# Patient Record
Sex: FEMALE | Race: ASIAN | NOT HISPANIC OR LATINO | URBAN - METROPOLITAN AREA
[De-identification: names, ages, dates, MRNs, and addresses within clinical notes are randomized per-mention and may not be internally consistent; named-entity substitution may affect disease eponyms.]

---

## 2017-06-07 ENCOUNTER — EMERGENCY (EMERGENCY)
Facility: HOSPITAL | Age: 47
LOS: 0 days | Discharge: ANOTHER HOSPITAL | End: 2017-06-07
Attending: EMERGENCY MEDICINE
Payer: MEDICAID

## 2017-06-07 ENCOUNTER — INPATIENT (INPATIENT)
Facility: HOSPITAL | Age: 47
LOS: 12 days | Discharge: HOME | End: 2017-06-20
Attending: PSYCHIATRY & NEUROLOGY | Admitting: PSYCHIATRY & NEUROLOGY
Payer: COMMERCIAL

## 2017-06-07 VITALS
HEART RATE: 102 BPM | HEIGHT: 63 IN | OXYGEN SATURATION: 99 % | RESPIRATION RATE: 18 BRPM | WEIGHT: 106.04 LBS | SYSTOLIC BLOOD PRESSURE: 145 MMHG | DIASTOLIC BLOOD PRESSURE: 62 MMHG | TEMPERATURE: 99 F

## 2017-06-07 VITALS
RESPIRATION RATE: 20 BRPM | TEMPERATURE: 98 F | OXYGEN SATURATION: 98 % | DIASTOLIC BLOOD PRESSURE: 91 MMHG | HEART RATE: 88 BPM | SYSTOLIC BLOOD PRESSURE: 138 MMHG

## 2017-06-07 VITALS — WEIGHT: 105.16 LBS | TEMPERATURE: 97 F | HEIGHT: 62 IN

## 2017-06-07 DIAGNOSIS — Z91.19 PATIENT'S NONCOMPLIANCE WITH OTHER MEDICAL TREATMENT AND REGIMEN: ICD-10-CM

## 2017-06-07 DIAGNOSIS — R69 ILLNESS, UNSPECIFIED: ICD-10-CM

## 2017-06-07 DIAGNOSIS — F31.9 BIPOLAR DISORDER, UNSPECIFIED: ICD-10-CM

## 2017-06-07 DIAGNOSIS — F25.0 SCHIZOAFFECTIVE DISORDER, BIPOLAR TYPE: ICD-10-CM

## 2017-06-07 LAB
ALBUMIN SERPL ELPH-MCNC: 4.2 G/DL — SIGNIFICANT CHANGE UP (ref 3.3–5)
ALP SERPL-CCNC: 57 U/L — SIGNIFICANT CHANGE UP (ref 40–120)
ALT FLD-CCNC: 25 U/L — SIGNIFICANT CHANGE UP (ref 12–78)
AMPHET UR-MCNC: NEGATIVE — SIGNIFICANT CHANGE UP
ANION GAP SERPL CALC-SCNC: 8 MMOL/L — SIGNIFICANT CHANGE UP (ref 5–17)
APAP SERPL-MCNC: <2 UG/ML — LOW (ref 10–30)
APPEARANCE UR: CLEAR — SIGNIFICANT CHANGE UP
AST SERPL-CCNC: 29 U/L — SIGNIFICANT CHANGE UP (ref 15–37)
BARBITURATES UR SCN-MCNC: NEGATIVE — SIGNIFICANT CHANGE UP
BASOPHILS # BLD AUTO: 0.1 K/UL — SIGNIFICANT CHANGE UP (ref 0–0.2)
BASOPHILS NFR BLD AUTO: 1.6 % — SIGNIFICANT CHANGE UP (ref 0–2)
BENZODIAZ UR-MCNC: NEGATIVE — SIGNIFICANT CHANGE UP
BILIRUB SERPL-MCNC: 0.3 MG/DL — SIGNIFICANT CHANGE UP (ref 0.2–1.2)
BILIRUB UR-MCNC: NEGATIVE — SIGNIFICANT CHANGE UP
BUN SERPL-MCNC: 12 MG/DL — SIGNIFICANT CHANGE UP (ref 7–23)
CALCIUM SERPL-MCNC: 9.1 MG/DL — SIGNIFICANT CHANGE UP (ref 8.5–10.1)
CHLORIDE SERPL-SCNC: 103 MMOL/L — SIGNIFICANT CHANGE UP (ref 96–108)
CO2 SERPL-SCNC: 28 MMOL/L — SIGNIFICANT CHANGE UP (ref 22–31)
COCAINE METAB.OTHER UR-MCNC: NEGATIVE — SIGNIFICANT CHANGE UP
COLOR SPEC: YELLOW — SIGNIFICANT CHANGE UP
CREAT SERPL-MCNC: 0.86 MG/DL — SIGNIFICANT CHANGE UP (ref 0.5–1.3)
DIFF PNL FLD: NEGATIVE — SIGNIFICANT CHANGE UP
EOSINOPHIL # BLD AUTO: 0.1 K/UL — SIGNIFICANT CHANGE UP (ref 0–0.5)
EOSINOPHIL NFR BLD AUTO: 1.4 % — SIGNIFICANT CHANGE UP (ref 0–6)
ETHANOL SERPL-MCNC: <10 MG/DL — SIGNIFICANT CHANGE UP (ref 0–10)
GLUCOSE SERPL-MCNC: 115 MG/DL — HIGH (ref 70–99)
GLUCOSE UR QL: NEGATIVE MG/DL — SIGNIFICANT CHANGE UP
HCG UR QL: NEGATIVE — SIGNIFICANT CHANGE UP
HCT VFR BLD CALC: 34.5 % — SIGNIFICANT CHANGE UP (ref 34.5–45)
HGB BLD-MCNC: 12.4 G/DL — SIGNIFICANT CHANGE UP (ref 11.5–15.5)
KETONES UR-MCNC: ABNORMAL
LEUKOCYTE ESTERASE UR-ACNC: NEGATIVE — SIGNIFICANT CHANGE UP
LYMPHOCYTES # BLD AUTO: 2.8 K/UL — SIGNIFICANT CHANGE UP (ref 1–3.3)
LYMPHOCYTES # BLD AUTO: 30.6 % — SIGNIFICANT CHANGE UP (ref 13–44)
MCHC RBC-ENTMCNC: 31.7 PG — SIGNIFICANT CHANGE UP (ref 27–34)
MCHC RBC-ENTMCNC: 36 GM/DL — SIGNIFICANT CHANGE UP (ref 32–36)
MCV RBC AUTO: 88.1 FL — SIGNIFICANT CHANGE UP (ref 80–100)
METHADONE UR-MCNC: NEGATIVE — SIGNIFICANT CHANGE UP
MONOCYTES # BLD AUTO: 0.7 K/UL — SIGNIFICANT CHANGE UP (ref 0–0.9)
MONOCYTES NFR BLD AUTO: 7.9 % — SIGNIFICANT CHANGE UP (ref 2–14)
NEUTROPHILS # BLD AUTO: 5.3 K/UL — SIGNIFICANT CHANGE UP (ref 1.8–7.4)
NEUTROPHILS NFR BLD AUTO: 58.5 % — SIGNIFICANT CHANGE UP (ref 43–77)
NITRITE UR-MCNC: NEGATIVE — SIGNIFICANT CHANGE UP
OPIATES UR-MCNC: NEGATIVE — SIGNIFICANT CHANGE UP
PCP SPEC-MCNC: SIGNIFICANT CHANGE UP
PCP UR-MCNC: NEGATIVE — SIGNIFICANT CHANGE UP
PH UR: 6 — SIGNIFICANT CHANGE UP (ref 5–8)
PLATELET # BLD AUTO: 321 K/UL — SIGNIFICANT CHANGE UP (ref 150–400)
POTASSIUM SERPL-MCNC: 3.6 MMOL/L — SIGNIFICANT CHANGE UP (ref 3.5–5.3)
POTASSIUM SERPL-SCNC: 3.6 MMOL/L — SIGNIFICANT CHANGE UP (ref 3.5–5.3)
PROT SERPL-MCNC: 8.2 GM/DL — SIGNIFICANT CHANGE UP (ref 6–8.3)
PROT UR-MCNC: NEGATIVE MG/DL — SIGNIFICANT CHANGE UP
RBC # BLD: 3.92 M/UL — SIGNIFICANT CHANGE UP (ref 3.8–5.2)
RBC # FLD: 12.1 % — SIGNIFICANT CHANGE UP (ref 11–15)
SALICYLATES SERPL-MCNC: 4.6 MG/DL — SIGNIFICANT CHANGE UP (ref 2.8–20)
SODIUM SERPL-SCNC: 139 MMOL/L — SIGNIFICANT CHANGE UP (ref 135–145)
SP GR SPEC: 1.01 — SIGNIFICANT CHANGE UP (ref 1.01–1.02)
THC UR QL: POSITIVE — SIGNIFICANT CHANGE UP
TSH SERPL-MCNC: 1.05 UU/ML — SIGNIFICANT CHANGE UP (ref 0.36–3.74)
UROBILINOGEN FLD QL: NEGATIVE MG/DL — SIGNIFICANT CHANGE UP
VALPROATE SERPL-MCNC: <3 UG/ML — LOW (ref 50–100)
WBC # BLD: 9.1 K/UL — SIGNIFICANT CHANGE UP (ref 3.8–10.5)
WBC # FLD AUTO: 9.1 K/UL — SIGNIFICANT CHANGE UP (ref 3.8–10.5)

## 2017-06-07 PROCEDURE — 99223 1ST HOSP IP/OBS HIGH 75: CPT

## 2017-06-07 PROCEDURE — 90792 PSYCH DIAG EVAL W/MED SRVCS: CPT

## 2017-06-07 PROCEDURE — 99284 EMERGENCY DEPT VISIT MOD MDM: CPT

## 2017-06-07 RX ORDER — OLANZAPINE 15 MG/1
10 TABLET, FILM COATED ORAL AT BEDTIME
Qty: 0 | Refills: 0 | Status: DISCONTINUED | OUTPATIENT
Start: 2017-06-07 | End: 2017-06-08

## 2017-06-07 RX ORDER — DIPHENHYDRAMINE HCL 50 MG
50 CAPSULE ORAL ONCE
Qty: 0 | Refills: 0 | Status: DISCONTINUED | OUTPATIENT
Start: 2017-06-07 | End: 2017-06-20

## 2017-06-07 RX ORDER — HALOPERIDOL DECANOATE 100 MG/ML
5 INJECTION INTRAMUSCULAR ONCE
Qty: 0 | Refills: 0 | Status: COMPLETED | OUTPATIENT
Start: 2017-06-07 | End: 2017-06-07

## 2017-06-07 RX ORDER — HALOPERIDOL DECANOATE 100 MG/ML
5 INJECTION INTRAMUSCULAR ONCE
Qty: 0 | Refills: 0 | Status: DISCONTINUED | OUTPATIENT
Start: 2017-06-07 | End: 2017-06-20

## 2017-06-07 RX ORDER — HALOPERIDOL DECANOATE 100 MG/ML
5 INJECTION INTRAMUSCULAR EVERY 6 HOURS
Qty: 0 | Refills: 0 | Status: DISCONTINUED | OUTPATIENT
Start: 2017-06-07 | End: 2017-06-20

## 2017-06-07 RX ORDER — DIPHENHYDRAMINE HCL 50 MG
50 CAPSULE ORAL EVERY 6 HOURS
Qty: 0 | Refills: 0 | Status: DISCONTINUED | OUTPATIENT
Start: 2017-06-07 | End: 2017-06-20

## 2017-06-07 RX ADMIN — HALOPERIDOL DECANOATE 5 MILLIGRAM(S): 100 INJECTION INTRAMUSCULAR at 15:15

## 2017-06-07 RX ADMIN — Medication 2 MILLIGRAM(S): at 15:15

## 2017-06-07 NOTE — ED PROVIDER NOTE - OBJECTIVE STATEMENT
Pt. present BIBA for psych evaluation. Pt. has hx of Bipolar and follow up with Dr. Nieto-@ [facility]. EMS states that mom informed them that the patient has not been taking her medications(Zyprexa/Depakote). Pt. also was in her car "talking very fast" today. Pt. in the ED has no complaint. Pt. states that she is compliant with her meds. Pt. denies any SI/HI. Pt. also denies any Auditory/visual hallucinations.

## 2017-06-07 NOTE — ED BEHAVIORAL HEALTH ASSESSMENT NOTE - DETAILS
will signout when bed is available Dr Parra Low 3. Signout given to Kimberlyn ALVAREZ ([phone])

## 2017-06-07 NOTE — ED ADULT TRIAGE NOTE - CHIEF COMPLAINT QUOTE
biba for psych eval hx bipolar pts mother called 911 after pt found in car behaving erractically mother states non compliant with psych meds noted with skin infection to chin

## 2017-06-07 NOTE — ED BEHAVIORAL HEALTH ASSESSMENT NOTE - OTHER PAST PSYCHIATRIC HISTORY (INCLUDE DETAILS REGARDING ONSET, COURSE OF ILLNESS, INPATIENT/OUTPATIENT TREATMENT)
Patient has an extensive psychiatric history significant for Schizoaffective disorder- bipolar type vs Bipolar 1 disorder- cj, multiple prior inpatient psychiatric admissions, as per psychees last admission in 2015, no documented SI or HI, Cannabis abuse

## 2017-06-07 NOTE — ED BEHAVIORAL HEALTH ASSESSMENT NOTE - HPI (INCLUDE ILLNESS QUALITY, SEVERITY, DURATION, TIMING, CONTEXT, MODIFYING FACTORS, ASSOCIATED SIGNS AND SYMPTOMS)
Briefly, the patient is a 46 year old woman, with unavailable social history, likely lives with mother, has no pertinent medical issues, has an extensive psychiatric history significant for Schizoaffective disorder- bipolar type vs Bipolar 1 disorder- cj, multiple prior inpatient psychiatric admissions, as per psychees last admission in 2015, no documented SI or HI, Cannabis abuse, as per records in treatment with outpatient psychiatrist Dr Nieto, presented via EMS activated by mother for worsening symptoms of cj and aggression. Noncompliant with psychiatric medications.  Met with the patient. Loud, agitated, thought disorganization, pressured speech, verbally abusive, and nearly physically aggressive- threatening towards MD. Received Haldol 5mg + Ativan 2mg IM. Continues to pace, agitated, and unable to interview the patient. Va level <3. No collateral information available from mother.

## 2017-06-07 NOTE — ED ADULT NURSE NOTE - OBJECTIVE STATEMENT
pt pacing in room and very aggressive and loud, pt refusing to speak with ME, Pt request MD Parra to draw labs , pt placed on constant observation for risk of elopement, pt has been non compliant with medications.

## 2017-06-07 NOTE — ED BEHAVIORAL HEALTH ASSESSMENT NOTE - DESCRIPTION
Loud, agitated none documented the patient is a 46 year old woman, with unavailable social history, likely lives with mother, is responsible for a child?

## 2017-06-07 NOTE — ED BEHAVIORAL HEALTH ASSESSMENT NOTE - SUMMARY
Briefly, the patient is a 46 year old woman, with unavailable social history, likely lives with mother, has no pertinent medical issues, has an extensive psychiatric history significant for Schizoaffective disorder- bipolar type vs Bipolar 1 disorder- cj, multiple prior inpatient psychiatric admissions, as per psychees last admission in 2015, no documented SI or HI, Cannabis abuse, as per records in treatment with outpatient psychiatrist Dr Nieto, presented via EMS activated by mother for worsening symptoms of cj and aggression. Noncompliant with psychiatric medications.  Patient is noted to be exhibiting clear manic-psychotic symptoms- affect lability, hostility, pressured speech, inappropriate behaviors, thought disorganization, and paranoia, possible delusions. She is easily agitated, threatening, and nearly aggressive towards MD in ED. Noncompliant with medications.   At this point, based on evaluation, patient meets criteria for an involuntary inpatient psychiatric admission for symptom stabilization, medication management, and ensuring safety.

## 2017-06-08 DIAGNOSIS — F31.12 BIPOLAR DISORDER, CURRENT EPISODE MANIC WITHOUT PSYCHOTIC FEATURES, MODERATE: ICD-10-CM

## 2017-06-08 DIAGNOSIS — R46.1 BIZARRE PERSONAL APPEARANCE: ICD-10-CM

## 2017-06-08 PROCEDURE — 99232 SBSQ HOSP IP/OBS MODERATE 35: CPT

## 2017-06-08 RX ORDER — NICOTINE POLACRILEX 2 MG
1 GUM BUCCAL DAILY
Qty: 0 | Refills: 0 | Status: DISCONTINUED | OUTPATIENT
Start: 2017-06-08 | End: 2017-06-20

## 2017-06-08 RX ORDER — DIVALPROEX SODIUM 500 MG/1
500 TABLET, DELAYED RELEASE ORAL
Qty: 0 | Refills: 0 | Status: DISCONTINUED | OUTPATIENT
Start: 2017-06-08 | End: 2017-06-08

## 2017-06-08 RX ORDER — DIVALPROEX SODIUM 500 MG/1
500 TABLET, DELAYED RELEASE ORAL AT BEDTIME
Qty: 0 | Refills: 0 | Status: DISCONTINUED | OUTPATIENT
Start: 2017-06-08 | End: 2017-06-11

## 2017-06-08 RX ORDER — OLANZAPINE 15 MG/1
10 TABLET, FILM COATED ORAL
Qty: 0 | Refills: 0 | Status: DISCONTINUED | OUTPATIENT
Start: 2017-06-08 | End: 2017-06-20

## 2017-06-08 RX ADMIN — HALOPERIDOL DECANOATE 5 MILLIGRAM(S): 100 INJECTION INTRAMUSCULAR at 22:11

## 2017-06-08 RX ADMIN — Medication 50 MILLIGRAM(S): at 22:11

## 2017-06-08 RX ADMIN — Medication 2 MILLIGRAM(S): at 11:00

## 2017-06-08 RX ADMIN — Medication 1 PATCH: at 13:17

## 2017-06-08 RX ADMIN — OLANZAPINE 10 MILLIGRAM(S): 15 TABLET, FILM COATED ORAL at 11:03

## 2017-06-08 RX ADMIN — DIVALPROEX SODIUM 500 MILLIGRAM(S): 500 TABLET, DELAYED RELEASE ORAL at 22:11

## 2017-06-09 LAB
ALBUMIN SERPL ELPH-MCNC: 4.1 G/DL — SIGNIFICANT CHANGE UP (ref 3.3–5)
ALP SERPL-CCNC: 42 U/L — SIGNIFICANT CHANGE UP (ref 40–120)
ALT FLD-CCNC: 14 U/L — SIGNIFICANT CHANGE UP (ref 4–33)
AST SERPL-CCNC: 26 U/L — SIGNIFICANT CHANGE UP (ref 4–32)
BASOPHILS # BLD AUTO: 0.02 K/UL — SIGNIFICANT CHANGE UP (ref 0–0.2)
BASOPHILS NFR BLD AUTO: 0.3 % — SIGNIFICANT CHANGE UP (ref 0–2)
BILIRUB SERPL-MCNC: 0.2 MG/DL — SIGNIFICANT CHANGE UP (ref 0.2–1.2)
BUN SERPL-MCNC: 7 MG/DL — SIGNIFICANT CHANGE UP (ref 7–23)
CALCIUM SERPL-MCNC: 8.9 MG/DL — SIGNIFICANT CHANGE UP (ref 8.4–10.5)
CHLORIDE SERPL-SCNC: 100 MMOL/L — SIGNIFICANT CHANGE UP (ref 98–107)
CO2 SERPL-SCNC: 26 MMOL/L — SIGNIFICANT CHANGE UP (ref 22–31)
CREAT SERPL-MCNC: 0.75 MG/DL — SIGNIFICANT CHANGE UP (ref 0.5–1.3)
EOSINOPHIL # BLD AUTO: 0.07 K/UL — SIGNIFICANT CHANGE UP (ref 0–0.5)
EOSINOPHIL NFR BLD AUTO: 1.1 % — SIGNIFICANT CHANGE UP (ref 0–6)
GLUCOSE SERPL-MCNC: 82 MG/DL — SIGNIFICANT CHANGE UP (ref 70–99)
HCG SERPL-ACNC: < 5 MIU/ML — SIGNIFICANT CHANGE UP
HCT VFR BLD CALC: 37.2 % — SIGNIFICANT CHANGE UP (ref 34.5–45)
HGB BLD-MCNC: 11.9 G/DL — SIGNIFICANT CHANGE UP (ref 11.5–15.5)
IMM GRANULOCYTES NFR BLD AUTO: 0.2 % — SIGNIFICANT CHANGE UP (ref 0–1.5)
LYMPHOCYTES # BLD AUTO: 2.15 K/UL — SIGNIFICANT CHANGE UP (ref 1–3.3)
LYMPHOCYTES # BLD AUTO: 32.7 % — SIGNIFICANT CHANGE UP (ref 13–44)
MCHC RBC-ENTMCNC: 30.5 PG — SIGNIFICANT CHANGE UP (ref 27–34)
MCHC RBC-ENTMCNC: 32 % — SIGNIFICANT CHANGE UP (ref 32–36)
MCV RBC AUTO: 95.4 FL — SIGNIFICANT CHANGE UP (ref 80–100)
MONOCYTES # BLD AUTO: 0.3 K/UL — SIGNIFICANT CHANGE UP (ref 0–0.9)
MONOCYTES NFR BLD AUTO: 4.6 % — SIGNIFICANT CHANGE UP (ref 2–14)
NEUTROPHILS # BLD AUTO: 4.02 K/UL — SIGNIFICANT CHANGE UP (ref 1.8–7.4)
NEUTROPHILS NFR BLD AUTO: 61.1 % — SIGNIFICANT CHANGE UP (ref 43–77)
PLATELET # BLD AUTO: 317 K/UL — SIGNIFICANT CHANGE UP (ref 150–400)
PMV BLD: 11 FL — SIGNIFICANT CHANGE UP (ref 7–13)
POTASSIUM SERPL-MCNC: 3.9 MMOL/L — SIGNIFICANT CHANGE UP (ref 3.5–5.3)
POTASSIUM SERPL-SCNC: 3.9 MMOL/L — SIGNIFICANT CHANGE UP (ref 3.5–5.3)
PROT SERPL-MCNC: 6.7 G/DL — SIGNIFICANT CHANGE UP (ref 6–8.3)
RBC # BLD: 3.9 M/UL — SIGNIFICANT CHANGE UP (ref 3.8–5.2)
RBC # FLD: 13.8 % — SIGNIFICANT CHANGE UP (ref 10.3–14.5)
SODIUM SERPL-SCNC: 139 MMOL/L — SIGNIFICANT CHANGE UP (ref 135–145)
TSH SERPL-MCNC: 0.96 UIU/ML — SIGNIFICANT CHANGE UP (ref 0.27–4.2)
WBC # BLD: 6.57 K/UL — SIGNIFICANT CHANGE UP (ref 3.8–10.5)
WBC # FLD AUTO: 6.57 K/UL — SIGNIFICANT CHANGE UP (ref 3.8–10.5)

## 2017-06-09 PROCEDURE — 99232 SBSQ HOSP IP/OBS MODERATE 35: CPT

## 2017-06-09 RX ORDER — NICOTINE POLACRILEX 2 MG
4 GUM BUCCAL
Qty: 0 | Refills: 0 | Status: DISCONTINUED | OUTPATIENT
Start: 2017-06-09 | End: 2017-06-20

## 2017-06-09 RX ADMIN — OLANZAPINE 10 MILLIGRAM(S): 15 TABLET, FILM COATED ORAL at 21:11

## 2017-06-09 RX ADMIN — Medication 1 PATCH: at 12:56

## 2017-06-09 RX ADMIN — Medication 4 MILLIGRAM(S): at 02:40

## 2017-06-09 RX ADMIN — OLANZAPINE 10 MILLIGRAM(S): 15 TABLET, FILM COATED ORAL at 09:12

## 2017-06-09 RX ADMIN — Medication 2 MILLIGRAM(S): at 02:30

## 2017-06-09 RX ADMIN — DIVALPROEX SODIUM 500 MILLIGRAM(S): 500 TABLET, DELAYED RELEASE ORAL at 21:11

## 2017-06-09 RX ADMIN — Medication 4 MILLIGRAM(S): at 09:12

## 2017-06-09 RX ADMIN — Medication 4 MILLIGRAM(S): at 17:53

## 2017-06-09 RX ADMIN — Medication 4 MILLIGRAM(S): at 04:40

## 2017-06-10 PROCEDURE — 99232 SBSQ HOSP IP/OBS MODERATE 35: CPT

## 2017-06-10 RX ORDER — ACETAMINOPHEN 500 MG
650 TABLET ORAL EVERY 6 HOURS
Qty: 0 | Refills: 0 | Status: DISCONTINUED | OUTPATIENT
Start: 2017-06-10 | End: 2017-06-20

## 2017-06-10 RX ADMIN — Medication 4 MILLIGRAM(S): at 08:41

## 2017-06-10 RX ADMIN — Medication 650 MILLIGRAM(S): at 21:23

## 2017-06-10 RX ADMIN — Medication 1 PATCH: at 08:40

## 2017-06-10 RX ADMIN — Medication 4 MILLIGRAM(S): at 19:03

## 2017-06-10 RX ADMIN — Medication 4 MILLIGRAM(S): at 17:57

## 2017-06-10 RX ADMIN — Medication 4 MILLIGRAM(S): at 11:51

## 2017-06-10 RX ADMIN — Medication 4 MILLIGRAM(S): at 14:50

## 2017-06-10 RX ADMIN — Medication 650 MILLIGRAM(S): at 22:52

## 2017-06-10 RX ADMIN — DIVALPROEX SODIUM 500 MILLIGRAM(S): 500 TABLET, DELAYED RELEASE ORAL at 20:33

## 2017-06-10 RX ADMIN — OLANZAPINE 10 MILLIGRAM(S): 15 TABLET, FILM COATED ORAL at 20:33

## 2017-06-10 RX ADMIN — OLANZAPINE 10 MILLIGRAM(S): 15 TABLET, FILM COATED ORAL at 08:40

## 2017-06-11 PROCEDURE — 99232 SBSQ HOSP IP/OBS MODERATE 35: CPT

## 2017-06-11 RX ORDER — DIVALPROEX SODIUM 500 MG/1
1000 TABLET, DELAYED RELEASE ORAL AT BEDTIME
Qty: 0 | Refills: 0 | Status: DISCONTINUED | OUTPATIENT
Start: 2017-06-11 | End: 2017-06-14

## 2017-06-11 RX ADMIN — OLANZAPINE 10 MILLIGRAM(S): 15 TABLET, FILM COATED ORAL at 20:08

## 2017-06-11 RX ADMIN — Medication 4 MILLIGRAM(S): at 01:55

## 2017-06-11 RX ADMIN — Medication 650 MILLIGRAM(S): at 10:12

## 2017-06-11 RX ADMIN — DIVALPROEX SODIUM 1000 MILLIGRAM(S): 500 TABLET, DELAYED RELEASE ORAL at 20:08

## 2017-06-11 RX ADMIN — Medication 4 MILLIGRAM(S): at 13:03

## 2017-06-11 RX ADMIN — OLANZAPINE 10 MILLIGRAM(S): 15 TABLET, FILM COATED ORAL at 08:35

## 2017-06-11 RX ADMIN — Medication 4 MILLIGRAM(S): at 08:35

## 2017-06-11 RX ADMIN — Medication 4 MILLIGRAM(S): at 10:35

## 2017-06-11 RX ADMIN — Medication 650 MILLIGRAM(S): at 08:57

## 2017-06-12 PROCEDURE — 99232 SBSQ HOSP IP/OBS MODERATE 35: CPT

## 2017-06-12 RX ADMIN — Medication 2 MILLIGRAM(S): at 13:15

## 2017-06-12 RX ADMIN — OLANZAPINE 10 MILLIGRAM(S): 15 TABLET, FILM COATED ORAL at 08:44

## 2017-06-12 RX ADMIN — Medication 1 PATCH: at 08:44

## 2017-06-12 RX ADMIN — Medication 4 MILLIGRAM(S): at 13:15

## 2017-06-13 PROCEDURE — 99232 SBSQ HOSP IP/OBS MODERATE 35: CPT

## 2017-06-13 RX ORDER — ACETAMINOPHEN 500 MG
650 TABLET ORAL EVERY 6 HOURS
Qty: 0 | Refills: 0 | Status: DISCONTINUED | OUTPATIENT
Start: 2017-06-13 | End: 2017-06-20

## 2017-06-13 RX ORDER — ACETAMINOPHEN 500 MG
650 TABLET ORAL EVERY 6 HOURS
Qty: 0 | Refills: 0 | Status: DISCONTINUED | OUTPATIENT
Start: 2017-06-13 | End: 2017-06-13

## 2017-06-13 RX ADMIN — Medication 4 MILLIGRAM(S): at 04:44

## 2017-06-13 RX ADMIN — OLANZAPINE 10 MILLIGRAM(S): 15 TABLET, FILM COATED ORAL at 08:12

## 2017-06-13 RX ADMIN — HALOPERIDOL DECANOATE 5 MILLIGRAM(S): 100 INJECTION INTRAMUSCULAR at 09:37

## 2017-06-13 RX ADMIN — DIVALPROEX SODIUM 1000 MILLIGRAM(S): 500 TABLET, DELAYED RELEASE ORAL at 20:58

## 2017-06-13 RX ADMIN — Medication 4 MILLIGRAM(S): at 11:30

## 2017-06-13 RX ADMIN — Medication 2 MILLIGRAM(S): at 09:37

## 2017-06-13 RX ADMIN — Medication 1 PATCH: at 08:12

## 2017-06-13 RX ADMIN — OLANZAPINE 10 MILLIGRAM(S): 15 TABLET, FILM COATED ORAL at 20:58

## 2017-06-13 RX ADMIN — Medication 50 MILLIGRAM(S): at 09:36

## 2017-06-13 RX ADMIN — Medication 650 MILLIGRAM(S): at 12:27

## 2017-06-13 RX ADMIN — Medication 4 MILLIGRAM(S): at 17:33

## 2017-06-13 RX ADMIN — Medication 650 MILLIGRAM(S): at 13:27

## 2017-06-14 LAB — VALPROATE SERPL-MCNC: 64.8 UG/ML — SIGNIFICANT CHANGE UP (ref 50–100)

## 2017-06-14 PROCEDURE — 99232 SBSQ HOSP IP/OBS MODERATE 35: CPT

## 2017-06-14 RX ORDER — DIVALPROEX SODIUM 500 MG/1
1250 TABLET, DELAYED RELEASE ORAL AT BEDTIME
Qty: 0 | Refills: 0 | Status: DISCONTINUED | OUTPATIENT
Start: 2017-06-14 | End: 2017-06-20

## 2017-06-14 RX ADMIN — Medication 4 MILLIGRAM(S): at 16:41

## 2017-06-14 RX ADMIN — Medication 4 MILLIGRAM(S): at 07:53

## 2017-06-14 RX ADMIN — Medication 1 PATCH: at 08:39

## 2017-06-14 RX ADMIN — DIVALPROEX SODIUM 1250 MILLIGRAM(S): 500 TABLET, DELAYED RELEASE ORAL at 20:01

## 2017-06-14 RX ADMIN — Medication 4 MILLIGRAM(S): at 12:10

## 2017-06-14 RX ADMIN — OLANZAPINE 10 MILLIGRAM(S): 15 TABLET, FILM COATED ORAL at 20:02

## 2017-06-14 RX ADMIN — OLANZAPINE 10 MILLIGRAM(S): 15 TABLET, FILM COATED ORAL at 08:39

## 2017-06-14 RX ADMIN — Medication 4 MILLIGRAM(S): at 20:28

## 2017-06-14 RX ADMIN — Medication 4 MILLIGRAM(S): at 14:32

## 2017-06-15 PROCEDURE — 99232 SBSQ HOSP IP/OBS MODERATE 35: CPT

## 2017-06-15 RX ADMIN — Medication 650 MILLIGRAM(S): at 03:42

## 2017-06-15 RX ADMIN — OLANZAPINE 10 MILLIGRAM(S): 15 TABLET, FILM COATED ORAL at 20:28

## 2017-06-15 RX ADMIN — OLANZAPINE 10 MILLIGRAM(S): 15 TABLET, FILM COATED ORAL at 08:44

## 2017-06-15 RX ADMIN — DIVALPROEX SODIUM 1250 MILLIGRAM(S): 500 TABLET, DELAYED RELEASE ORAL at 20:28

## 2017-06-15 RX ADMIN — Medication 1 PATCH: at 08:44

## 2017-06-15 RX ADMIN — Medication 650 MILLIGRAM(S): at 04:30

## 2017-06-15 RX ADMIN — Medication 4 MILLIGRAM(S): at 14:40

## 2017-06-15 RX ADMIN — Medication 4 MILLIGRAM(S): at 09:54

## 2017-06-15 RX ADMIN — Medication 4 MILLIGRAM(S): at 05:19

## 2017-06-15 RX ADMIN — Medication 4 MILLIGRAM(S): at 20:28

## 2017-06-15 RX ADMIN — Medication 650 MILLIGRAM(S): at 10:35

## 2017-06-16 PROCEDURE — 99232 SBSQ HOSP IP/OBS MODERATE 35: CPT

## 2017-06-16 RX ADMIN — OLANZAPINE 10 MILLIGRAM(S): 15 TABLET, FILM COATED ORAL at 20:10

## 2017-06-16 RX ADMIN — Medication 4 MILLIGRAM(S): at 17:16

## 2017-06-16 RX ADMIN — DIVALPROEX SODIUM 1250 MILLIGRAM(S): 500 TABLET, DELAYED RELEASE ORAL at 20:10

## 2017-06-16 RX ADMIN — OLANZAPINE 10 MILLIGRAM(S): 15 TABLET, FILM COATED ORAL at 08:28

## 2017-06-16 RX ADMIN — Medication 4 MILLIGRAM(S): at 20:45

## 2017-06-16 RX ADMIN — Medication 4 MILLIGRAM(S): at 12:00

## 2017-06-16 RX ADMIN — Medication 4 MILLIGRAM(S): at 08:28

## 2017-06-16 RX ADMIN — Medication 1 PATCH: at 08:28

## 2017-06-17 RX ADMIN — Medication 4 MILLIGRAM(S): at 14:00

## 2017-06-17 RX ADMIN — Medication 1 PATCH: at 09:00

## 2017-06-17 RX ADMIN — Medication 4 MILLIGRAM(S): at 16:00

## 2017-06-17 RX ADMIN — Medication 4 MILLIGRAM(S): at 07:38

## 2017-06-17 RX ADMIN — OLANZAPINE 10 MILLIGRAM(S): 15 TABLET, FILM COATED ORAL at 09:00

## 2017-06-17 RX ADMIN — Medication 4 MILLIGRAM(S): at 12:00

## 2017-06-17 RX ADMIN — Medication 4 MILLIGRAM(S): at 10:00

## 2017-06-17 RX ADMIN — Medication 4 MILLIGRAM(S): at 18:00

## 2017-06-17 RX ADMIN — DIVALPROEX SODIUM 1250 MILLIGRAM(S): 500 TABLET, DELAYED RELEASE ORAL at 21:00

## 2017-06-17 RX ADMIN — OLANZAPINE 10 MILLIGRAM(S): 15 TABLET, FILM COATED ORAL at 21:00

## 2017-06-18 RX ADMIN — Medication 650 MILLIGRAM(S): at 21:50

## 2017-06-18 RX ADMIN — Medication 4 MILLIGRAM(S): at 07:19

## 2017-06-18 RX ADMIN — OLANZAPINE 10 MILLIGRAM(S): 15 TABLET, FILM COATED ORAL at 07:19

## 2017-06-18 RX ADMIN — Medication 1 PATCH: at 07:18

## 2017-06-18 RX ADMIN — Medication 1 PATCH: at 07:19

## 2017-06-18 RX ADMIN — DIVALPROEX SODIUM 1250 MILLIGRAM(S): 500 TABLET, DELAYED RELEASE ORAL at 20:40

## 2017-06-18 RX ADMIN — Medication 4 MILLIGRAM(S): at 16:35

## 2017-06-18 RX ADMIN — OLANZAPINE 10 MILLIGRAM(S): 15 TABLET, FILM COATED ORAL at 20:40

## 2017-06-18 RX ADMIN — Medication 4 MILLIGRAM(S): at 13:23

## 2017-06-18 RX ADMIN — Medication 4 MILLIGRAM(S): at 20:39

## 2017-06-18 RX ADMIN — Medication 4 MILLIGRAM(S): at 11:32

## 2017-06-19 LAB — VALPROATE SERPL-MCNC: 66.2 UG/ML — SIGNIFICANT CHANGE UP (ref 50–100)

## 2017-06-19 PROCEDURE — 99232 SBSQ HOSP IP/OBS MODERATE 35: CPT

## 2017-06-19 RX ORDER — DIVALPROEX SODIUM 500 MG/1
2 TABLET, DELAYED RELEASE ORAL
Qty: 30 | Refills: 0 | OUTPATIENT
Start: 2017-06-19 | End: 2017-07-04

## 2017-06-19 RX ORDER — OLANZAPINE 15 MG/1
1 TABLET, FILM COATED ORAL
Qty: 30 | Refills: 0 | OUTPATIENT
Start: 2017-06-19 | End: 2017-07-04

## 2017-06-19 RX ADMIN — Medication 4 MILLIGRAM(S): at 15:15

## 2017-06-19 RX ADMIN — Medication 4 MILLIGRAM(S): at 09:47

## 2017-06-19 RX ADMIN — Medication 4 MILLIGRAM(S): at 07:31

## 2017-06-19 RX ADMIN — OLANZAPINE 10 MILLIGRAM(S): 15 TABLET, FILM COATED ORAL at 08:54

## 2017-06-19 RX ADMIN — Medication 4 MILLIGRAM(S): at 17:28

## 2017-06-19 RX ADMIN — Medication 650 MILLIGRAM(S): at 17:16

## 2017-06-19 RX ADMIN — Medication 1 PATCH: at 08:54

## 2017-06-19 RX ADMIN — Medication 4 MILLIGRAM(S): at 11:40

## 2017-06-19 RX ADMIN — OLANZAPINE 10 MILLIGRAM(S): 15 TABLET, FILM COATED ORAL at 20:37

## 2017-06-19 RX ADMIN — DIVALPROEX SODIUM 1250 MILLIGRAM(S): 500 TABLET, DELAYED RELEASE ORAL at 20:37

## 2017-06-19 RX ADMIN — Medication 1 PATCH: at 11:39

## 2017-06-19 RX ADMIN — Medication 650 MILLIGRAM(S): at 15:55

## 2017-06-20 VITALS — TEMPERATURE: 97 F

## 2017-06-20 RX ORDER — OLANZAPINE 15 MG/1
0 TABLET, FILM COATED ORAL
Qty: 0 | Refills: 0 | COMMUNITY

## 2017-06-20 RX ORDER — DIVALPROEX SODIUM 500 MG/1
0 TABLET, DELAYED RELEASE ORAL
Qty: 0 | Refills: 0 | COMMUNITY

## 2017-06-20 RX ORDER — DIVALPROEX SODIUM 500 MG/1
5 TABLET, DELAYED RELEASE ORAL
Qty: 0 | Refills: 0 | COMMUNITY
Start: 2017-06-20

## 2017-06-20 RX ORDER — OLANZAPINE 15 MG/1
1 TABLET, FILM COATED ORAL
Qty: 0 | Refills: 0 | COMMUNITY
Start: 2017-06-20

## 2017-06-20 RX ADMIN — Medication 50 MILLIGRAM(S): at 09:25

## 2017-06-20 RX ADMIN — Medication 4 MILLIGRAM(S): at 07:59

## 2017-06-20 RX ADMIN — Medication 1 PATCH: at 08:48

## 2017-06-20 RX ADMIN — Medication 4 MILLIGRAM(S): at 12:52

## 2017-06-20 RX ADMIN — OLANZAPINE 10 MILLIGRAM(S): 15 TABLET, FILM COATED ORAL at 08:48

## 2017-07-12 ENCOUNTER — OUTPATIENT (OUTPATIENT)
Dept: OUTPATIENT SERVICES | Facility: HOSPITAL | Age: 47
LOS: 1 days | Discharge: HOME | End: 2017-07-12

## 2017-07-12 ENCOUNTER — INPATIENT (INPATIENT)
Facility: HOSPITAL | Age: 47
LOS: 4 days | Discharge: HOME | End: 2017-07-17
Attending: PSYCHIATRY & NEUROLOGY | Admitting: PSYCHIATRY & NEUROLOGY
Payer: MEDICAID

## 2017-07-12 VITALS
HEART RATE: 82 BPM | TEMPERATURE: 98 F | SYSTOLIC BLOOD PRESSURE: 117 MMHG | RESPIRATION RATE: 18 BRPM | OXYGEN SATURATION: 99 % | DIASTOLIC BLOOD PRESSURE: 80 MMHG

## 2017-07-12 DIAGNOSIS — F31.9 BIPOLAR DISORDER, UNSPECIFIED: ICD-10-CM

## 2017-07-12 LAB
ALBUMIN SERPL ELPH-MCNC: 4.6 G/DL — SIGNIFICANT CHANGE UP (ref 3.3–5)
ALP SERPL-CCNC: 47 U/L — SIGNIFICANT CHANGE UP (ref 40–120)
ALT FLD-CCNC: 10 U/L — SIGNIFICANT CHANGE UP (ref 4–33)
AMPHET UR-MCNC: NEGATIVE — SIGNIFICANT CHANGE UP
APAP SERPL-MCNC: < 15 UG/ML — LOW (ref 15–25)
APPEARANCE UR: CLEAR — SIGNIFICANT CHANGE UP
AST SERPL-CCNC: 18 U/L — SIGNIFICANT CHANGE UP (ref 4–32)
BACTERIA # UR AUTO: SIGNIFICANT CHANGE UP
BARBITURATES MEASUREMENT: NEGATIVE — SIGNIFICANT CHANGE UP
BARBITURATES UR SCN-MCNC: NEGATIVE — SIGNIFICANT CHANGE UP
BASOPHILS # BLD AUTO: 0.05 K/UL — SIGNIFICANT CHANGE UP (ref 0–0.2)
BASOPHILS NFR BLD AUTO: 0.8 % — SIGNIFICANT CHANGE UP (ref 0–2)
BENZODIAZ SERPL-MCNC: NEGATIVE — SIGNIFICANT CHANGE UP
BENZODIAZ UR-MCNC: NEGATIVE — SIGNIFICANT CHANGE UP
BILIRUB SERPL-MCNC: 0.2 MG/DL — SIGNIFICANT CHANGE UP (ref 0.2–1.2)
BILIRUB UR-MCNC: NEGATIVE — SIGNIFICANT CHANGE UP
BLOOD UR QL VISUAL: NEGATIVE — SIGNIFICANT CHANGE UP
BUN SERPL-MCNC: 14 MG/DL — SIGNIFICANT CHANGE UP (ref 7–23)
CALCIUM SERPL-MCNC: 9.5 MG/DL — SIGNIFICANT CHANGE UP (ref 8.4–10.5)
CANNABINOIDS UR-MCNC: POSITIVE — SIGNIFICANT CHANGE UP
CHLORIDE SERPL-SCNC: 99 MMOL/L — SIGNIFICANT CHANGE UP (ref 98–107)
CO2 SERPL-SCNC: 24 MMOL/L — SIGNIFICANT CHANGE UP (ref 22–31)
COCAINE METAB.OTHER UR-MCNC: NEGATIVE — SIGNIFICANT CHANGE UP
COLOR SPEC: SIGNIFICANT CHANGE UP
CREAT SERPL-MCNC: 0.82 MG/DL — SIGNIFICANT CHANGE UP (ref 0.5–1.3)
EOSINOPHIL # BLD AUTO: 0.16 K/UL — SIGNIFICANT CHANGE UP (ref 0–0.5)
EOSINOPHIL NFR BLD AUTO: 2.7 % — SIGNIFICANT CHANGE UP (ref 0–6)
ETHANOL BLD-MCNC: < 10 MG/DL — SIGNIFICANT CHANGE UP
GLUCOSE SERPL-MCNC: 109 MG/DL — HIGH (ref 70–99)
GLUCOSE UR-MCNC: NEGATIVE — SIGNIFICANT CHANGE UP
HCG SERPL-ACNC: < 5 MIU/ML — SIGNIFICANT CHANGE UP
HCT VFR BLD CALC: 35.1 % — SIGNIFICANT CHANGE UP (ref 34.5–45)
HGB BLD-MCNC: 11.6 G/DL — SIGNIFICANT CHANGE UP (ref 11.5–15.5)
IMM GRANULOCYTES # BLD AUTO: 0.02 # — SIGNIFICANT CHANGE UP
IMM GRANULOCYTES NFR BLD AUTO: 0.3 % — SIGNIFICANT CHANGE UP (ref 0–1.5)
KETONES UR-MCNC: SIGNIFICANT CHANGE UP
LEUKOCYTE ESTERASE UR-ACNC: NEGATIVE — SIGNIFICANT CHANGE UP
LYMPHOCYTES # BLD AUTO: 2.03 K/UL — SIGNIFICANT CHANGE UP (ref 1–3.3)
LYMPHOCYTES # BLD AUTO: 33.9 % — SIGNIFICANT CHANGE UP (ref 13–44)
MCHC RBC-ENTMCNC: 30.2 PG — SIGNIFICANT CHANGE UP (ref 27–34)
MCHC RBC-ENTMCNC: 33 % — SIGNIFICANT CHANGE UP (ref 32–36)
MCV RBC AUTO: 91.4 FL — SIGNIFICANT CHANGE UP (ref 80–100)
METHADONE UR-MCNC: NEGATIVE — SIGNIFICANT CHANGE UP
MONOCYTES # BLD AUTO: 0.76 K/UL — SIGNIFICANT CHANGE UP (ref 0–0.9)
MONOCYTES NFR BLD AUTO: 12.7 % — SIGNIFICANT CHANGE UP (ref 2–14)
MUCOUS THREADS # UR AUTO: SIGNIFICANT CHANGE UP
NEUTROPHILS # BLD AUTO: 2.97 K/UL — SIGNIFICANT CHANGE UP (ref 1.8–7.4)
NEUTROPHILS NFR BLD AUTO: 49.6 % — SIGNIFICANT CHANGE UP (ref 43–77)
NITRITE UR-MCNC: NEGATIVE — SIGNIFICANT CHANGE UP
NRBC # FLD: 0 — SIGNIFICANT CHANGE UP
OPIATES UR-MCNC: NEGATIVE — SIGNIFICANT CHANGE UP
OXYCODONE UR-MCNC: NEGATIVE — SIGNIFICANT CHANGE UP
PCP UR-MCNC: NEGATIVE — SIGNIFICANT CHANGE UP
PH UR: 6.5 — SIGNIFICANT CHANGE UP (ref 4.6–8)
PLATELET # BLD AUTO: 273 K/UL — SIGNIFICANT CHANGE UP (ref 150–400)
PMV BLD: 10.4 FL — SIGNIFICANT CHANGE UP (ref 7–13)
POTASSIUM SERPL-MCNC: 4.3 MMOL/L — SIGNIFICANT CHANGE UP (ref 3.5–5.3)
POTASSIUM SERPL-SCNC: 4.3 MMOL/L — SIGNIFICANT CHANGE UP (ref 3.5–5.3)
PROT SERPL-MCNC: 7.4 G/DL — SIGNIFICANT CHANGE UP (ref 6–8.3)
PROT UR-MCNC: 20 — SIGNIFICANT CHANGE UP
RBC # BLD: 3.84 M/UL — SIGNIFICANT CHANGE UP (ref 3.8–5.2)
RBC # FLD: 13.9 % — SIGNIFICANT CHANGE UP (ref 10.3–14.5)
RBC CASTS # UR COMP ASSIST: SIGNIFICANT CHANGE UP (ref 0–?)
SALICYLATES SERPL-MCNC: < 5 MG/DL — LOW (ref 15–30)
SODIUM SERPL-SCNC: 138 MMOL/L — SIGNIFICANT CHANGE UP (ref 135–145)
SP GR SPEC: 1.01 — SIGNIFICANT CHANGE UP (ref 1–1.03)
SQUAMOUS # UR AUTO: SIGNIFICANT CHANGE UP
TSH SERPL-MCNC: 0.45 UIU/ML — SIGNIFICANT CHANGE UP (ref 0.27–4.2)
UROBILINOGEN FLD QL: NORMAL E.U. — SIGNIFICANT CHANGE UP (ref 0.1–0.2)
WBC # BLD: 5.99 K/UL — SIGNIFICANT CHANGE UP (ref 3.8–10.5)
WBC # FLD AUTO: 5.99 K/UL — SIGNIFICANT CHANGE UP (ref 3.8–10.5)

## 2017-07-12 PROCEDURE — 99285 EMERGENCY DEPT VISIT HI MDM: CPT | Mod: GC

## 2017-07-12 PROCEDURE — 73610 X-RAY EXAM OF ANKLE: CPT | Mod: 26,LT

## 2017-07-12 RX ORDER — DIPHENHYDRAMINE HCL 50 MG
50 CAPSULE ORAL ONCE
Qty: 0 | Refills: 0 | Status: DISCONTINUED | OUTPATIENT
Start: 2017-07-12 | End: 2017-07-17

## 2017-07-12 RX ORDER — DIPHENHYDRAMINE HCL 50 MG
50 CAPSULE ORAL EVERY 6 HOURS
Qty: 0 | Refills: 0 | Status: DISCONTINUED | OUTPATIENT
Start: 2017-07-12 | End: 2017-07-17

## 2017-07-12 RX ORDER — HALOPERIDOL DECANOATE 100 MG/ML
5 INJECTION INTRAMUSCULAR EVERY 6 HOURS
Qty: 0 | Refills: 0 | Status: DISCONTINUED | OUTPATIENT
Start: 2017-07-12 | End: 2017-07-17

## 2017-07-12 RX ORDER — OLANZAPINE 15 MG/1
10 TABLET, FILM COATED ORAL
Qty: 0 | Refills: 0 | Status: DISCONTINUED | OUTPATIENT
Start: 2017-07-12 | End: 2017-07-17

## 2017-07-12 RX ORDER — NICOTINE POLACRILEX 2 MG
2 GUM BUCCAL
Qty: 0 | Refills: 0 | Status: DISCONTINUED | OUTPATIENT
Start: 2017-07-12 | End: 2017-07-17

## 2017-07-12 RX ORDER — HALOPERIDOL DECANOATE 100 MG/ML
5 INJECTION INTRAMUSCULAR ONCE
Qty: 0 | Refills: 0 | Status: DISCONTINUED | OUTPATIENT
Start: 2017-07-12 | End: 2017-07-17

## 2017-07-12 RX ORDER — DIVALPROEX SODIUM 500 MG/1
500 TABLET, DELAYED RELEASE ORAL
Qty: 0 | Refills: 0 | Status: DISCONTINUED | OUTPATIENT
Start: 2017-07-12 | End: 2017-07-14

## 2017-07-12 RX ADMIN — OLANZAPINE 10 MILLIGRAM(S): 15 TABLET, FILM COATED ORAL at 22:33

## 2017-07-12 RX ADMIN — DIVALPROEX SODIUM 500 MILLIGRAM(S): 500 TABLET, DELAYED RELEASE ORAL at 22:33

## 2017-07-12 NOTE — ED ADULT NURSE NOTE - OBJECTIVE STATEMENT
pt is a 46 year old female BIBA for acting act behaviors. pt denies SI/ HI. pt states she ran out of her meds after being discharged, found her neighbor to bring her to the ED today. pt calm and cooperative, speaking fast at times. all personal belongings given to neighbor.

## 2017-07-12 NOTE — ED BEHAVIORAL HEALTH ASSESSMENT NOTE - SUBSTANCE ISSUES AND PLAN (INCLUDE STANDING AND PRN MEDICATION)
Cannabis abuse. Less likely to experience any withdrawal symptoms n/a utox positive for cannabis. no other substance abuse.

## 2017-07-12 NOTE — ED PROVIDER NOTE - OBJECTIVE STATEMENT
This is a 46 year old Female PMHX anemia and Bipolar BIBA with friend for psych eval r/t medication noncompliance. Patient reports she has been out of medication for the past few days. Patient appears manic, hypersexual and grandiose. Denies chest pain, SOB, N/V/D and fevers, Denies palpitations or diaphoresis. Denies Numbness, Tingling, Blurry Vision and HA.  Denies suicidal/homicidal thoughts. Denies visual/auditory hallucinations. Denies ETOH/Illicit drug use. Denies recent falls, trauma and injuries. Denies pain or any other medical complaints.

## 2017-07-12 NOTE — ED PROVIDER NOTE - MEDICAL DECISION MAKING DETAILS
This is a 46 year old Female PMHX anemia and Bipolar BIBA with friend for psych eval r/t medication noncompliance. Patient reports she has been out of medication for the past few days. Patient appears manic, hypersexual and grandiose. Medical evaluation performed. There is no clinical evidence of intoxication or any acute medical problem requiring immediate intervention. Final disposition will be determined by psychiatrist.

## 2017-07-12 NOTE — ED BEHAVIORAL HEALTH ASSESSMENT NOTE - AXIS IV
Problems with access to healthcare services/Economic problems/Other psychosocial and environmental problems
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
## 2017-07-12 NOTE — ED BEHAVIORAL HEALTH ASSESSMENT NOTE - DESCRIPTION
talkative, very friendly, but no inappropriate or agitated anemia the patient is a 46 year old woman who lives alone but possibly at time with mother, admits to being on SSI for bipolar d/o talkative, very friendly, but not inappropriate or agitated

## 2017-07-12 NOTE — ED BEHAVIORAL HEALTH ASSESSMENT NOTE - DETAILS
Low 3. Signout given to Kimberlyn ALVAREZ ([phone]) Dr Parra left ankle pain JIMENA- Dr. Cox Dr. Hearn

## 2017-07-12 NOTE — ED BEHAVIORAL HEALTH ASSESSMENT NOTE - SUMMARY
The patient is a 46 year old woman, domiciled alone versus possibly with mothers at times, psychiatric history significant for Schizoaffective disorder- bipolar type vs Bipolar 1 disorder- cj, multiple prior inpatient psychiatric admissions, recently discharged from [facility] on 6/26 for manic episode, no documented SI or HI, Cannabis abuse, medical hx of anemia per record, presented from outpatient [facility], after missing original appointment post discharge, for evaluation for cj in the face of medication non-compliance.    Patient currently hypomanic, on the verge of becoming fully manic, in the face of not taking her prescribed meds. She is speaking quickly, but is not pressured, can be redirected. She is sexually preoccupied and dressing in vibrant pink colors and makeup. Currently not hallucinating or delusional. Denying drugs/EtOH, no current or past SI/I/P. She is wishing to get medications and follow up outpatient. The patient is a 46 year old woman, domiciled alone versus possibly with mothers at times, psychiatric history significant for Schizoaffective disorder- bipolar type vs Bipolar 1 disorder- cj, multiple prior inpatient psychiatric admissions, recently discharged from [facility] on 6/26 for manic episode, no documented SI or HI, Cannabis abuse, medical hx of anemia per record, presented from outpatient [facility], after missing original appointment post discharge, for evaluation for cj in the face of medication non-compliance.    Patient currently hypomanic, on the verge of becoming fully manic, in the face of not taking her prescribed meds. She is speaking quickly, but is not pressured, can be redirected. She is sexually preoccupied and dressing in vibrant pink colors and makeup. Notably labile. Currently not hallucinating or delusional. Denying drugs/EtOH, no current or past SI/I/P.

## 2017-07-12 NOTE — ED BEHAVIORAL HEALTH NOTE - BEHAVIORAL HEALTH NOTE
Writer received call from Dr. Hearn notifying ED patient is being sent to ED for evaluation. He stated patient presented for appointment today & was disorganized, labile & had missed medication dosages. He denied patient threatening suicide nor being aggressive nor psychotic.   He stated he sent her to ED via ems.

## 2017-07-12 NOTE — ED BEHAVIORAL HEALTH ASSESSMENT NOTE - SUICIDE PROTECTIVE FACTORS
Positive therapeutic relationships/Identifies reasons for living/Fear of death or dying due to pain/suffering/Future oriented

## 2017-07-12 NOTE — ED BEHAVIORAL HEALTH ASSESSMENT NOTE - OTHER PAST PSYCHIATRIC HISTORY (INCLUDE DETAILS REGARDING ONSET, COURSE OF ILLNESS, INPATIENT/OUTPATIENT TREATMENT)
Patient has an extensive psychiatric history significant for Schizoaffective disorder- bipolar type vs Bipolar 1 disorder- cj, multiple prior inpatient psychiatric admissions, recently discharged from [facility] at the end of [place]

## 2017-07-12 NOTE — ED BEHAVIORAL HEALTH ASSESSMENT NOTE - RISK ASSESSMENT
Patient is at an acute risk of endangering herself due to current of hypomania/cj. She has risk factors of recent hospitalization, hx of non-compliance, marijuana use, unstable financial situation.  Protective factors include no suicide attempts, not currently suicidal, not psychotic currently, female gender.

## 2017-07-12 NOTE — ED BEHAVIORAL HEALTH ASSESSMENT NOTE - CASE SUMMARY
46 year old woman, domiciled alone versus possibly with mothers at times, psychiatric history significant for Schizoaffective disorder- bipolar type vs Bipolar 1 disorder- cj, multiple prior inpatient psychiatric admissions, recently discharged from [facility] on 6/26 for manic episode, no documented SI or HI, Cannabis abuse, medical hx of anemia per record, presented from outpatient [facility], after missing original appointment post discharge, for evaluation for cj in the face of medication non-compliance.    Patient appears manic, hyperverbal, dressed in bright pink lipstick and eye shadow, sexually preoccupied but able to be redirected. Admits to medication noncompliance, decreased sleep. Appears to be manic. Outpatient psychiatrist concerned over a missed appointment and medication non compliance since discharge from inpatient. Boyfriend also admits avoiding the patient secondary to being afraid of her erratic behaviors and is also concerned for her safety. Patient requires inpatient admission for safety and stabilization with above plan.

## 2017-07-13 DIAGNOSIS — F31.63 BIPOLAR DISORDER, CURRENT EPISODE MIXED, SEVERE, WITHOUT PSYCHOTIC FEATURES: ICD-10-CM

## 2017-07-13 PROCEDURE — 99223 1ST HOSP IP/OBS HIGH 75: CPT

## 2017-07-13 RX ADMIN — OLANZAPINE 10 MILLIGRAM(S): 15 TABLET, FILM COATED ORAL at 07:55

## 2017-07-13 RX ADMIN — DIVALPROEX SODIUM 500 MILLIGRAM(S): 500 TABLET, DELAYED RELEASE ORAL at 07:55

## 2017-07-13 RX ADMIN — OLANZAPINE 10 MILLIGRAM(S): 15 TABLET, FILM COATED ORAL at 20:38

## 2017-07-13 RX ADMIN — DIVALPROEX SODIUM 500 MILLIGRAM(S): 500 TABLET, DELAYED RELEASE ORAL at 20:38

## 2017-07-14 PROCEDURE — 99232 SBSQ HOSP IP/OBS MODERATE 35: CPT

## 2017-07-14 RX ORDER — DIVALPROEX SODIUM 500 MG/1
500 TABLET, DELAYED RELEASE ORAL
Qty: 0 | Refills: 0 | Status: DISCONTINUED | OUTPATIENT
Start: 2017-07-15 | End: 2017-07-17

## 2017-07-14 RX ADMIN — DIVALPROEX SODIUM 500 MILLIGRAM(S): 500 TABLET, DELAYED RELEASE ORAL at 08:56

## 2017-07-14 RX ADMIN — Medication 2 MILLIGRAM(S): at 09:51

## 2017-07-14 RX ADMIN — Medication 2 MILLIGRAM(S): at 17:27

## 2017-07-14 RX ADMIN — Medication 2 MILLIGRAM(S): at 11:59

## 2017-07-14 RX ADMIN — OLANZAPINE 10 MILLIGRAM(S): 15 TABLET, FILM COATED ORAL at 08:56

## 2017-07-14 RX ADMIN — Medication 2 MILLIGRAM(S): at 07:51

## 2017-07-14 RX ADMIN — OLANZAPINE 10 MILLIGRAM(S): 15 TABLET, FILM COATED ORAL at 20:07

## 2017-07-14 RX ADMIN — Medication 2 MILLIGRAM(S): at 15:55

## 2017-07-15 PROCEDURE — 99232 SBSQ HOSP IP/OBS MODERATE 35: CPT

## 2017-07-15 RX ADMIN — Medication 2 MILLIGRAM(S): at 07:41

## 2017-07-15 RX ADMIN — OLANZAPINE 10 MILLIGRAM(S): 15 TABLET, FILM COATED ORAL at 09:32

## 2017-07-15 RX ADMIN — Medication 2 MILLIGRAM(S): at 11:16

## 2017-07-15 RX ADMIN — DIVALPROEX SODIUM 500 MILLIGRAM(S): 500 TABLET, DELAYED RELEASE ORAL at 21:01

## 2017-07-15 RX ADMIN — DIVALPROEX SODIUM 500 MILLIGRAM(S): 500 TABLET, DELAYED RELEASE ORAL at 09:32

## 2017-07-15 RX ADMIN — OLANZAPINE 10 MILLIGRAM(S): 15 TABLET, FILM COATED ORAL at 21:01

## 2017-07-15 RX ADMIN — Medication 2 MILLIGRAM(S): at 21:05

## 2017-07-15 RX ADMIN — Medication 2 MILLIGRAM(S): at 14:26

## 2017-07-16 LAB — VALPROATE SERPL-MCNC: 61.2 UG/ML — SIGNIFICANT CHANGE UP (ref 50–100)

## 2017-07-16 PROCEDURE — 99232 SBSQ HOSP IP/OBS MODERATE 35: CPT

## 2017-07-16 RX ORDER — ACETAMINOPHEN 500 MG
650 TABLET ORAL ONCE
Qty: 0 | Refills: 0 | Status: DISCONTINUED | OUTPATIENT
Start: 2017-07-16 | End: 2017-07-17

## 2017-07-16 RX ADMIN — Medication 2 MILLIGRAM(S): at 19:15

## 2017-07-16 RX ADMIN — DIVALPROEX SODIUM 500 MILLIGRAM(S): 500 TABLET, DELAYED RELEASE ORAL at 08:45

## 2017-07-16 RX ADMIN — Medication 2 MILLIGRAM(S): at 10:39

## 2017-07-16 RX ADMIN — Medication 2 MILLIGRAM(S): at 12:46

## 2017-07-16 RX ADMIN — Medication 2 MILLIGRAM(S): at 15:11

## 2017-07-16 RX ADMIN — DIVALPROEX SODIUM 500 MILLIGRAM(S): 500 TABLET, DELAYED RELEASE ORAL at 20:33

## 2017-07-16 RX ADMIN — OLANZAPINE 10 MILLIGRAM(S): 15 TABLET, FILM COATED ORAL at 20:33

## 2017-07-16 RX ADMIN — OLANZAPINE 10 MILLIGRAM(S): 15 TABLET, FILM COATED ORAL at 08:45

## 2017-07-17 VITALS — TEMPERATURE: 99 F

## 2017-07-17 PROCEDURE — 99232 SBSQ HOSP IP/OBS MODERATE 35: CPT

## 2017-07-17 RX ORDER — DIVALPROEX SODIUM 500 MG/1
1 TABLET, DELAYED RELEASE ORAL
Qty: 60 | Refills: 0 | OUTPATIENT
Start: 2017-07-17 | End: 2017-08-16

## 2017-07-17 RX ORDER — OLANZAPINE 15 MG/1
1 TABLET, FILM COATED ORAL
Qty: 60 | Refills: 0 | OUTPATIENT
Start: 2017-07-17 | End: 2017-08-16

## 2017-07-17 RX ORDER — NICOTINE POLACRILEX 2 MG
1 GUM BUCCAL
Qty: 180 | Refills: 0 | OUTPATIENT
Start: 2017-07-17 | End: 2017-08-16

## 2017-07-17 RX ORDER — NICOTINE POLACRILEX 2 MG
1 GUM BUCCAL
Qty: 180 | Refills: 0
Start: 2017-07-17 | End: 2017-08-16

## 2017-07-17 RX ORDER — OLANZAPINE 15 MG/1
1 TABLET, FILM COATED ORAL
Qty: 60 | Refills: 0
Start: 2017-07-17 | End: 2017-08-16

## 2017-07-17 RX ORDER — DIVALPROEX SODIUM 500 MG/1
1 TABLET, DELAYED RELEASE ORAL
Qty: 60 | Refills: 0
Start: 2017-07-17 | End: 2017-08-16

## 2017-07-17 RX ADMIN — OLANZAPINE 10 MILLIGRAM(S): 15 TABLET, FILM COATED ORAL at 08:48

## 2017-07-17 RX ADMIN — Medication 1 TABLET(S): at 08:48

## 2017-07-17 RX ADMIN — DIVALPROEX SODIUM 500 MILLIGRAM(S): 500 TABLET, DELAYED RELEASE ORAL at 08:48

## 2017-07-17 RX ADMIN — Medication 2 MILLIGRAM(S): at 10:23

## 2017-07-17 RX ADMIN — Medication 2 MILLIGRAM(S): at 08:22

## 2017-07-17 RX ADMIN — Medication 2 MILLIGRAM(S): at 12:47

## 2017-07-20 ENCOUNTER — OUTPATIENT (OUTPATIENT)
Dept: OUTPATIENT SERVICES | Facility: HOSPITAL | Age: 47
LOS: 1 days | Discharge: HOME | End: 2017-07-20
Payer: MEDICAID

## 2017-07-21 DIAGNOSIS — F31.77 BIPOLAR DISORDER, IN PARTIAL REMISSION, MOST RECENT EPISODE MIXED: ICD-10-CM

## 2017-09-20 LAB
CHOLEST SERPL-MCNC: 195 MG/DL — SIGNIFICANT CHANGE UP (ref 120–199)
HBA1C BLD-MCNC: 5.5 % — SIGNIFICANT CHANGE UP (ref 4–5.6)
HDLC SERPL-MCNC: 67 MG/DL — HIGH (ref 45–65)
LIPID PNL WITH DIRECT LDL SERPL: 124 MG/DL — SIGNIFICANT CHANGE UP
TRIGL SERPL-MCNC: 92 MG/DL — SIGNIFICANT CHANGE UP (ref 10–149)
VALPROATE SERPL-MCNC: 31.4 UG/ML — LOW (ref 50–100)

## 2018-03-29 LAB — VALPROATE SERPL-MCNC: 70.3 UG/ML — SIGNIFICANT CHANGE UP (ref 50–100)

## 2019-03-19 LAB
ALBUMIN SERPL ELPH-MCNC: 4.1 G/DL — SIGNIFICANT CHANGE UP (ref 3.3–5)
ALP SERPL-CCNC: 49 U/L — SIGNIFICANT CHANGE UP (ref 40–120)
ALT FLD-CCNC: 47 U/L — HIGH (ref 4–33)
ANION GAP SERPL CALC-SCNC: 12 MMO/L — SIGNIFICANT CHANGE UP (ref 7–14)
AST SERPL-CCNC: 35 U/L — HIGH (ref 4–32)
BASOPHILS # BLD AUTO: 0.06 K/UL — SIGNIFICANT CHANGE UP (ref 0–0.2)
BASOPHILS NFR BLD AUTO: 0.7 % — SIGNIFICANT CHANGE UP (ref 0–2)
BILIRUB SERPL-MCNC: < 0.2 MG/DL — LOW (ref 0.2–1.2)
BUN SERPL-MCNC: 20 MG/DL — SIGNIFICANT CHANGE UP (ref 7–23)
CALCIUM SERPL-MCNC: 9.3 MG/DL — SIGNIFICANT CHANGE UP (ref 8.4–10.5)
CHLORIDE SERPL-SCNC: 98 MMOL/L — SIGNIFICANT CHANGE UP (ref 98–107)
CHOLEST SERPL-MCNC: 183.2 MG/DL — SIGNIFICANT CHANGE UP (ref 120–199)
CO2 SERPL-SCNC: 26 MMOL/L — SIGNIFICANT CHANGE UP (ref 22–31)
CREAT SERPL-MCNC: 0.7 MG/DL — SIGNIFICANT CHANGE UP (ref 0.5–1.3)
EOSINOPHIL # BLD AUTO: 0.33 K/UL — SIGNIFICANT CHANGE UP (ref 0–0.5)
EOSINOPHIL NFR BLD AUTO: 3.7 % — SIGNIFICANT CHANGE UP (ref 0–6)
GLUCOSE SERPL-MCNC: 99 MG/DL — SIGNIFICANT CHANGE UP (ref 70–99)
HBA1C BLD-MCNC: 5.6 % — SIGNIFICANT CHANGE UP (ref 4–5.6)
HCT VFR BLD CALC: 34.2 % — LOW (ref 34.5–45)
HDLC SERPL-MCNC: 50 MG/DL — SIGNIFICANT CHANGE UP (ref 45–65)
HGB BLD-MCNC: 10.3 G/DL — LOW (ref 11.5–15.5)
IMM GRANULOCYTES NFR BLD AUTO: 1 % — SIGNIFICANT CHANGE UP (ref 0–1.5)
LIPID PNL WITH DIRECT LDL SERPL: 110 MG/DL — SIGNIFICANT CHANGE UP
LYMPHOCYTES # BLD AUTO: 1.99 K/UL — SIGNIFICANT CHANGE UP (ref 1–3.3)
LYMPHOCYTES # BLD AUTO: 22 % — SIGNIFICANT CHANGE UP (ref 13–44)
MCHC RBC-ENTMCNC: 29.3 PG — SIGNIFICANT CHANGE UP (ref 27–34)
MCHC RBC-ENTMCNC: 30.1 % — LOW (ref 32–36)
MCV RBC AUTO: 97.4 FL — SIGNIFICANT CHANGE UP (ref 80–100)
MONOCYTES # BLD AUTO: 0.61 K/UL — SIGNIFICANT CHANGE UP (ref 0–0.9)
MONOCYTES NFR BLD AUTO: 6.7 % — SIGNIFICANT CHANGE UP (ref 2–14)
NEUTROPHILS # BLD AUTO: 5.96 K/UL — SIGNIFICANT CHANGE UP (ref 1.8–7.4)
NEUTROPHILS NFR BLD AUTO: 65.9 % — SIGNIFICANT CHANGE UP (ref 43–77)
NRBC # FLD: 0 K/UL — LOW (ref 25–125)
PLATELET # BLD AUTO: 285 K/UL — SIGNIFICANT CHANGE UP (ref 150–400)
PMV BLD: 11.2 FL — SIGNIFICANT CHANGE UP (ref 7–13)
POTASSIUM SERPL-MCNC: 4.4 MMOL/L — SIGNIFICANT CHANGE UP (ref 3.5–5.3)
POTASSIUM SERPL-SCNC: 4.4 MMOL/L — SIGNIFICANT CHANGE UP (ref 3.5–5.3)
PROT SERPL-MCNC: 6.1 G/DL — SIGNIFICANT CHANGE UP (ref 6–8.3)
RBC # BLD: 3.51 M/UL — LOW (ref 3.8–5.2)
RBC # FLD: 14.2 % — SIGNIFICANT CHANGE UP (ref 10.3–14.5)
SODIUM SERPL-SCNC: 136 MMOL/L — SIGNIFICANT CHANGE UP (ref 135–145)
TRIGL SERPL-MCNC: 170 MG/DL — HIGH (ref 10–149)
VALPROATE SERPL-MCNC: 63.3 UG/ML — SIGNIFICANT CHANGE UP (ref 50–100)
WBC # BLD: 9.04 K/UL — SIGNIFICANT CHANGE UP (ref 3.8–10.5)
WBC # FLD AUTO: 9.04 K/UL — SIGNIFICANT CHANGE UP (ref 3.8–10.5)

## 2019-10-01 LAB
ALBUMIN SERPL ELPH-MCNC: 4.1 G/DL — SIGNIFICANT CHANGE UP (ref 3.3–5)
ALP SERPL-CCNC: 48 U/L — SIGNIFICANT CHANGE UP (ref 40–120)
ALT FLD-CCNC: 11 U/L — SIGNIFICANT CHANGE UP (ref 4–33)
ANION GAP SERPL CALC-SCNC: 14 MMO/L — SIGNIFICANT CHANGE UP (ref 7–14)
AST SERPL-CCNC: 11 U/L — SIGNIFICANT CHANGE UP (ref 4–32)
BASOPHILS # BLD AUTO: 0.06 K/UL — SIGNIFICANT CHANGE UP (ref 0–0.2)
BASOPHILS NFR BLD AUTO: 0.7 % — SIGNIFICANT CHANGE UP (ref 0–2)
BILIRUB SERPL-MCNC: < 0.2 MG/DL — LOW (ref 0.2–1.2)
BUN SERPL-MCNC: 20 MG/DL — SIGNIFICANT CHANGE UP (ref 7–23)
CALCIUM SERPL-MCNC: 9.5 MG/DL — SIGNIFICANT CHANGE UP (ref 8.4–10.5)
CHLORIDE SERPL-SCNC: 103 MMOL/L — SIGNIFICANT CHANGE UP (ref 98–107)
CHOLEST SERPL-MCNC: 185 MG/DL — SIGNIFICANT CHANGE UP (ref 120–199)
CO2 SERPL-SCNC: 22 MMOL/L — SIGNIFICANT CHANGE UP (ref 22–31)
CREAT SERPL-MCNC: 0.64 MG/DL — SIGNIFICANT CHANGE UP (ref 0.5–1.3)
EOSINOPHIL # BLD AUTO: 0.18 K/UL — SIGNIFICANT CHANGE UP (ref 0–0.5)
EOSINOPHIL NFR BLD AUTO: 2.2 % — SIGNIFICANT CHANGE UP (ref 0–6)
GLUCOSE SERPL-MCNC: 97 MG/DL — SIGNIFICANT CHANGE UP (ref 70–99)
HBA1C BLD-MCNC: 5.7 % — HIGH (ref 4–5.6)
HCT VFR BLD CALC: 33.4 % — LOW (ref 34.5–45)
HDLC SERPL-MCNC: 43 MG/DL — LOW (ref 45–65)
HGB BLD-MCNC: 10.8 G/DL — LOW (ref 11.5–15.5)
IMM GRANULOCYTES NFR BLD AUTO: 1.2 % — SIGNIFICANT CHANGE UP (ref 0–1.5)
LIPID PNL WITH DIRECT LDL SERPL: 124 MG/DL — SIGNIFICANT CHANGE UP
LYMPHOCYTES # BLD AUTO: 2.77 K/UL — SIGNIFICANT CHANGE UP (ref 1–3.3)
LYMPHOCYTES # BLD AUTO: 34.4 % — SIGNIFICANT CHANGE UP (ref 13–44)
MCHC RBC-ENTMCNC: 28.9 PG — SIGNIFICANT CHANGE UP (ref 27–34)
MCHC RBC-ENTMCNC: 32.3 % — SIGNIFICANT CHANGE UP (ref 32–36)
MCV RBC AUTO: 89.3 FL — SIGNIFICANT CHANGE UP (ref 80–100)
MONOCYTES # BLD AUTO: 0.7 K/UL — SIGNIFICANT CHANGE UP (ref 0–0.9)
MONOCYTES NFR BLD AUTO: 8.7 % — SIGNIFICANT CHANGE UP (ref 2–14)
NEUTROPHILS # BLD AUTO: 4.25 K/UL — SIGNIFICANT CHANGE UP (ref 1.8–7.4)
NEUTROPHILS NFR BLD AUTO: 52.8 % — SIGNIFICANT CHANGE UP (ref 43–77)
NRBC # FLD: 0 K/UL — SIGNIFICANT CHANGE UP (ref 0–0)
PLATELET # BLD AUTO: 302 K/UL — SIGNIFICANT CHANGE UP (ref 150–400)
PMV BLD: 11.5 FL — SIGNIFICANT CHANGE UP (ref 7–13)
POTASSIUM SERPL-MCNC: 4.3 MMOL/L — SIGNIFICANT CHANGE UP (ref 3.5–5.3)
POTASSIUM SERPL-SCNC: 4.3 MMOL/L — SIGNIFICANT CHANGE UP (ref 3.5–5.3)
PROT SERPL-MCNC: 7 G/DL — SIGNIFICANT CHANGE UP (ref 6–8.3)
RBC # BLD: 3.74 M/UL — LOW (ref 3.8–5.2)
RBC # FLD: 14.3 % — SIGNIFICANT CHANGE UP (ref 10.3–14.5)
SODIUM SERPL-SCNC: 139 MMOL/L — SIGNIFICANT CHANGE UP (ref 135–145)
TRIGL SERPL-MCNC: 107 MG/DL — SIGNIFICANT CHANGE UP (ref 10–149)
VALPROATE SERPL-MCNC: 98.1 UG/ML — SIGNIFICANT CHANGE UP (ref 50–100)
WBC # BLD: 8.06 K/UL — SIGNIFICANT CHANGE UP (ref 3.8–10.5)
WBC # FLD AUTO: 8.06 K/UL — SIGNIFICANT CHANGE UP (ref 3.8–10.5)

## 2019-12-17 LAB
ALBUMIN SERPL ELPH-MCNC: 4 G/DL — SIGNIFICANT CHANGE UP (ref 3.3–5)
ALP SERPL-CCNC: 55 U/L — SIGNIFICANT CHANGE UP (ref 40–120)
ALT FLD-CCNC: 12 U/L — SIGNIFICANT CHANGE UP (ref 4–33)
ANION GAP SERPL CALC-SCNC: 15 MMO/L — HIGH (ref 7–14)
AST SERPL-CCNC: 11 U/L — SIGNIFICANT CHANGE UP (ref 4–32)
BASOPHILS # BLD AUTO: 0.07 K/UL — SIGNIFICANT CHANGE UP (ref 0–0.2)
BASOPHILS NFR BLD AUTO: 0.8 % — SIGNIFICANT CHANGE UP (ref 0–2)
BILIRUB SERPL-MCNC: < 0.2 MG/DL — LOW (ref 0.2–1.2)
BUN SERPL-MCNC: 22 MG/DL — SIGNIFICANT CHANGE UP (ref 7–23)
CALCIUM SERPL-MCNC: 9.4 MG/DL — SIGNIFICANT CHANGE UP (ref 8.4–10.5)
CHLORIDE SERPL-SCNC: 100 MMOL/L — SIGNIFICANT CHANGE UP (ref 98–107)
CHOLEST SERPL-MCNC: 210 MG/DL — HIGH (ref 120–199)
CO2 SERPL-SCNC: 22 MMOL/L — SIGNIFICANT CHANGE UP (ref 22–31)
CREAT SERPL-MCNC: 0.69 MG/DL — SIGNIFICANT CHANGE UP (ref 0.5–1.3)
EOSINOPHIL # BLD AUTO: 0.3 K/UL — SIGNIFICANT CHANGE UP (ref 0–0.5)
EOSINOPHIL NFR BLD AUTO: 3.6 % — SIGNIFICANT CHANGE UP (ref 0–6)
GLUCOSE SERPL-MCNC: 117 MG/DL — HIGH (ref 70–99)
HBA1C BLD-MCNC: 5.6 % — SIGNIFICANT CHANGE UP (ref 4–5.6)
HCT VFR BLD CALC: 35 % — SIGNIFICANT CHANGE UP (ref 34.5–45)
HDLC SERPL-MCNC: 34 MG/DL — LOW (ref 45–65)
HGB BLD-MCNC: 11.2 G/DL — LOW (ref 11.5–15.5)
IMM GRANULOCYTES NFR BLD AUTO: 0.7 % — SIGNIFICANT CHANGE UP (ref 0–1.5)
LIPID PNL WITH DIRECT LDL SERPL: 120 MG/DL — SIGNIFICANT CHANGE UP
LYMPHOCYTES # BLD AUTO: 3.07 K/UL — SIGNIFICANT CHANGE UP (ref 1–3.3)
LYMPHOCYTES # BLD AUTO: 36.6 % — SIGNIFICANT CHANGE UP (ref 13–44)
MCHC RBC-ENTMCNC: 29.7 PG — SIGNIFICANT CHANGE UP (ref 27–34)
MCHC RBC-ENTMCNC: 32 % — SIGNIFICANT CHANGE UP (ref 32–36)
MCV RBC AUTO: 92.8 FL — SIGNIFICANT CHANGE UP (ref 80–100)
MONOCYTES # BLD AUTO: 0.81 K/UL — SIGNIFICANT CHANGE UP (ref 0–0.9)
MONOCYTES NFR BLD AUTO: 9.7 % — SIGNIFICANT CHANGE UP (ref 2–14)
NEUTROPHILS # BLD AUTO: 4.08 K/UL — SIGNIFICANT CHANGE UP (ref 1.8–7.4)
NEUTROPHILS NFR BLD AUTO: 48.6 % — SIGNIFICANT CHANGE UP (ref 43–77)
NRBC # FLD: 0 K/UL — SIGNIFICANT CHANGE UP (ref 0–0)
PLATELET # BLD AUTO: 282 K/UL — SIGNIFICANT CHANGE UP (ref 150–400)
PMV BLD: 11.2 FL — SIGNIFICANT CHANGE UP (ref 7–13)
POTASSIUM SERPL-MCNC: 4.1 MMOL/L — SIGNIFICANT CHANGE UP (ref 3.5–5.3)
POTASSIUM SERPL-SCNC: 4.1 MMOL/L — SIGNIFICANT CHANGE UP (ref 3.5–5.3)
PROT SERPL-MCNC: 7.1 G/DL — SIGNIFICANT CHANGE UP (ref 6–8.3)
RBC # BLD: 3.77 M/UL — LOW (ref 3.8–5.2)
RBC # FLD: 13.8 % — SIGNIFICANT CHANGE UP (ref 10.3–14.5)
SODIUM SERPL-SCNC: 137 MMOL/L — SIGNIFICANT CHANGE UP (ref 135–145)
TRIGL SERPL-MCNC: 411 MG/DL — HIGH (ref 10–149)
VALPROATE SERPL-MCNC: 72.5 UG/ML — SIGNIFICANT CHANGE UP (ref 50–100)
WBC # BLD: 8.39 K/UL — SIGNIFICANT CHANGE UP (ref 3.8–10.5)
WBC # FLD AUTO: 8.39 K/UL — SIGNIFICANT CHANGE UP (ref 3.8–10.5)

## 2020-01-24 NOTE — ED BEHAVIORAL HEALTH ASSESSMENT NOTE - ESTIMATED INTELLIGENCE
Patient was returning Dominga's call. Dominga was calling about the fit kit collection date. Spoke with patient and informed dominga of date   Average

## 2020-07-24 PROBLEM — Z00.00 ENCOUNTER FOR PREVENTIVE HEALTH EXAMINATION: Status: ACTIVE | Noted: 2020-07-24

## 2020-08-11 ENCOUNTER — OUTPATIENT (OUTPATIENT)
Dept: OUTPATIENT SERVICES | Facility: HOSPITAL | Age: 50
LOS: 1 days | End: 2020-08-11
Payer: COMMERCIAL

## 2020-08-11 ENCOUNTER — APPOINTMENT (OUTPATIENT)
Dept: ULTRASOUND IMAGING | Facility: IMAGING CENTER | Age: 50
End: 2020-08-11

## 2020-08-11 ENCOUNTER — APPOINTMENT (OUTPATIENT)
Dept: MAMMOGRAPHY | Facility: IMAGING CENTER | Age: 50
End: 2020-08-11
Payer: COMMERCIAL

## 2020-08-11 DIAGNOSIS — Z00.8 ENCOUNTER FOR OTHER GENERAL EXAMINATION: ICD-10-CM

## 2020-08-11 PROCEDURE — 76641 ULTRASOUND BREAST COMPLETE: CPT | Mod: 26,50

## 2020-08-11 PROCEDURE — G0279: CPT

## 2020-08-11 PROCEDURE — G0279: CPT | Mod: 26

## 2020-08-11 PROCEDURE — 77066 DX MAMMO INCL CAD BI: CPT | Mod: 26

## 2020-08-11 PROCEDURE — 77066 DX MAMMO INCL CAD BI: CPT

## 2020-08-11 PROCEDURE — 76641 ULTRASOUND BREAST COMPLETE: CPT

## 2020-08-17 ENCOUNTER — RESULT REVIEW (OUTPATIENT)
Age: 50
End: 2020-08-17

## 2020-08-17 ENCOUNTER — OUTPATIENT (OUTPATIENT)
Dept: OUTPATIENT SERVICES | Facility: HOSPITAL | Age: 50
LOS: 1 days | End: 2020-08-17
Payer: COMMERCIAL

## 2020-08-17 ENCOUNTER — APPOINTMENT (OUTPATIENT)
Dept: ULTRASOUND IMAGING | Facility: IMAGING CENTER | Age: 50
End: 2020-08-17
Payer: COMMERCIAL

## 2020-08-17 DIAGNOSIS — Z00.8 ENCOUNTER FOR OTHER GENERAL EXAMINATION: ICD-10-CM

## 2020-08-17 PROCEDURE — 19083 BX BREAST 1ST LESION US IMAG: CPT

## 2020-08-17 PROCEDURE — 88360 TUMOR IMMUNOHISTOCHEM/MANUAL: CPT | Mod: 26

## 2020-08-17 PROCEDURE — 88360 TUMOR IMMUNOHISTOCHEM/MANUAL: CPT

## 2020-08-17 PROCEDURE — 88305 TISSUE EXAM BY PATHOLOGIST: CPT

## 2020-08-17 PROCEDURE — 88305 TISSUE EXAM BY PATHOLOGIST: CPT | Mod: 26

## 2020-08-17 PROCEDURE — 19083 BX BREAST 1ST LESION US IMAG: CPT | Mod: RT

## 2020-08-18 LAB — SURGICAL PATHOLOGY STUDY: SIGNIFICANT CHANGE UP

## 2021-02-10 NOTE — ED ADULT NURSE NOTE - ATTEMPT TO OOB
no Cheiloplasty (Less Than 50%) Text: A decision was made to reconstruct the defect with a  cheiloplasty.  The defect was undermined extensively.  Additional obicularis oris muscle was excised with a 15c blade scalpel.  The defect was converted into a full thickness wedge, of less than 50% of the vertical height of the lip, to facilite a better cosmetic result.  Small vessels were then tied off with 5-0 monocyrl. The obicularis oris, superficial fascia, adipose and dermis were then reapproximated.  After the deeper layers were approximated the epidermis was reapproximated with particular care given to realign the vermilion border.

## 2021-02-12 NOTE — ED PROVIDER NOTE - PROGRESS NOTE DETAILS
"MD Notification    Notified Person: MD    Notified Person Name: Dr Peoples    Notification Date/Time:1030    Notification Interaction: text paged    Purpose of Notification:  -Resp more labored,accessory muscle use, pursed lip breathing, Has bilateral expir wheezes and fine basilar rales.  Is  SOB at rest and O2 sats mostly only mid 80's most of time. Writer paged RT at 0900 who arrived at bedside by approx 1030 to give neb RX.  Daughter told writer that Pt has recent \"diagnosed with  Pneumonia\" via a CXR done  2-8-21 at her  LTC facility and was started then on an oral antibiotic - Pt is currently NOT on any antibiotics. I notified the surgeon  Dr. Fall of above who assessed pt at bedside this am   MD asking Hospitalist to assess pt early this shift and advise.    Comments:awaiting reply      " Pt. seen by psych. Pt. will be admitted. Pt. accepted at [facility]. Pt. is calm and stable during her .

## 2021-03-04 PROCEDURE — 99214 OFFICE O/P EST MOD 30 MIN: CPT | Mod: 95

## 2021-07-12 PROCEDURE — 99214 OFFICE O/P EST MOD 30 MIN: CPT

## 2021-07-22 ENCOUNTER — APPOINTMENT (OUTPATIENT)
Dept: MAMMOGRAPHY | Facility: IMAGING CENTER | Age: 51
End: 2021-07-22
Payer: COMMERCIAL

## 2021-07-22 ENCOUNTER — OUTPATIENT (OUTPATIENT)
Dept: OUTPATIENT SERVICES | Facility: HOSPITAL | Age: 51
LOS: 1 days | End: 2021-07-22
Payer: COMMERCIAL

## 2021-07-22 ENCOUNTER — APPOINTMENT (OUTPATIENT)
Dept: ULTRASOUND IMAGING | Facility: IMAGING CENTER | Age: 51
End: 2021-07-22
Payer: COMMERCIAL

## 2021-07-22 DIAGNOSIS — Z00.8 ENCOUNTER FOR OTHER GENERAL EXAMINATION: ICD-10-CM

## 2021-07-22 PROCEDURE — 77066 DX MAMMO INCL CAD BI: CPT | Mod: 26

## 2021-07-22 PROCEDURE — G0279: CPT

## 2021-07-22 PROCEDURE — 77066 DX MAMMO INCL CAD BI: CPT

## 2021-07-22 PROCEDURE — G0279: CPT | Mod: 26

## 2022-01-24 PROCEDURE — 99214 OFFICE O/P EST MOD 30 MIN: CPT

## 2022-03-30 NOTE — ED PROVIDER NOTE - CPE EDP CARDIAC NORM
DISCHARGE SUMMARY    Anette Flynn  : 1991  MRN: [id number]    The patient Nalini Layton exhibits the ability to control behavior in a less restrictive environment. Patient's level of functioning is improving. No assaultive/destructive behavior has been observed for the past 24 hours. No suicidal/homicidal threat or behavior has been observed for the past 24 hours. There is no evidence of serious medication side effects. Patient has not been in physical or protective restraints for at least the past 24 hours. If weapons involved, how are they secured? none    Is patient aware of and in agreement with discharge plan? Yes    Arrangements for medication:  Prescriptions given to patient, given a weeks supply or 30 day supply. Copy of discharge instructions to provider?:  yes    Arrangements for transportation home:  Father will     Keep all follow up appointments as scheduled, continue to take prescribed medications per physician instructions. Mental health crisis number:  292 or your local mental health crisis line number at [address] [facility] WARM LINE      5-371-750-MHAV 1155)      M-F: 9am to 9pm      Sat & Sun: 5pm - 9pm  National suicide prevention lines:                             0-994-JZFZNRK (4-100-532-180-346-7175)       1-801-319-TALK ([phone])    Crisis Text Line:  Text HOME to [address] from Nurse    PATIENT INSTRUCTIONS:    What to do at Home:  Recommended activity: Activity as tolerated,         *  Please give a list of your current medications to your Primary Care Provider. *  Please update this list whenever your medications are discontinued, doses are      changed, or new medications (including over-the-counter products) are added. *  Please carry medication information at all times in case of emergency situations.     These are general instructions for a healthy lifestyle:    No smoking/ No tobacco products/ Avoid exposure to second hand smoke  Surgeon General's Warning:  Quitting smoking now greatly reduces serious risk to your health. Obesity, smoking, and sedentary lifestyle greatly increases your risk for illness    A healthy diet, regular physical exercise & weight monitoring are important for maintaining a healthy lifestyle    You may be retaining fluid if you have a history of heart failure or if you experience any of the following symptoms:  Weight gain of 3 pounds or more overnight or 5 pounds in a week, increased swelling in our hands or feet or shortness of breath while lying flat in bed. Please call your doctor as soon as you notice any of these symptoms; do not wait until your next office visit. The discharge information has been reviewed with the patient. The patient verbalized understanding. Discharge medications reviewed with the patient and appropriate educational materials and side effects teaching were provided.   ___________________________________________________________________________________________________________________________________ normal...

## 2022-06-06 ENCOUNTER — INPATIENT (INPATIENT)
Facility: HOSPITAL | Age: 52
LOS: 21 days | Discharge: ANOTHER HOSPITAL | End: 2022-06-28
Attending: PSYCHIATRY & NEUROLOGY | Admitting: PSYCHIATRY & NEUROLOGY
Payer: MEDICAID

## 2022-06-06 VITALS
OXYGEN SATURATION: 99 % | HEART RATE: 98 BPM | DIASTOLIC BLOOD PRESSURE: 85 MMHG | RESPIRATION RATE: 18 BRPM | HEIGHT: 63 IN | SYSTOLIC BLOOD PRESSURE: 138 MMHG

## 2022-06-06 DIAGNOSIS — F31.9 BIPOLAR DISORDER, UNSPECIFIED: ICD-10-CM

## 2022-06-06 LAB
A1C WITH ESTIMATED AVERAGE GLUCOSE RESULT: 5.2 % — SIGNIFICANT CHANGE UP (ref 4–5.6)
ALBUMIN SERPL ELPH-MCNC: 4.2 G/DL — SIGNIFICANT CHANGE UP (ref 3.3–5)
ALP SERPL-CCNC: 77 U/L — SIGNIFICANT CHANGE UP (ref 40–120)
ALT FLD-CCNC: 32 U/L — SIGNIFICANT CHANGE UP (ref 4–33)
AMPHET UR-MCNC: NEGATIVE — SIGNIFICANT CHANGE UP
ANION GAP SERPL CALC-SCNC: 18 MMOL/L — HIGH (ref 7–14)
APAP SERPL-MCNC: <10 UG/ML — LOW (ref 15–25)
APPEARANCE UR: CLEAR — SIGNIFICANT CHANGE UP
AST SERPL-CCNC: 48 U/L — HIGH (ref 4–32)
BACTERIA # UR AUTO: ABNORMAL
BARBITURATES UR SCN-MCNC: NEGATIVE — SIGNIFICANT CHANGE UP
BASOPHILS # BLD AUTO: 0.05 K/UL — SIGNIFICANT CHANGE UP (ref 0–0.2)
BASOPHILS NFR BLD AUTO: 0.5 % — SIGNIFICANT CHANGE UP (ref 0–2)
BENZODIAZ UR-MCNC: NEGATIVE — SIGNIFICANT CHANGE UP
BILIRUB SERPL-MCNC: 0.4 MG/DL — SIGNIFICANT CHANGE UP (ref 0.2–1.2)
BILIRUB UR-MCNC: NEGATIVE — SIGNIFICANT CHANGE UP
BUN SERPL-MCNC: 11 MG/DL — SIGNIFICANT CHANGE UP (ref 7–23)
CALCIUM SERPL-MCNC: 9.2 MG/DL — SIGNIFICANT CHANGE UP (ref 8.4–10.5)
CHLORIDE SERPL-SCNC: 100 MMOL/L — SIGNIFICANT CHANGE UP (ref 98–107)
CO2 SERPL-SCNC: 21 MMOL/L — LOW (ref 22–31)
COCAINE METAB.OTHER UR-MCNC: NEGATIVE — SIGNIFICANT CHANGE UP
COLOR SPEC: YELLOW — SIGNIFICANT CHANGE UP
COVID-19 SPIKE DOMAIN AB INTERP: POSITIVE
COVID-19 SPIKE DOMAIN ANTIBODY RESULT: >250 U/ML — HIGH
CREAT SERPL-MCNC: 1.07 MG/DL — SIGNIFICANT CHANGE UP (ref 0.5–1.3)
CREATININE URINE RESULT, DAU: 179 MG/DL — SIGNIFICANT CHANGE UP
DIFF PNL FLD: NEGATIVE — SIGNIFICANT CHANGE UP
EGFR: 64 ML/MIN/1.73M2 — SIGNIFICANT CHANGE UP
EOSINOPHIL # BLD AUTO: 0.31 K/UL — SIGNIFICANT CHANGE UP (ref 0–0.5)
EOSINOPHIL NFR BLD AUTO: 3.2 % — SIGNIFICANT CHANGE UP (ref 0–6)
EPI CELLS # UR: 9 /HPF — HIGH (ref 0–5)
ESTIMATED AVERAGE GLUCOSE: 103 — SIGNIFICANT CHANGE UP
ETHANOL SERPL-MCNC: <10 MG/DL — SIGNIFICANT CHANGE UP
FLUAV AG NPH QL: SIGNIFICANT CHANGE UP
FLUBV AG NPH QL: SIGNIFICANT CHANGE UP
GLUCOSE SERPL-MCNC: 131 MG/DL — HIGH (ref 70–99)
GLUCOSE UR QL: NEGATIVE — SIGNIFICANT CHANGE UP
HCT VFR BLD CALC: 33.1 % — LOW (ref 34.5–45)
HGB BLD-MCNC: 10.8 G/DL — LOW (ref 11.5–15.5)
HYALINE CASTS # UR AUTO: 2 /LPF — SIGNIFICANT CHANGE UP (ref 0–7)
IANC: 5.48 K/UL — SIGNIFICANT CHANGE UP (ref 1.8–7.4)
IMM GRANULOCYTES NFR BLD AUTO: 0.5 % — SIGNIFICANT CHANGE UP (ref 0–1.5)
KETONES UR-MCNC: NEGATIVE — SIGNIFICANT CHANGE UP
LEUKOCYTE ESTERASE UR-ACNC: ABNORMAL
LYMPHOCYTES # BLD AUTO: 3.02 K/UL — SIGNIFICANT CHANGE UP (ref 1–3.3)
LYMPHOCYTES # BLD AUTO: 31.6 % — SIGNIFICANT CHANGE UP (ref 13–44)
MCHC RBC-ENTMCNC: 30.5 PG — SIGNIFICANT CHANGE UP (ref 27–34)
MCHC RBC-ENTMCNC: 32.6 GM/DL — SIGNIFICANT CHANGE UP (ref 32–36)
MCV RBC AUTO: 93.5 FL — SIGNIFICANT CHANGE UP (ref 80–100)
METHADONE UR-MCNC: NEGATIVE — SIGNIFICANT CHANGE UP
MONOCYTES # BLD AUTO: 0.64 K/UL — SIGNIFICANT CHANGE UP (ref 0–0.9)
MONOCYTES NFR BLD AUTO: 6.7 % — SIGNIFICANT CHANGE UP (ref 2–14)
NEUTROPHILS # BLD AUTO: 5.48 K/UL — SIGNIFICANT CHANGE UP (ref 1.8–7.4)
NEUTROPHILS NFR BLD AUTO: 57.5 % — SIGNIFICANT CHANGE UP (ref 43–77)
NITRITE UR-MCNC: NEGATIVE — SIGNIFICANT CHANGE UP
NRBC # BLD: 0 /100 WBCS — SIGNIFICANT CHANGE UP
NRBC # FLD: 0 K/UL — SIGNIFICANT CHANGE UP
OPIATES UR-MCNC: NEGATIVE — SIGNIFICANT CHANGE UP
OXYCODONE UR-MCNC: NEGATIVE — SIGNIFICANT CHANGE UP
PCP SPEC-MCNC: SIGNIFICANT CHANGE UP
PCP UR-MCNC: NEGATIVE — SIGNIFICANT CHANGE UP
PH UR: 6 — SIGNIFICANT CHANGE UP (ref 5–8)
PLATELET # BLD AUTO: 319 K/UL — SIGNIFICANT CHANGE UP (ref 150–400)
POTASSIUM SERPL-MCNC: 3.3 MMOL/L — LOW (ref 3.5–5.3)
POTASSIUM SERPL-SCNC: 3.3 MMOL/L — LOW (ref 3.5–5.3)
PROT SERPL-MCNC: 7.2 G/DL — SIGNIFICANT CHANGE UP (ref 6–8.3)
PROT UR-MCNC: ABNORMAL
RBC # BLD: 3.54 M/UL — LOW (ref 3.8–5.2)
RBC # FLD: 13.6 % — SIGNIFICANT CHANGE UP (ref 10.3–14.5)
RBC CASTS # UR COMP ASSIST: 2 /HPF — SIGNIFICANT CHANGE UP (ref 0–4)
RSV RNA NPH QL NAA+NON-PROBE: SIGNIFICANT CHANGE UP
SALICYLATES SERPL-MCNC: <0.3 MG/DL — LOW (ref 15–30)
SARS-COV-2 IGG+IGM SERPL QL IA: >250 U/ML — HIGH
SARS-COV-2 IGG+IGM SERPL QL IA: POSITIVE
SARS-COV-2 RNA SPEC QL NAA+PROBE: SIGNIFICANT CHANGE UP
SODIUM SERPL-SCNC: 139 MMOL/L — SIGNIFICANT CHANGE UP (ref 135–145)
SP GR SPEC: 1.02 — SIGNIFICANT CHANGE UP (ref 1–1.05)
THC UR QL: POSITIVE
TSH SERPL-MCNC: 2.95 UIU/ML — SIGNIFICANT CHANGE UP (ref 0.27–4.2)
UROBILINOGEN FLD QL: ABNORMAL
WBC # BLD: 9.55 K/UL — SIGNIFICANT CHANGE UP (ref 3.8–10.5)
WBC # FLD AUTO: 9.55 K/UL — SIGNIFICANT CHANGE UP (ref 3.8–10.5)
WBC UR QL: 2 /HPF — SIGNIFICANT CHANGE UP (ref 0–5)

## 2022-06-06 PROCEDURE — 99285 EMERGENCY DEPT VISIT HI MDM: CPT | Mod: 25

## 2022-06-06 PROCEDURE — 93010 ELECTROCARDIOGRAM REPORT: CPT

## 2022-06-06 PROCEDURE — 70450 CT HEAD/BRAIN W/O DYE: CPT | Mod: 26,MA

## 2022-06-06 PROCEDURE — 99285 EMERGENCY DEPT VISIT HI MDM: CPT

## 2022-06-06 RX ORDER — HALOPERIDOL DECANOATE 100 MG/ML
5 INJECTION INTRAMUSCULAR EVERY 6 HOURS
Refills: 0 | Status: DISCONTINUED | OUTPATIENT
Start: 2022-06-06 | End: 2022-06-08

## 2022-06-06 RX ORDER — OLANZAPINE 15 MG/1
10 TABLET, FILM COATED ORAL DAILY
Refills: 0 | Status: DISCONTINUED | OUTPATIENT
Start: 2022-06-06 | End: 2022-06-07

## 2022-06-06 RX ORDER — NICOTINE POLACRILEX 2 MG
4 GUM BUCCAL
Refills: 0 | Status: DISCONTINUED | OUTPATIENT
Start: 2022-06-06 | End: 2022-06-28

## 2022-06-06 RX ORDER — DIVALPROEX SODIUM 500 MG/1
1000 TABLET, DELAYED RELEASE ORAL AT BEDTIME
Refills: 0 | Status: DISCONTINUED | OUTPATIENT
Start: 2022-06-06 | End: 2022-06-08

## 2022-06-06 RX ORDER — HALOPERIDOL DECANOATE 100 MG/ML
5 INJECTION INTRAMUSCULAR ONCE
Refills: 0 | Status: DISCONTINUED | OUTPATIENT
Start: 2022-06-06 | End: 2022-06-08

## 2022-06-06 RX ORDER — HALOPERIDOL DECANOATE 100 MG/ML
5 INJECTION INTRAMUSCULAR ONCE
Refills: 0 | Status: COMPLETED | OUTPATIENT
Start: 2022-06-06 | End: 2022-06-06

## 2022-06-06 RX ADMIN — Medication 2 MILLIGRAM(S): at 20:16

## 2022-06-06 RX ADMIN — HALOPERIDOL DECANOATE 5 MILLIGRAM(S): 100 INJECTION INTRAMUSCULAR at 08:45

## 2022-06-06 RX ADMIN — HALOPERIDOL DECANOATE 5 MILLIGRAM(S): 100 INJECTION INTRAMUSCULAR at 20:16

## 2022-06-06 RX ADMIN — DIVALPROEX SODIUM 1000 MILLIGRAM(S): 500 TABLET, DELAYED RELEASE ORAL at 20:16

## 2022-06-06 RX ADMIN — Medication 2 MILLIGRAM(S): at 08:45

## 2022-06-06 NOTE — ED BEHAVIORAL HEALTH ASSESSMENT NOTE - LEGAL HISTORY
remote hx of arrest at age 21 for something involving drugs remote hx of arrest at age 21 for something involving drugs. per [facility] [employer]/ [employer] site: no pending legal cases

## 2022-06-06 NOTE — ED BEHAVIORAL HEALTH ASSESSMENT NOTE - AXIS III
hx of anemia anemia, HTN, breast cA s/p surgery (with ongoing oncologic care at [place]); hx of nontoxic goiter s/p thyroid surgery
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
## 2022-06-06 NOTE — ED BEHAVIORAL HEALTH ASSESSMENT NOTE - DESCRIPTION
the patient is a 46 year old woman who lives alone but possibly at time with mother, admits to being on SSI for bipolar d/o anemia anemia, HTN, breast cA s/p surgery (with ongoing oncologic care at [place]); hx of nontoxic goiter s/p thyroid surgery. per chart review: she lives alone; previously possibly at time, was living with her mother.  currently, unemployed.  is on SSI. she was noted to be agitated, labile; cursing at a particular  ED staff.. was uncooperative, guarded, suspicious. would not change into hospital gown or have blood sample drawn + nasal swab performed.  Pt's attitude was worsening and all the least restrictive measures proved futile. she was offered PO PRN meds but refused. "I don't need any medications", she shouted.  Pt was eventually medicated and place in restraints     Vital Signs Last 24 Hrs  T(C): --  T(F): --  HR: 98 (06 Jun 2022 08:19) (98 - 98)  BP: 138/85 (06 Jun 2022 08:19) (138/85 - 138/85)  BP(mean): --  RR: 18 (06 Jun 2022 08:19) (18 - 18)  SpO2: 99% (06 Jun 2022 08:19) (99% - 99%)

## 2022-06-06 NOTE — ED BEHAVIORAL HEALTH NOTE - BEHAVIORAL HEALTH NOTE
COVID Exposure Screen- Patient  1.	*Have you had a COVID-19 test in the last 90 days?  (  ) Yes   (  ) No   ( X ) Unknown- Reason: _____  IF YES PROCEED TO QUESTION #2. IF NO OR UNKNOWN, PLEASE SKIP TO QUESTION #3.  2.	Date of test(s) and result(s): ________  3.	*Have you tested positive for COVID-19 antibodies? (  ) Yes   (  ) No   ( X ) Unknown- Reason: _____  IF YES PROCEED TO QUESTION #4. IF NO or UNKNOWN, PLEASE SKIP TO QUESTION #5.  4.	Date of positive antibody test: ________  5.	*Have you received 2 doses of the COVID-19 vaccine? ( X ) Yes   (  ) No   (  ) Unknown- Reason: _____   IF YES PROCEED TO QUESTION #6. IF NO or UNKNOWN, PLEASE SKIP TO QUESTION #7.  6.	Date of second dose: ________ claims that she is vaccinated and boosted.. however, unable to provide details re: which vaccine brand and dates of administration of these vaccines   7.	*In the past 10 days, have you been around anyone with a positive COVID-19 test?* (  ) Yes   (  ) No   ( X ) Unknown- Reason: ____  IF YES PROCEED TO QUESTION #8. IF NO or UNKNOWN, PLEASE SKIP TO QUESTION #13.  8.	Were you within 6 feet of them for at least 15 minutes? (  ) Yes   (  ) No   (  ) Unknown- Reason: _____  9.	Have you provided care for them? (  ) Yes   (  ) No   (  ) Unknown- Reason: ______  10.	Have you had direct physical contact with them (touched, hugged, or kissed them)? (  ) Yes   (  ) No    (  ) Unknown- Reason: _____  11.	Have you shared eating or drinking utensils with them? (  ) Yes   (  ) No    (  ) Unknown- Reason: ____  12.	Have they sneezed, coughed, or somehow gotten respiratory droplets on you? (  ) Yes   (  ) No    (  ) Unknown- Reason: ______  13.	*Have you been out of [place] within the past 10 days?* (  ) Yes   ( X ) No   (  ) Unknown- Reason: _____  IF YES PLEASE ANSWER THE FOLLOWING QUESTIONS:  14.	Which state/country have you been to? ______  15.	Were you there over 24 hours? (  ) Yes   (  ) No    (  ) Unknown- Reason: ______  16.	Date of return to [facility]: ______

## 2022-06-06 NOTE — BH PATIENT PROFILE - FALL HARM RISK - UNIVERSAL INTERVENTIONS
Bed in lowest position, wheels locked, appropriate side rails in place/Call bell, personal items and telephone in reach/Instruct patient to call for assistance before getting out of bed or chair/Non-slip footwear when patient is out of bed/[place] to call system/Physically safe environment - no spills, clutter or unnecessary equipment/Purposeful Proactive Rounding/Room/bathroom lighting operational, light cord in reach

## 2022-06-06 NOTE — ED BEHAVIORAL HEALTH ASSESSMENT NOTE - PSYCHIATRIC ISSUES AND PLAN (INCLUDE STANDING AND PRN MEDICATION)
Zyprexa 10 mg BID and  mg BID restart back on Zyprexa at 10mg daily and VPA ER at 1000mg HS. continue with MVI 1 tab daily.  PRNs: haldol 5mg PO/IM + ativan 2mg PO/IM q6Hrs for psychotic agitation

## 2022-06-06 NOTE — ED BEHAVIORAL HEALTH ASSESSMENT NOTE - OTHER
bright pink makeup euphoric [facility] Reference #: [id number] - no controlled substances prescribed guarded, suspicious euphoric: "good" hyperverbal intense at times

## 2022-06-06 NOTE — ED BEHAVIORAL HEALTH ASSESSMENT NOTE - NSBHROSSYSTEMS_PSY_ALL_CORE
Pt currently denies experiencing any headaches, no dizziness, no blurring of vision; no sorethroat; no cough, no fever. no chest pains, no SOB, no palpitations, no abdominal pains, no nausea/ vomiting/ diarrhea, no dysuria, no hesitancy, no arthralgia/ no pruritus. denied any muscle/ joint pains

## 2022-06-06 NOTE — ED ADULT TRIAGE NOTE - CHIEF COMPLAINT QUOTE
Pt brought in by EMS from her car, pt denies hx. Pt hyperverbal in triage. Pt refusing to answer questions. Pt denies SI/HI.

## 2022-06-06 NOTE — BH CHART NOTE - NSEVENTNOTEFT_PSY_ALL_CORE
Patient arrived from ED in stable condition. Pt. was asleep 2/2 to PRN IM medication received in ED. Not responding to verbal prompts.  Pt. w/ Hx of breast CA and thyroid mass; in Tx at [facility]. No acute risk of suicide or self harm assessed based on ED evaluation. Will reassess tomorrow when patient is awake and willing to participate in interview.     Plan to continue home medications:   Depakote ER 1000mg, Zyprexa 10mg daily  MEDICATIONS  (PRN):  haloperidol     Tablet 5 milliGRAM(s) Oral every 6 hours PRN psychotic agitation  haloperidol    Injectable 5 milliGRAM(s) IntraMuscular Once PRN severe psychotic agitation  LORazepam     Tablet 2 milliGRAM(s) Oral every 6 hours PRN Agitation  LORazepam   Injectable 2 milliGRAM(s) IntraMuscular Once PRN severe agitation  nicotine  Polacrilex Gum 4 milliGRAM(s) Oral every 2 hours PRN smoking cessation

## 2022-06-06 NOTE — ED PROVIDER NOTE - CLINICAL SUMMARY MEDICAL DECISION MAKING FREE TEXT BOX
51 yr old with agitation, violent behavior with hx of bipolar but reportably new behavior,  will get labs, ct head, depakote level and reassess. Psych consult

## 2022-06-06 NOTE — ED PROVIDER NOTE - CARDIAC, MLM
Normal rate, regular rhythm.  Heart sounds S1, S2.  No murmurs, rubs or gallops. Was The Patient On Physician Recommended Anticoagulation Therapy?: Please Select the Appropriate Response

## 2022-06-06 NOTE — ED ADULT NURSE NOTE - NSIMPLEMENTINTERV_GEN_ALL_ED
Implemented All Universal Safety Interventions:  [place] to call system. Call bell, personal items and telephone within reach. Instruct patient to call for assistance. Room bathroom lighting operational. Non-slip footwear when patient is off stretcher. Physically safe environment: no spills, clutter or unnecessary equipment. Stretcher in lowest position, wheels locked, appropriate side rails in place.
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
## 2022-06-06 NOTE — ED PROVIDER NOTE - PROGRESS NOTE DETAILS
Labs and CT reviewed, ok for psych admission.  ECG had  , was done after sedation with haldol and ativan.

## 2022-06-06 NOTE — ED BEHAVIORAL HEALTH ASSESSMENT NOTE - SUMMARY
The patient is a 46 year old woman, domiciled alone versus possibly with mothers at times, psychiatric history significant for Schizoaffective disorder- bipolar type vs Bipolar 1 disorder- cj, multiple prior inpatient psychiatric admissions, recently discharged from [facility] on 6/26 for manic episode, no documented SI or HI, Cannabis abuse, medical hx of anemia per record, presented from outpatient [facility], after missing original appointment post discharge, for evaluation for cj in the face of medication non-compliance.    Patient currently hypomanic, on the verge of becoming fully manic, in the face of not taking her prescribed meds. She is speaking quickly, but is not pressured, can be redirected. She is sexually preoccupied and dressing in vibrant pink colors and makeup. Notably labile. Currently not hallucinating or delusional. Denying drugs/EtOH, no current or past SI/I/P. 51/F with hx of bipolar disorder; prior hx of poor compliance; with hx of psych admissions (including 4 at [facility]: 7/2017, 6/2017, 12/2015 and 7/2006); no documented hx of SA; and past hx of cannabis abuse.  Pertinent medical issues include: anemia, HTN, breast cA s/p surgery (with ongoing oncologic care ast [facility]); hx of nontoxic goiter s/p thyroid surgery.  Today, presented to the ED BIB EMS after a bystander called 911 as Pt was undressing in public.    at this time, is exhibiting disorganization in TP (tangentiality, FOI/ illogical/ impaired reasoning); is severely affectively dysregulated and currently, continues to be unpredictable; delusional; with poor insight and impaired judgement.  said symptoms likely precipitated in the context of noncompliance to meds. however, given hx of breast cA/ thyroid mass, will need to exclude that nothing organic (mets) is contributing towards propagation of Pt's current clinical presentation. If medical cause(s) is/are ruled out, to pursue in-Pt psych admission for stabilization and ensuring safety as Pt's current presentation has significantly shifted from her baseline of being "mildly hypomanic" to overt cj.      RECOMMENDATIONS:   1. REINITIATE VPA ER at 1000mg HS as primary mood stabilizer and Zyprexa at 10mg daily  as adjunct mood stabilizer/ primary antipsychotic. treatment team to titrate accordingly   2. PRNs: haldol 5mg PO/IM + ativan 2mg PO/IM q6Hrs for psychotic agitation   3. MVI 1 tab daily  4. onco care - c/o hospitalist evaluation/ reccs once Pt is admitted to IPU  5. facilitate transfer to IPU on involuntary status once medically cleared 51/F with hx of bipolar disorder; prior hx of poor compliance; with hx of psych admissions (including 4 at [facility]: 7/2017, 6/2017, 12/2015 and 7/2006); no documented hx of SA; and past hx of cannabis abuse.  Pertinent medical issues include: anemia, HTN, breast cA s/p surgery (with ongoing oncologic care at [facility]); hx of nontoxic goiter s/p thyroid surgery.  Today, presented to the ED BIB EMS after a bystander called 911 as Pt was undressing in public.    at this time, is exhibiting disorganization in TP (tangentiality, FOI/ illogical/ impaired reasoning); is severely affectively dysregulated and currently, continues to be unpredictable; delusional; with poor insight and impaired judgement.  said symptoms likely precipitated in the context of noncompliance to meds. however, given hx of breast cA/ thyroid mass, will need to exclude that nothing organic (mets) is contributing towards propagation of Pt's current clinical presentation. If medical cause(s) is/are ruled out, to pursue in-Pt psych admission for stabilization and ensuring safety as Pt's current presentation has significantly shifted from her baseline of being "mildly hypomanic" to overt cj; her overall functionality has been impaired.      RECOMMENDATIONS:   1. REINITIATE VPA ER at 1000mg HS as primary mood stabilizer and Zyprexa at 10mg daily  as adjunct mood stabilizer/ primary antipsychotic. treatment team to titrate accordingly   2. PRNs: haldol 5mg PO/IM + ativan 2mg PO/IM q6Hrs for psychotic agitation   3. MVI 1 tab daily  4. onco care - c/o hospitalist evaluation/ reccs once Pt is admitted to IPU  5. facilitate transfer to IPU on involuntary status once medically cleared 51/F with hx of bipolar disorder; prior hx of poor compliance; with hx of psych admissions (including 4 at [facility]: 7/2017, 6/2017, 12/2015 and 7/2006); no documented hx of SA; and past hx of cannabis abuse.  Pertinent medical issues include: anemia, HTN, breast cA s/p surgery (with ongoing oncologic care at [place]); hx of nontoxic goiter s/p thyroid surgery.  Today, presented to the ED BIB EMS after a bystander called 911 as Pt was undressing in public.    at this time, is exhibiting disorganization in TP (tangentiality, FOI/ illogical/ impaired reasoning); is severely affectively dysregulated and currently, continues to be unpredictable; delusional; with poor insight and impaired judgement.  said symptoms likely precipitated in the context of noncompliance to meds. however, given hx of breast cA/ thyroid mass, will need to exclude that nothing organic (mets) is contributing towards propagation of Pt's current clinical presentation. If medical cause(s) is/are ruled out, to pursue in-Pt psych admission for stabilization and ensuring safety as Pt's current presentation has significantly shifted from her baseline of being "mildly hypomanic" to overt cj; her overall functionality has been impaired.      RECOMMENDATIONS:   1. REINITIATE VPA ER at 1000mg HS as primary mood stabilizer and Zyprexa at 10mg daily  as adjunct mood stabilizer/ primary antipsychotic. treatment team to titrate accordingly   2. PRNs: haldol 5mg PO/IM + ativan 2mg PO/IM q6Hrs for psychotic agitation   3. MVI 1 tab daily  4. onco care - c/o hospitalist evaluation/ reccs once Pt is admitted to I[facility]  5. facilitate transfer to IPU on involuntary status once medically cleared  6. obtain collateral information   - attempted to call the following contact details listed per Pt's chart  Conrado Horner at [phone]: no answer; unable to leave   [phone] - went straight to ; left  encouraging call back  [phone] - unable to receive calls at this time  [phone] - not working number 51/F with hx of bipolar disorder; prior hx of poor compliance; with hx of psych admissions (including 4 at [facility]: 7/2017, 6/2017, 12/2015 and 7/2006); no documented hx of SA; and past hx of cannabis abuse.  Pertinent medical issues include: anemia, HTN, breast cA s/p surgery (with ongoing oncologic care at [place]); hx of nontoxic goiter s/p thyroid surgery.  Today, presented to the ED BIB EMS after a bystander called 911 as Pt was undressing in public.    at this time, is exhibiting disorganization in TP (tangentiality, FOI/ illogical/ impaired reasoning); is severely affectively dysregulated and currently, continues to be unpredictable; delusional; with poor insight and impaired judgement.  said symptoms likely precipitated in the context of noncompliance to meds VPA level < 3.15). however, given hx of breast cA/ thyroid mass, will need to exclude that nothing organic (mets) is contributing towards propagation of Pt's current clinical presentation. If medical cause(s) is/are ruled out, to pursue in-Pt psych admission for stabilization and ensuring safety as Pt's current presentation has significantly shifted from her baseline of being "mildly hypomanic" to overt cj; her overall functionality has been impaired.      RECOMMENDATIONS:   1. REINITIATE VPA ER at 1000mg HS as primary mood stabilizer and Zyprexa at 10mg daily  as adjunct mood stabilizer/ primary antipsychotic. treatment team to titrate accordingly   2. PRNs: haldol 5mg PO/IM + ativan 2mg PO/IM q6Hrs for psychotic agitation   3. MVI 1 tab daily  4. onco care - c/o hospitalist evaluation/ reccs once Pt is admitted to I[facility]  5. facilitate transfer to IPU on involuntary status once medically cleared  6. obtain collateral information   - attempted to call the following contact details listed per Pt's chart  Conrado Horner at [phone]: no answer; unable to leave   [phone] - went straight to ; left  encouraging call back  [phone] - unable to receive calls at this time  [phone] - not working number
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
## 2022-06-06 NOTE — ED PROVIDER NOTE - ENMT, MLM
Airway patent, Nasal mucosa clear. Mouth with normal mucosa. Throat has no vesicles, no oropharyngeal exudates and uvula is midline. Scar on neck
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
## 2022-06-06 NOTE — ED BEHAVIORAL HEALTH ASSESSMENT NOTE - OTHER PAST PSYCHIATRIC HISTORY (INCLUDE DETAILS REGARDING ONSET, COURSE OF ILLNESS, INPATIENT/OUTPATIENT TREATMENT)
Patient has an extensive psychiatric history significant for Schizoaffective disorder- bipolar type vs Bipolar 1 disorder- cj, multiple prior inpatient psychiatric admissions, recently discharged from [facility] at the end of [facility] extensive psychiatric hx significant for Bipolar disorder vs Schizoaffective disorder- bipolar type   multiple prior inpatient psychiatric admissions ((including 4 at [facility]: 7/2017, 6/2017, 12/2015 and 7/2006)  on follow up with Dr JAMIE Yanez, last evaluated 4/25/2022  been attending our [facility] since 7/20/2017

## 2022-06-07 PROCEDURE — 99222 1ST HOSP IP/OBS MODERATE 55: CPT

## 2022-06-07 RX ORDER — HALOPERIDOL DECANOATE 100 MG/ML
5 INJECTION INTRAMUSCULAR ONCE
Refills: 0 | Status: COMPLETED | OUTPATIENT
Start: 2022-06-07 | End: 2022-06-07

## 2022-06-07 RX ORDER — POTASSIUM CHLORIDE 20 MEQ
40 PACKET (EA) ORAL ONCE
Refills: 0 | Status: COMPLETED | OUTPATIENT
Start: 2022-06-07 | End: 2022-06-07

## 2022-06-07 RX ADMIN — Medication 2 MILLIGRAM(S): at 20:46

## 2022-06-07 RX ADMIN — HALOPERIDOL DECANOATE 5 MILLIGRAM(S): 100 INJECTION INTRAMUSCULAR at 20:46

## 2022-06-07 RX ADMIN — OLANZAPINE 10 MILLIGRAM(S): 15 TABLET, FILM COATED ORAL at 08:10

## 2022-06-07 RX ADMIN — HALOPERIDOL DECANOATE 5 MILLIGRAM(S): 100 INJECTION INTRAMUSCULAR at 22:45

## 2022-06-07 RX ADMIN — Medication 2 MILLIGRAM(S): at 22:45

## 2022-06-07 RX ADMIN — DIVALPROEX SODIUM 1000 MILLIGRAM(S): 500 TABLET, DELAYED RELEASE ORAL at 20:45

## 2022-06-07 RX ADMIN — Medication 1 TABLET(S): at 08:10

## 2022-06-07 NOTE — BH INPATIENT PSYCHIATRY ASSESSMENT NOTE - OTHER
unsteady at times disorganized, bizarre  cooperation limited by mental state patient's eyes are closed during interview

## 2022-06-07 NOTE — BH INPATIENT PSYCHIATRY ASSESSMENT NOTE - NSBHMETABOLIC_PSY_ALL_CORE_FT
BMI: BMI (kg/m2): 25.8 (06-06-22 @ 13:59)  HbA1c: A1C with Estimated Average Glucose Result: 5.2 % (06-06-22 @ 17:20)    Glucose:   BP: 114/82 (06-06-22 @ 12:06) (114/82 - 138/85)  Lipid Panel:

## 2022-06-07 NOTE — BH INPATIENT PSYCHIATRY ASSESSMENT NOTE - CURRENT MEDICATION
MEDICATIONS  (STANDING):  diVALproex ER 1000 milliGRAM(s) Oral at bedtime  multivitamin 1 Tablet(s) Oral daily  OLANZapine 10 milliGRAM(s) Oral daily  potassium chloride    Tablet ER 40 milliEquivalent(s) Oral once    MEDICATIONS  (PRN):  haloperidol     Tablet 5 milliGRAM(s) Oral every 6 hours PRN psychotic agitation  haloperidol    Injectable 5 milliGRAM(s) IntraMuscular Once PRN severe psychotic agitation  LORazepam     Tablet 2 milliGRAM(s) Oral every 6 hours PRN Agitation  LORazepam   Injectable 2 milliGRAM(s) IntraMuscular Once PRN severe agitation  nicotine  Polacrilex Gum 4 milliGRAM(s) Oral every 2 hours PRN smoking cessation   54 year old male with OA and torn meniscus right knee c/o right knee pain and knee gives way; pt had PT without good results he presents to PST for planned right knee arthroscopy MEDICATIONS  (STANDING):  diVALproex ER 1000 milliGRAM(s) Oral at bedtime  multivitamin 1 Tablet(s) Oral daily  potassium chloride   Powder 40 milliEquivalent(s) Oral once    MEDICATIONS  (PRN):  haloperidol     Tablet 5 milliGRAM(s) Oral every 6 hours PRN psychotic agitation  haloperidol    Injectable 5 milliGRAM(s) IntraMuscular Once PRN severe psychotic agitation  LORazepam     Tablet 2 milliGRAM(s) Oral every 6 hours PRN Agitation  LORazepam   Injectable 2 milliGRAM(s) IntraMuscular Once PRN severe agitation  nicotine  Polacrilex Gum 4 milliGRAM(s) Oral every 2 hours PRN smoking cessation   54 year old male with OA and torn meniscus right knee c/o right knee pain and knee gives way; pt had PT without good results he presents to PST for planned right knee arthroscopy     Plan:  1. PST instructions given ; NPO status instructions to be given by ASU   2. Pt instructed to take following meds with sip of water :   3. Pt instructed to take routine evening medications unless indicated   4. Stop NSAIDS ( Aspirin Alev Motrin Mobic Diclofenac), herbal supplements , MVI , Vitamin fish oil 7 days prior to surgery  unless   directed by surgeon or cardiologist;   5. Medical Optimization  with Dr Bello   6. EZ wash instructions given   7. Labs EKG  as per surgeon request   8. Pt instructed to self quarantine   9. Covid Testing scheduled Pt notified and aware  10. Pt denies covid symptoms shortness of breath fever cough 54 year old male with OA and torn meniscus right knee c/o right knee pain and knee gives way; pt had PT without good results he presents to PST for planned right knee arthroscopy     Plan:  1. PST instructions given ; NPO status instructions to be given by ASU   2. Pt instructed to take following meds with sip of water :   3. Pt instructed to take routine evening medications unless indicated   4. Stop NSAIDS ( Aspirin Alev Motrin Mobic Diclofenac), herbal supplements , MVI , Vitamin fish oil 7 days prior to surgery  unless   directed by surgeon or cardiologist;   5. Medical Optimization  with Dr Bello   6. EZ wash instructions given   7. Pt /96 denies headache blurry vision; instructed pt to go to ER if develop symptoms   7. Labs EKG  as per surgeon request   8. Pt instructed to self quarantine   9. Covid Testing scheduled Pt notified and aware  10. Pt denies covid symptoms shortness of breath fever cough

## 2022-06-07 NOTE — BH INPATIENT PSYCHIATRY ASSESSMENT NOTE - DESCRIPTION
per chart review: she lives alone; previously possibly at time, was living with her mother.  currently, unemployed.  is on SSI.

## 2022-06-07 NOTE — BH INPATIENT PSYCHIATRY ASSESSMENT NOTE - LEGAL HISTORY
remote hx of arrest at age 21 for something involving drugs. per [facility] [employer]/ [employer] site: no pending legal cases

## 2022-06-07 NOTE — BH INPATIENT PSYCHIATRY ASSESSMENT NOTE - OTHER PAST PSYCHIATRIC HISTORY (INCLUDE DETAILS REGARDING ONSET, COURSE OF ILLNESS, INPATIENT/OUTPATIENT TREATMENT)
extensive psychiatric hx significant for Bipolar disorder vs Schizoaffective disorder- bipolar type   multiple prior inpatient psychiatric admissions ((including 4 at [facility]: 7/2017, 6/2017, 12/2015 and 7/2006)  on follow up with Dr JAMIE Yanez, last evaluated 4/25/2022  been attending our [facility] since 7/20/2017
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
## 2022-06-07 NOTE — BH INPATIENT PSYCHIATRY ASSESSMENT NOTE - RISK ASSESSMENT
RISK FACTORS:  Modifiable risk factors: cj    Unmodifiable risk factors: hx of mood disorder and psychosis, with past hx of poor compliance to meds, with past hx of multiple psych hosps, today there was aggression towards ED staff, past hx of co-morbid substance use    Protective factors: currently denies (and no objective evidence of) suicidality, no hx of SA or any self injurious behaviors,  Domiciled, no access to lethal means like guns,  no hx of pending legal cases    At this time given all risks taken into consideration, the Pt is Not at imminent risk for self harm/ suicide but does remain at chronically elevated risk of harming self.  Pt's current presentation is not be deemed dischargeable back to the community.  Current increased in risks can be mitigated by a psychiatric admission with supervision, continuing psych meds with titration towards efficacious dosing range

## 2022-06-07 NOTE — BH INPATIENT PSYCHIATRY ASSESSMENT NOTE - NSTXDCOTHRGOAL_PSY_ALL_CORE
Pt will present with a decrease in psychosis and disorganization as evident by an overall improvement in functioning x 7 days.

## 2022-06-07 NOTE — BH INPATIENT PSYCHIATRY ASSESSMENT NOTE - NSBHCHARTREVIEWVS_PSY_A_CORE FT
Vital Signs Last 24 Hrs  T(C): 36.1 (06-07-22 @ 14:43), Max: 36.3 (06-06-22 @ 22:40)  T(F): 97 (06-07-22 @ 14:43), Max: 97.4 (06-06-22 @ 22:40)  HR: --  BP: --  BP(mean): --  RR: --  SpO2: --    Orthostatic VS  06-07-22 @ 07:41  Lying BP: --/-- HR: --  Sitting BP: 108/85 HR: 83  Standing BP: 118/67 HR: 87  Site: upper left arm  Mode: electronic  Orthostatic VS  06-06-22 @ 13:59  Lying BP: --/-- HR: --  Sitting BP: 111/61 HR: 75  Standing BP: 106/63 HR: 78  Site: --  Mode: --
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
## 2022-06-08 LAB
ALBUMIN SERPL ELPH-MCNC: 4.6 G/DL — SIGNIFICANT CHANGE UP (ref 3.3–5)
ALP SERPL-CCNC: 77 U/L — SIGNIFICANT CHANGE UP (ref 40–120)
ALT FLD-CCNC: 40 U/L — HIGH (ref 4–33)
AMMONIA BLD-MCNC: 20 UMOL/L — SIGNIFICANT CHANGE UP (ref 11–55)
ANION GAP SERPL CALC-SCNC: 12 MMOL/L — SIGNIFICANT CHANGE UP (ref 7–14)
APPEARANCE UR: ABNORMAL
AST SERPL-CCNC: 44 U/L — HIGH (ref 4–32)
BACTERIA # UR AUTO: ABNORMAL
BASOPHILS # BLD AUTO: 0.02 K/UL — SIGNIFICANT CHANGE UP (ref 0–0.2)
BASOPHILS NFR BLD AUTO: 0.4 % — SIGNIFICANT CHANGE UP (ref 0–2)
BILIRUB SERPL-MCNC: 0.2 MG/DL — SIGNIFICANT CHANGE UP (ref 0.2–1.2)
BILIRUB UR-MCNC: NEGATIVE — SIGNIFICANT CHANGE UP
BUN SERPL-MCNC: 12 MG/DL — SIGNIFICANT CHANGE UP (ref 7–23)
CALCIUM SERPL-MCNC: 9.8 MG/DL — SIGNIFICANT CHANGE UP (ref 8.4–10.5)
CHLORIDE SERPL-SCNC: 103 MMOL/L — SIGNIFICANT CHANGE UP (ref 98–107)
CHOLEST SERPL-MCNC: 206 MG/DL — HIGH
CO2 SERPL-SCNC: 26 MMOL/L — SIGNIFICANT CHANGE UP (ref 22–31)
COLOR SPEC: YELLOW — SIGNIFICANT CHANGE UP
CREAT SERPL-MCNC: 0.73 MG/DL — SIGNIFICANT CHANGE UP (ref 0.5–1.3)
DIFF PNL FLD: NEGATIVE — SIGNIFICANT CHANGE UP
EGFR: 101 ML/MIN/1.73M2 — SIGNIFICANT CHANGE UP
EOSINOPHIL # BLD AUTO: 0.22 K/UL — SIGNIFICANT CHANGE UP (ref 0–0.5)
EOSINOPHIL NFR BLD AUTO: 4.1 % — SIGNIFICANT CHANGE UP (ref 0–6)
EPI CELLS # UR: 15 /HPF — HIGH (ref 0–5)
FOLATE SERPL-MCNC: 20 NG/ML — HIGH (ref 3.1–17.5)
GLUCOSE SERPL-MCNC: 105 MG/DL — HIGH (ref 70–99)
GLUCOSE UR QL: NEGATIVE — SIGNIFICANT CHANGE UP
HCT VFR BLD CALC: 35.2 % — SIGNIFICANT CHANGE UP (ref 34.5–45)
HDLC SERPL-MCNC: 47 MG/DL — LOW
HGB BLD-MCNC: 11 G/DL — LOW (ref 11.5–15.5)
HYALINE CASTS # UR AUTO: 3 /LPF — SIGNIFICANT CHANGE UP (ref 0–7)
IANC: 2.72 K/UL — SIGNIFICANT CHANGE UP (ref 1.8–7.4)
IMM GRANULOCYTES NFR BLD AUTO: 0.4 % — SIGNIFICANT CHANGE UP (ref 0–1.5)
KETONES UR-MCNC: ABNORMAL
LEUKOCYTE ESTERASE UR-ACNC: ABNORMAL
LIPID PNL WITH DIRECT LDL SERPL: 143 MG/DL — HIGH
LYMPHOCYTES # BLD AUTO: 1.93 K/UL — SIGNIFICANT CHANGE UP (ref 1–3.3)
LYMPHOCYTES # BLD AUTO: 36 % — SIGNIFICANT CHANGE UP (ref 13–44)
MCHC RBC-ENTMCNC: 30 PG — SIGNIFICANT CHANGE UP (ref 27–34)
MCHC RBC-ENTMCNC: 31.3 GM/DL — LOW (ref 32–36)
MCV RBC AUTO: 95.9 FL — SIGNIFICANT CHANGE UP (ref 80–100)
MONOCYTES # BLD AUTO: 0.45 K/UL — SIGNIFICANT CHANGE UP (ref 0–0.9)
MONOCYTES NFR BLD AUTO: 8.4 % — SIGNIFICANT CHANGE UP (ref 2–14)
NEUTROPHILS # BLD AUTO: 2.72 K/UL — SIGNIFICANT CHANGE UP (ref 1.8–7.4)
NEUTROPHILS NFR BLD AUTO: 50.7 % — SIGNIFICANT CHANGE UP (ref 43–77)
NITRITE UR-MCNC: NEGATIVE — SIGNIFICANT CHANGE UP
NON HDL CHOLESTEROL: 159 MG/DL — HIGH
NRBC # BLD: 0 /100 WBCS — SIGNIFICANT CHANGE UP
NRBC # FLD: 0 K/UL — SIGNIFICANT CHANGE UP
PH UR: 7 — SIGNIFICANT CHANGE UP (ref 5–8)
PLATELET # BLD AUTO: 311 K/UL — SIGNIFICANT CHANGE UP (ref 150–400)
POTASSIUM SERPL-MCNC: 3.9 MMOL/L — SIGNIFICANT CHANGE UP (ref 3.5–5.3)
POTASSIUM SERPL-SCNC: 3.9 MMOL/L — SIGNIFICANT CHANGE UP (ref 3.5–5.3)
PROT SERPL-MCNC: 7.7 G/DL — SIGNIFICANT CHANGE UP (ref 6–8.3)
PROT UR-MCNC: ABNORMAL
RBC # BLD: 3.67 M/UL — LOW (ref 3.8–5.2)
RBC # FLD: 13.9 % — SIGNIFICANT CHANGE UP (ref 10.3–14.5)
RBC CASTS # UR COMP ASSIST: 4 /HPF — SIGNIFICANT CHANGE UP (ref 0–4)
SODIUM SERPL-SCNC: 141 MMOL/L — SIGNIFICANT CHANGE UP (ref 135–145)
SP GR SPEC: 1.02 — SIGNIFICANT CHANGE UP (ref 1–1.05)
T3 SERPL-MCNC: 135 NG/DL — SIGNIFICANT CHANGE UP (ref 80–200)
T4 FREE SERPL-MCNC: 1.5 NG/DL — SIGNIFICANT CHANGE UP (ref 0.9–1.8)
TRIGL SERPL-MCNC: 80 MG/DL — SIGNIFICANT CHANGE UP
TSH SERPL-MCNC: 2.68 UIU/ML — SIGNIFICANT CHANGE UP (ref 0.27–4.2)
UROBILINOGEN FLD QL: SIGNIFICANT CHANGE UP
VALPROATE SERPL-MCNC: 80.3 UG/ML — SIGNIFICANT CHANGE UP (ref 50–100)
VIT B12 SERPL-MCNC: 815 PG/ML — SIGNIFICANT CHANGE UP (ref 200–900)
WBC # BLD: 5.36 K/UL — SIGNIFICANT CHANGE UP (ref 3.8–10.5)
WBC # FLD AUTO: 5.36 K/UL — SIGNIFICANT CHANGE UP (ref 3.8–10.5)
WBC UR QL: 5 /HPF — SIGNIFICANT CHANGE UP (ref 0–5)

## 2022-06-08 PROCEDURE — 99232 SBSQ HOSP IP/OBS MODERATE 35: CPT

## 2022-06-08 RX ADMIN — Medication 2 MILLIGRAM(S): at 10:59

## 2022-06-08 RX ADMIN — Medication 1 MILLIGRAM(S): at 20:40

## 2022-06-08 NOTE — BH INPATIENT PSYCHIATRY PROGRESS NOTE - PRN MEDS
MEDICATIONS  (PRN):  haloperidol     Tablet 5 milliGRAM(s) Oral every 6 hours PRN psychotic agitation  haloperidol    Injectable 5 milliGRAM(s) IntraMuscular Once PRN severe psychotic agitation  LORazepam     Tablet 2 milliGRAM(s) Oral every 6 hours PRN Agitation  LORazepam   Injectable 2 milliGRAM(s) IntraMuscular Once PRN severe agitation  nicotine  Polacrilex Gum 4 milliGRAM(s) Oral every 2 hours PRN smoking cessation   MEDICATIONS  (PRN):  LORazepam     Tablet 2 milliGRAM(s) Oral every 6 hours PRN Agitation  LORazepam   Injectable 2 milliGRAM(s) IntraMuscular Once PRN severe agitation  nicotine  Polacrilex Gum 4 milliGRAM(s) Oral every 2 hours PRN smoking cessation

## 2022-06-08 NOTE — BH INPATIENT PSYCHIATRY PROGRESS NOTE - CURRENT MEDICATION
MEDICATIONS  (STANDING):  diVALproex ER 1000 milliGRAM(s) Oral at bedtime  multivitamin 1 Tablet(s) Oral daily    MEDICATIONS  (PRN):  haloperidol     Tablet 5 milliGRAM(s) Oral every 6 hours PRN psychotic agitation  haloperidol    Injectable 5 milliGRAM(s) IntraMuscular Once PRN severe psychotic agitation  LORazepam     Tablet 2 milliGRAM(s) Oral every 6 hours PRN Agitation  LORazepam   Injectable 2 milliGRAM(s) IntraMuscular Once PRN severe agitation  nicotine  Polacrilex Gum 4 milliGRAM(s) Oral every 2 hours PRN smoking cessation   MEDICATIONS  (STANDING):  diVALproex ER 1000 milliGRAM(s) Oral at bedtime  LORazepam     Tablet 1 milliGRAM(s) Oral three times a day  multivitamin 1 Tablet(s) Oral daily    MEDICATIONS  (PRN):  LORazepam     Tablet 2 milliGRAM(s) Oral every 6 hours PRN Agitation  LORazepam   Injectable 2 milliGRAM(s) IntraMuscular Once PRN severe agitation  nicotine  Polacrilex Gum 4 milliGRAM(s) Oral every 2 hours PRN smoking cessation

## 2022-06-08 NOTE — BH INPATIENT PSYCHIATRY PROGRESS NOTE - NSBHCHARTREVIEWVS_PSY_A_CORE FT
Vital Signs Last 24 Hrs  T(C): 35.3 (06-08-22 @ 06:50), Max: 36.7 (06-07-22 @ 22:13)  T(F): 95.6 (06-08-22 @ 06:50), Max: 98 (06-07-22 @ 22:13)  HR: 74 (06-08-22 @ 06:50) (74 - 74)  BP: 117/62 (06-08-22 @ 06:50) (117/62 - 117/62)  BP(mean): --  RR: --  SpO2: --    Orthostatic VS  06-07-22 @ 07:41  Lying BP: --/-- HR: --  Sitting BP: 108/85 HR: 83  Standing BP: 118/67 HR: 87  Site: upper left arm  Mode: electronic  Orthostatic VS  06-06-22 @ 13:59  Lying BP: --/-- HR: --  Sitting BP: 111/61 HR: 75  Standing BP: 106/63 HR: 78  Site: --  Mode: --   Vital Signs Last 24 Hrs  T(C): 35.3 (06-08-22 @ 06:50), Max: 36.7 (06-07-22 @ 22:13)  T(F): 95.6 (06-08-22 @ 06:50), Max: 98 (06-07-22 @ 22:13)  HR: 74 (06-08-22 @ 06:50) (74 - 74)  BP: 117/62 (06-08-22 @ 06:50) (117/62 - 117/62)  BP(mean): --  RR: --  SpO2: --    Orthostatic VS  06-07-22 @ 07:41  Lying BP: --/-- HR: --  Sitting BP: 108/85 HR: 83  Standing BP: 118/67 HR: 87  Site: upper left arm  Mode: electronic
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
## 2022-06-08 NOTE — BH INPATIENT PSYCHIATRY PROGRESS NOTE - NSBHFUPINTERVALHXFT_PSY_A_CORE
Follow-up for cj, disorganization, disorientation. Chart reviewed and discussed with team. The pt. received PO PRN medications for agitation last night with no improvement. She then received IM Haldol and Ativan with little effect. She was not able to sleep. This morning, the pt. remains disorganized, is wandering aimlessly, is disoriented and RIS. She received PO Ativan for agitation and suspected excitatory catatonia and was able to sleep. Will reassess patient in the afternoon and start standing Ativan.

## 2022-06-09 LAB
CULTURE RESULTS: SIGNIFICANT CHANGE UP
SPECIMEN SOURCE: SIGNIFICANT CHANGE UP

## 2022-06-09 PROCEDURE — 99232 SBSQ HOSP IP/OBS MODERATE 35: CPT

## 2022-06-09 RX ORDER — DIVALPROEX SODIUM 500 MG/1
1000 TABLET, DELAYED RELEASE ORAL AT BEDTIME
Refills: 0 | Status: DISCONTINUED | OUTPATIENT
Start: 2022-06-09 | End: 2022-06-28

## 2022-06-09 RX ORDER — DIVALPROEX SODIUM 500 MG/1
1000 TABLET, DELAYED RELEASE ORAL AT BEDTIME
Refills: 0 | Status: DISCONTINUED | OUTPATIENT
Start: 2022-06-09 | End: 2022-06-09

## 2022-06-09 RX ADMIN — Medication 1 MILLIGRAM(S): at 21:15

## 2022-06-09 RX ADMIN — Medication 1 MILLIGRAM(S): at 08:23

## 2022-06-09 RX ADMIN — DIVALPROEX SODIUM 1000 MILLIGRAM(S): 500 TABLET, DELAYED RELEASE ORAL at 21:15

## 2022-06-09 RX ADMIN — Medication 2 MILLIGRAM(S): at 22:42

## 2022-06-09 RX ADMIN — Medication 1 MILLIGRAM(S): at 12:59

## 2022-06-09 RX ADMIN — Medication 1 TABLET(S): at 08:23

## 2022-06-09 NOTE — BH INPATIENT PSYCHIATRY PROGRESS NOTE - NSBHCHARTREVIEWVS_PSY_A_CORE FT
Vital Signs Last 24 Hrs  T(C): 36.7 (06-09-22 @ 14:48), Max: 36.7 (06-09-22 @ 14:48)  T(F): 98.1 (06-09-22 @ 14:48), Max: 98.1 (06-09-22 @ 14:48)  HR: --  BP: --  BP(mean): --  RR: --  SpO2: --    Orthostatic VS  06-09-22 @ 05:43  Lying BP: --/-- HR: --  Sitting BP: 116/81 HR: 78  Standing BP: 127/89 HR: 91  Site: --  Mode: --  Orthostatic VS  06-08-22 @ 19:23  Lying BP: --/-- HR: --  Sitting BP: 106/70 HR: 86  Standing BP: 108/74 HR: 74  Site: --  Mode: --

## 2022-06-09 NOTE — BH INPATIENT PSYCHIATRY PROGRESS NOTE - CURRENT MEDICATION
MEDICATIONS  (STANDING):  diVALproex ER 1000 milliGRAM(s) Oral at bedtime  LORazepam     Tablet 1 milliGRAM(s) Oral three times a day  multivitamin 1 Tablet(s) Oral daily    MEDICATIONS  (PRN):  LORazepam     Tablet 2 milliGRAM(s) Oral every 6 hours PRN Agitation  LORazepam   Injectable 2 milliGRAM(s) IntraMuscular Once PRN severe agitation  nicotine  Polacrilex Gum 4 milliGRAM(s) Oral every 2 hours PRN smoking cessation

## 2022-06-09 NOTE — BH INPATIENT PSYCHIATRY PROGRESS NOTE - NSBHFUPINTERVALHXFT_PSY_A_CORE
Follow-up for cj, disorganization, disorientation. Chart reviewed and discussed with team. The pt. slept well, reports no changes to appetite. This morning is she remains grossly disorganized and illogical, however, is more re-directable with minimally improved reasoning. The pt. stated she is at "[facility]. She was disoriented to date but recalls month and year. She was able to associate calling her mother on the unit phone because she is allowed to have her cell phone. The pt. denied A/H, V/H, S/i/p, H/i/p and delusions although delusions and RIS assessed. Labs drawn yesterday to assess for delirium. U/A was positive for leukocytes and nitrites. Medicine consulted who advised to wait for urine culture to result as the pt. denies urinary symptoms. CBC w/ baseline abnormalities, CMP reveals transaminitis but mild; will continue to monitor. Ammonia and thyroid function are WNL. VPA level is 80.30.

## 2022-06-09 NOTE — BH INPATIENT PSYCHIATRY PROGRESS NOTE - PRN MEDS
MEDICATIONS  (PRN):  LORazepam     Tablet 2 milliGRAM(s) Oral every 6 hours PRN Agitation  LORazepam   Injectable 2 milliGRAM(s) IntraMuscular Once PRN severe agitation  nicotine  Polacrilex Gum 4 milliGRAM(s) Oral every 2 hours PRN smoking cessation

## 2022-06-09 NOTE — BH INPATIENT PSYCHIATRY PROGRESS NOTE - OTHER
disorganized, bizarre  cooperation limited by mental state patient's eyes are closed during interview unsteady at times

## 2022-06-09 NOTE — BH INPATIENT PSYCHIATRY PROGRESS NOTE - NSBHASSESSSUMMFT_PSY_ALL_CORE
51 yr old female, single, domiciled and unemployed.  currently, [facility]. other diagnoses as per Psyckes include: SAD and unspecified bipolar disorder.  hx of poor compliance to meds and psych aftercare.  Has had prior psych admissions; as per Psyckes: no other recent psych admissions.  Per chart review, there has been no documented hx of SA; has hx of cannabis abuse.  Pertinent medical issues include: anemia, HTN, breast cA s/p surgery (with ongoing oncologic care at [place]); hx of nontoxic goiter s/p thyroid surgery.  Today, presented to the ED BIB EMS after a bystander called 911 as Pt was undressing in public.    Pt. demonstrates slight improvement in behavior and TP.  She remains grossly disorganized, disoriented, RIS w/ PMA. Will continue to hold antipsychotics and continue Ativan 1mg TID for suspected catatonia. Will titrate Zyprexa slowly once catatonia resolves. WIll restart Depakote ER as ammonia is WNL. Monitor CMP; if urine culture positive start ABX. Medicine advised to hold ABX in spite of positive leukocytes and nitrites on U/A given pt. denies urinary Sx.     Plan:  Psychiatry: HOLD Zyprexa 10mg and PRN Haldol  	Depakote ER 1000mg HS  	Ativan 1mg TID for suspected excitatory catatonia  	MEDICATIONS  (PRN):  LORazepam     Tablet 2 milliGRAM(s) Oral every 6 hours PRN Agitation  LORazepam   Injectable 2 milliGRAM(s) IntraMuscular Once PRN severe agitation  nicotine  Polacrilex Gum 4 milliGRAM(s) Oral every 2 hours PRN smoking cessation  Observations: routine  Legal status: 9.39 emergency  Medical: Hx of breast CA, thyroid mass: Tx at [facility]  	Hypokalemia: resolved  Dispo:pending

## 2022-06-10 PROCEDURE — 99232 SBSQ HOSP IP/OBS MODERATE 35: CPT

## 2022-06-10 RX ORDER — QUETIAPINE FUMARATE 200 MG/1
50 TABLET, FILM COATED ORAL EVERY 6 HOURS
Refills: 0 | Status: DISCONTINUED | OUTPATIENT
Start: 2022-06-10 | End: 2022-06-10

## 2022-06-10 RX ORDER — LANOLIN ALCOHOL/MO/W.PET/CERES
5 CREAM (GRAM) TOPICAL AT BEDTIME
Refills: 0 | Status: DISCONTINUED | OUTPATIENT
Start: 2022-06-10 | End: 2022-06-28

## 2022-06-10 RX ORDER — QUETIAPINE FUMARATE 200 MG/1
25 TABLET, FILM COATED ORAL ONCE
Refills: 0 | Status: COMPLETED | OUTPATIENT
Start: 2022-06-10 | End: 2022-06-10

## 2022-06-10 RX ORDER — QUETIAPINE FUMARATE 200 MG/1
25 TABLET, FILM COATED ORAL EVERY 6 HOURS
Refills: 0 | Status: DISCONTINUED | OUTPATIENT
Start: 2022-06-10 | End: 2022-06-16

## 2022-06-10 RX ORDER — OLANZAPINE 15 MG/1
2.5 TABLET, FILM COATED ORAL ONCE
Refills: 0 | Status: DISCONTINUED | OUTPATIENT
Start: 2022-06-10 | End: 2022-06-16

## 2022-06-10 RX ORDER — QUETIAPINE FUMARATE 200 MG/1
50 TABLET, FILM COATED ORAL AT BEDTIME
Refills: 0 | Status: DISCONTINUED | OUTPATIENT
Start: 2022-06-10 | End: 2022-06-16

## 2022-06-10 RX ORDER — OLANZAPINE 15 MG/1
5 TABLET, FILM COATED ORAL ONCE
Refills: 0 | Status: DISCONTINUED | OUTPATIENT
Start: 2022-06-10 | End: 2022-06-10

## 2022-06-10 RX ADMIN — QUETIAPINE FUMARATE 25 MILLIGRAM(S): 200 TABLET, FILM COATED ORAL at 12:30

## 2022-06-10 RX ADMIN — Medication 2 MILLIGRAM(S): at 12:30

## 2022-06-10 RX ADMIN — Medication 1 TABLET(S): at 15:55

## 2022-06-10 RX ADMIN — QUETIAPINE FUMARATE 25 MILLIGRAM(S): 200 TABLET, FILM COATED ORAL at 16:59

## 2022-06-10 RX ADMIN — Medication 2 MILLIGRAM(S): at 16:59

## 2022-06-10 RX ADMIN — Medication 2 MILLIGRAM(S): at 08:55

## 2022-06-10 RX ADMIN — Medication 5 MILLIGRAM(S): at 00:59

## 2022-06-10 NOTE — BH INPATIENT PSYCHIATRY PROGRESS NOTE - PRN MEDS
MEDICATIONS  (PRN):  LORazepam     Tablet 2 milliGRAM(s) Oral every 6 hours PRN Agitation  LORazepam   Injectable 2 milliGRAM(s) IntraMuscular Once PRN severe agitation  melatonin. 5 milliGRAM(s) Oral at bedtime PRN Insomnia  nicotine  Polacrilex Gum 4 milliGRAM(s) Oral every 2 hours PRN smoking cessation  OLANZapine Injectable 2.5 milliGRAM(s) IntraMuscular once PRN Severe agitation  QUEtiapine 25 milliGRAM(s) Oral every 6 hours PRN agitation

## 2022-06-10 NOTE — BH INPATIENT PSYCHIATRY PROGRESS NOTE - NSBHFUPINTERVALHXFT_PSY_A_CORE
Follow-up for cj, disorganization, disorientation. Chart reviewed and discussed with team. Pt. attempted to elope yesterday by climbing [id number] fence. She is now on elopement precautions and in gowns. Fresh air breaks restricted. Medicine consulted this morning for positive U/A and urine culture. Culture sample was possibly contaminated, however, the pt. is altered and is a poor historian when asked to deny or confirm urinary Sx. Bactrim initiated empirically for UTI w/ AMS. The pt. was given IM Ativan this morning and required manual hold to be brought to her room. She was able to sleep but remains disorganized upon awakening. PO Seroquel standing and PRN for agitation started; pt. was able to sleep. Excitatory catatonia vs. UTI w/ AMS suspected. Vital signs are stable.

## 2022-06-10 NOTE — BH INPATIENT PSYCHIATRY PROGRESS NOTE - NSBHASSESSSUMMFT_PSY_ALL_CORE
51 yr old female, single, domiciled and unemployed.  currently, in treatment at [facility] for bipolar disorder. other diagnoses as per Psyckes include: SAD and unspecified bipolar disorder.  hx of poor compliance to meds and psych aftercare.  Has had prior psych admissions; as per Psyckes: no other recent psych admissions.  Per chart review, there has been no documented hx of SA; has hx of cannabis abuse.  Pertinent medical issues include: anemia, HTN, breast cA s/p surgery (with ongoing oncologic care at [place]); hx of nontoxic goiter s/p thyroid surgery.  Today, presented to the ED BIB EMS after a bystander called 911 as Pt was undressing in public.    No change in pt. status. She remains  disorganized, disoriented, RIS w/ PMA. U/A and culture are positive, although possible the sample was contaminated. Due to pt. AMS, Bactrim started empirically. Pt. is still being treated w/ Ativan for possible excitatory catatonia, titrated 1.5mg TID. Since improvement has been limited and pt. has poor sleep, Seroquel 50mg HS started for sleep and is PRN for agitation. Nursing advised to use sparingly.   If pt. improves w/ Tx of Bactrim, can taper Ativan standing to 1mg TID.     Plan:  Psychiatry: Seroquel 50mg HS for insomnia  	Depakote ER 1000mg HS  	Ativan 1.5mg TID for suspected excitatory catatonia  	MEDICATIONS  (PRN):  LORazepam     Tablet 2 milliGRAM(s) Oral every 6 hours PRN Agitation  LORazepam   Injectable 2 milliGRAM(s) IntraMuscular Once PRN severe agitation  nicotine  Polacrilex Gum 4 milliGRAM(s) Oral every 2 hours PRN smoking cessation  Observations: routine  Legal status: 9.39 emergency  Medical: Hx of breast CA, thyroid mass: Tx at [facility]  	Hypokalemia: resolved  	UTI: Bactrim 160mg/800mg BID x5 days  Dispo:pending   51 yr old female, single, domiciled and unemployed.  currently, in treatment at [facility] for bipolar disorder. other diagnoses as per Psyckes include: SAD and unspecified bipolar disorder.  hx of poor compliance to meds and psych aftercare.  Has had prior psych admissions; as per Psyckes: no other recent psych admissions.  Per chart review, there has been no documented hx of SA; has hx of cannabis abuse.  Pertinent medical issues include: anemia, HTN, breast cA s/p surgery (with ongoing oncologic care at [place]); hx of nontoxic goiter s/p thyroid surgery.  Today, presented to the ED BIB EMS after a bystander called 911 as Pt was undressing in public.    No change in pt. status. She remains  disorganized, disoriented, RIS w/ PMA. U/A and culture are positive, although possible the sample was contaminated. Due to pt. AMS, Bactrim started empirically. Pt. is still being treated w/ Ativan for possible excitatory catatonia, titrated 1.5mg TID. Since improvement has been limited and pt. has poor sleep, Seroquel 50mg HS started for sleep and is PRN for agitation. Nursing advised to use sparingly. Avoiding use of Haldol if catatonic.  If pt. improves w/ Tx of Bactrim, can taper Ativan standing to 1mg TID.     Plan:  Psychiatry: Seroquel 50mg HS for insomnia  	Depakote ER 1000mg HS  	Ativan 1.5mg TID for suspected excitatory catatonia  	MEDICATIONS  (PRN):  LORazepam     Tablet 2 milliGRAM(s) Oral every 6 hours PRN Agitation  LORazepam   Injectable 2 milliGRAM(s) IntraMuscular Once PRN severe agitation  Seroquel 25mg Q6h PRN, Zyprexa 2.5mg IM PRN  nicotine  Polacrilex Gum 4 milliGRAM(s) Oral every 2 hours PRN smoking cessation  Observations: routine  Legal status: 9.39 emergency  Medical: Hx of breast CA, thyroid mass: Tx at [facility]  	Hypokalemia: resolved  	UTI: Bactrim 160mg/800mg BID x5 days  Dispo:pending

## 2022-06-10 NOTE — BH INPATIENT PSYCHIATRY PROGRESS NOTE - CURRENT MEDICATION
MEDICATIONS  (STANDING):  diVALproex ER 1000 milliGRAM(s) Oral at bedtime  LORazepam     Tablet 2 milliGRAM(s) Oral three times a day  multivitamin 1 Tablet(s) Oral daily  QUEtiapine 50 milliGRAM(s) Oral at bedtime  trimethoprim  160 mG/sulfamethoxazole 800 mG 1 Tablet(s) Oral two times a day    MEDICATIONS  (PRN):  LORazepam     Tablet 2 milliGRAM(s) Oral every 6 hours PRN Agitation  LORazepam   Injectable 2 milliGRAM(s) IntraMuscular Once PRN severe agitation  melatonin. 5 milliGRAM(s) Oral at bedtime PRN Insomnia  nicotine  Polacrilex Gum 4 milliGRAM(s) Oral every 2 hours PRN smoking cessation  OLANZapine Injectable 2.5 milliGRAM(s) IntraMuscular once PRN Severe agitation  QUEtiapine 25 milliGRAM(s) Oral every 6 hours PRN agitation   MEDICATIONS  (STANDING):  diVALproex ER 1000 milliGRAM(s) Oral at bedtime  LORazepam     Tablet 1.5 milliGRAM(s) Oral three times a day  multivitamin 1 Tablet(s) Oral daily  QUEtiapine 50 milliGRAM(s) Oral at bedtime  trimethoprim  160 mG/sulfamethoxazole 800 mG 1 Tablet(s) Oral two times a day    MEDICATIONS  (PRN):  LORazepam     Tablet 2 milliGRAM(s) Oral every 6 hours PRN Agitation  LORazepam   Injectable 2 milliGRAM(s) IntraMuscular Once PRN severe agitation  melatonin. 5 milliGRAM(s) Oral at bedtime PRN Insomnia  nicotine  Polacrilex Gum 4 milliGRAM(s) Oral every 2 hours PRN smoking cessation  OLANZapine Injectable 2.5 milliGRAM(s) IntraMuscular once PRN Severe agitation  QUEtiapine 25 milliGRAM(s) Oral every 6 hours PRN agitation

## 2022-06-10 NOTE — BH INPATIENT PSYCHIATRY PROGRESS NOTE - NSBHCHARTREVIEWVS_PSY_A_CORE FT
Vital Signs Last 24 Hrs  T(C): 36.9 (06-10-22 @ 14:52), Max: 36.9 (06-10-22 @ 14:52)  T(F): 98.5 (06-10-22 @ 14:52), Max: 98.5 (06-10-22 @ 14:52)  HR: --  BP: --  BP(mean): --  RR: --  SpO2: --    Orthostatic VS  06-10-22 @ 08:32  Lying BP: --/-- HR: --  Sitting BP: 116/68 HR: 76  Standing BP: 117/72 HR: 92  Site: upper left arm  Mode: electronic  Orthostatic VS  06-09-22 @ 19:24  Lying BP: --/-- HR: --  Sitting BP: 136/90 HR: 91  Standing BP: 130/83 HR: 91  Site: --  Mode: --  Orthostatic VS  06-09-22 @ 05:43  Lying BP: --/-- HR: --  Sitting BP: 116/81 HR: 78  Standing BP: 127/89 HR: 91  Site: --  Mode: --  Orthostatic VS  06-08-22 @ 19:23  Lying BP: --/-- HR: --  Sitting BP: 106/70 HR: 86  Standing BP: 108/74 HR: 74  Site: --  Mode: --

## 2022-06-10 NOTE — BH INPATIENT PSYCHIATRY PROGRESS NOTE - OTHER
disorganized, bizarre  cooperation limited by mental state unsteady at times patient's eyes are closed during interview

## 2022-06-11 PROCEDURE — 99232 SBSQ HOSP IP/OBS MODERATE 35: CPT

## 2022-06-11 RX ADMIN — DIVALPROEX SODIUM 1000 MILLIGRAM(S): 500 TABLET, DELAYED RELEASE ORAL at 00:26

## 2022-06-11 RX ADMIN — Medication 1 TABLET(S): at 20:02

## 2022-06-11 RX ADMIN — QUETIAPINE FUMARATE 50 MILLIGRAM(S): 200 TABLET, FILM COATED ORAL at 20:02

## 2022-06-11 RX ADMIN — Medication 2 MILLIGRAM(S): at 00:25

## 2022-06-11 RX ADMIN — Medication 1.5 MILLIGRAM(S): at 10:43

## 2022-06-11 RX ADMIN — Medication 1 TABLET(S): at 10:43

## 2022-06-11 RX ADMIN — Medication 5 MILLIGRAM(S): at 00:26

## 2022-06-11 RX ADMIN — Medication 1.5 MILLIGRAM(S): at 12:15

## 2022-06-11 RX ADMIN — Medication 1.5 MILLIGRAM(S): at 00:25

## 2022-06-11 RX ADMIN — DIVALPROEX SODIUM 1000 MILLIGRAM(S): 500 TABLET, DELAYED RELEASE ORAL at 20:03

## 2022-06-11 RX ADMIN — Medication 1.5 MILLIGRAM(S): at 20:03

## 2022-06-11 RX ADMIN — QUETIAPINE FUMARATE 25 MILLIGRAM(S): 200 TABLET, FILM COATED ORAL at 00:26

## 2022-06-11 RX ADMIN — QUETIAPINE FUMARATE 50 MILLIGRAM(S): 200 TABLET, FILM COATED ORAL at 00:26

## 2022-06-11 NOTE — BH INPATIENT PSYCHIATRY PROGRESS NOTE - OTHER
patient's eyes are closed during interview unsteady at times disorganized, bizarre  cooperation limited by mental state internally preoccupied

## 2022-06-11 NOTE — BH INPATIENT PSYCHIATRY PROGRESS NOTE - NSBHCHARTREVIEWVS_PSY_A_CORE FT
Vital Signs Last 24 Hrs  T(C): 36 (06-11-22 @ 06:08), Max: 36.9 (06-10-22 @ 14:52)  T(F): 96.8 (06-11-22 @ 06:08), Max: 98.5 (06-10-22 @ 14:52)  HR: 82 (06-11-22 @ 00:20) (82 - 82)  BP: 122/82 (06-11-22 @ 00:20) (122/82 - 122/82)  BP(mean): --  RR: 18 (06-11-22 @ 00:20) (18 - 18)  SpO2: 100% (06-11-22 @ 00:20) (100% - 100%)    Orthostatic VS  06-10-22 @ 21:36  Lying BP: --/-- HR: --  Sitting BP: 130/91 HR: 106  Standing BP: --/-- HR: --  Site: --  Mode: --  Orthostatic VS  06-10-22 @ 08:32  Lying BP: --/-- HR: --  Sitting BP: 116/68 HR: 76  Standing BP: 117/72 HR: 92  Site: upper left arm  Mode: electronic  Orthostatic VS  06-09-22 @ 19:24  Lying BP: --/-- HR: --  Sitting BP: 136/90 HR: 91  Standing BP: 130/83 HR: 91  Site: --  Mode: --

## 2022-06-11 NOTE — BH INPATIENT PSYCHIATRY PROGRESS NOTE - NSBHASSESSSUMMFT_PSY_ALL_CORE
51 yr old female, single, domiciled and unemployed.  currently, in treatment at [facility] for [facility] disorder. other diagnoses as per Psyckes include: SAD and unspecified bipolar disorder.  hx of poor compliance to meds and psych aftercare.  Has had prior psych admissions; as per Psyckes: no other recent psych admissions.  Per chart review, there has been no documented hx of SA; has hx of cannabis abuse.  Pertinent medical issues include: anemia, HTN, breast cA s/p surgery (with ongoing oncologic care at [facility]); hx of nontoxic goiter s/p thyroid surgery.  Today, presented to the ED BIB EMS after a bystander called 911 as Pt was undressing in public.    Plan:  Psychiatry: Seroquel 50mg HS for insomnia  	Depakote ER 1000mg HS  	Ativan 1.5mg TID for suspected excitatory catatonia  	MEDICATIONS  (PRN):  LORazepam     Tablet 2 milliGRAM(s) Oral every 6 hours PRN Agitation  LORazepam   Injectable 2 milliGRAM(s) IntraMuscular Once PRN severe agitation  Seroquel 25mg Q6h PRN, Zyprexa 2.5mg IM PRN  nicotine  Polacrilex Gum 4 milliGRAM(s) Oral every 2 hours PRN smoking cessation  Observations: routine  Legal status: 9.39 emergency  Medical: Hx of breast CA, thyroid mass: Tx at [facility]  	Hypokalemia: resolved  	UTI: Bactrim 160mg/800mg BID x5 days  Dispo:pending
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
## 2022-06-12 PROCEDURE — 99232 SBSQ HOSP IP/OBS MODERATE 35: CPT

## 2022-06-12 RX ADMIN — Medication 1 TABLET(S): at 11:17

## 2022-06-12 RX ADMIN — Medication 1.5 MILLIGRAM(S): at 08:27

## 2022-06-12 RX ADMIN — QUETIAPINE FUMARATE 25 MILLIGRAM(S): 200 TABLET, FILM COATED ORAL at 03:31

## 2022-06-12 RX ADMIN — Medication 4 MILLIGRAM(S): at 08:46

## 2022-06-12 RX ADMIN — QUETIAPINE FUMARATE 25 MILLIGRAM(S): 200 TABLET, FILM COATED ORAL at 22:22

## 2022-06-12 RX ADMIN — Medication 1.5 MILLIGRAM(S): at 14:20

## 2022-06-12 RX ADMIN — Medication 1 TABLET(S): at 08:27

## 2022-06-12 RX ADMIN — DIVALPROEX SODIUM 1000 MILLIGRAM(S): 500 TABLET, DELAYED RELEASE ORAL at 20:04

## 2022-06-12 RX ADMIN — Medication 1 TABLET(S): at 20:04

## 2022-06-12 RX ADMIN — Medication 2 MILLIGRAM(S): at 03:31

## 2022-06-12 RX ADMIN — Medication 1.5 MILLIGRAM(S): at 20:05

## 2022-06-12 RX ADMIN — Medication 5 MILLIGRAM(S): at 20:04

## 2022-06-12 RX ADMIN — Medication 2 MILLIGRAM(S): at 22:22

## 2022-06-12 RX ADMIN — QUETIAPINE FUMARATE 50 MILLIGRAM(S): 200 TABLET, FILM COATED ORAL at 20:05

## 2022-06-12 NOTE — BH INPATIENT PSYCHIATRY PROGRESS NOTE - NSBHCHARTREVIEWVS_PSY_A_CORE FT
Vital Signs Last 24 Hrs  T(C): 36.1 (06-12-22 @ 06:21), Max: 36.7 (06-11-22 @ 14:40)  T(F): 96.9 (06-12-22 @ 06:21), Max: 98 (06-11-22 @ 14:40)  HR: --  BP: --  BP(mean): --  RR: --  SpO2: --    Orthostatic VS  06-12-22 @ 08:17  Lying BP: --/-- HR: --  Sitting BP: 134/84 HR: 94  Standing BP: --/-- HR: --  Site: --  Mode: --  Orthostatic VS  06-10-22 @ 21:36  Lying BP: --/-- HR: --  Sitting BP: 130/91 HR: 106  Standing BP: --/-- HR: --  Site: --  Mode: --

## 2022-06-12 NOTE — BH INPATIENT PSYCHIATRY PROGRESS NOTE - NSBHASSESSSUMMFT_PSY_ALL_CORE
51 yr old female, single, domiciled and unemployed.  currently, in treatment at our [facility] for bipolar disorder. other diagnoses as per Psyckes include: SAD and unspecified bipolar disorder.  hx of poor compliance to meds and psych aftercare.  Has had prior psych admissions; as per Psyckes: no other recent psych admissions.  Per chart review, there has been no documented hx of SA; has hx of cannabis abuse.  Pertinent medical issues include: anemia, HTN, breast cA s/p surgery (with ongoing oncologic care at [facility]); hx of nontoxic goiter s/p thyroid surgery.  Today, presented to the ED BIB EMS after a bystander called 911 as Pt was undressing in public.    Plan:  Psychiatry: Seroquel 50mg HS for insomnia  	Depakote ER 1000mg HS  	Ativan 1.5mg TID for suspected excitatory catatonia  	MEDICATIONS  (PRN):  LORazepam     Tablet 2 milliGRAM(s) Oral every 6 hours PRN Agitation  LORazepam   Injectable 2 milliGRAM(s) IntraMuscular Once PRN severe agitation  Seroquel 25mg Q6h PRN, Zyprexa 2.5mg IM PRN  nicotine  Polacrilex Gum 4 milliGRAM(s) Oral every 2 hours PRN smoking cessation  Observations: routine  Legal status: 9.39 emergency  Medical: Hx of breast CA, thyroid mass: Tx at [facility]  	Hypokalemia: resolved  	UTI: Bactrim 160mg/800mg BID x5 days  Dispo:pending
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
## 2022-06-12 NOTE — BH INPATIENT PSYCHIATRY PROGRESS NOTE - CURRENT MEDICATION
MEDICATIONS  (STANDING):  diVALproex ER 1000 milliGRAM(s) Oral at bedtime  LORazepam     Tablet 1.5 milliGRAM(s) Oral three times a day  multivitamin 1 Tablet(s) Oral daily  QUEtiapine 50 milliGRAM(s) Oral at bedtime  trimethoprim  160 mG/sulfamethoxazole 800 mG 1 Tablet(s) Oral two times a day    MEDICATIONS  (PRN):  LORazepam     Tablet 2 milliGRAM(s) Oral every 6 hours PRN Agitation  LORazepam   Injectable 2 milliGRAM(s) IntraMuscular Once PRN severe agitation  melatonin. 5 milliGRAM(s) Oral at bedtime PRN Insomnia  nicotine  Polacrilex Gum 4 milliGRAM(s) Oral every 2 hours PRN smoking cessation  OLANZapine Injectable 2.5 milliGRAM(s) IntraMuscular once PRN Severe agitation  QUEtiapine 25 milliGRAM(s) Oral every 6 hours PRN agitation

## 2022-06-13 RX ORDER — CHLORPROMAZINE HCL 10 MG
25 TABLET ORAL ONCE
Refills: 0 | Status: COMPLETED | OUTPATIENT
Start: 2022-06-13 | End: 2022-06-13

## 2022-06-13 RX ADMIN — QUETIAPINE FUMARATE 50 MILLIGRAM(S): 200 TABLET, FILM COATED ORAL at 21:01

## 2022-06-13 RX ADMIN — Medication 1 TABLET(S): at 09:39

## 2022-06-13 RX ADMIN — Medication 1.5 MILLIGRAM(S): at 09:39

## 2022-06-13 RX ADMIN — QUETIAPINE FUMARATE 25 MILLIGRAM(S): 200 TABLET, FILM COATED ORAL at 21:01

## 2022-06-13 RX ADMIN — Medication 5 MILLIGRAM(S): at 21:02

## 2022-06-13 RX ADMIN — Medication 1 TABLET(S): at 21:01

## 2022-06-13 RX ADMIN — DIVALPROEX SODIUM 1000 MILLIGRAM(S): 500 TABLET, DELAYED RELEASE ORAL at 21:01

## 2022-06-13 RX ADMIN — Medication 2 MILLIGRAM(S): at 12:07

## 2022-06-13 RX ADMIN — Medication 25 MILLIGRAM(S): at 12:07

## 2022-06-13 RX ADMIN — Medication 2 MILLIGRAM(S): at 21:02

## 2022-06-13 RX ADMIN — QUETIAPINE FUMARATE 25 MILLIGRAM(S): 200 TABLET, FILM COATED ORAL at 09:39

## 2022-06-13 NOTE — BH INPATIENT PSYCHIATRY PROGRESS NOTE - NSBHFUPINTERVALHXFT_PSY_A_CORE
Pt compliant with medication and tolerating it well.  Chart reviewed, no events reported overnight.  Pt maintained on CO 1:1 for disorganization.  Pt remains disorganized, loose with flight of ideas. Pt. is speaking with staff but remains illogical. She is seen dancing inappropriately, was agitated, PMA. She was unable to respond to staff redirection and required IM Ativan and thorazine with little effect. Vital signs stable.

## 2022-06-13 NOTE — BH INPATIENT PSYCHIATRY PROGRESS NOTE - NSBHASSESSSUMMFT_PSY_ALL_CORE
51 yr old female, single, domiciled and unemployed.  currently, in treatment at [facility] for [facility] disorder. other diagnoses as per Psyckes include: SAD and unspecified bipolar disorder.  hx of poor compliance to meds and psych aftercare.  Has had prior psych admissions; as per Psyckes: no other recent psych admissions.  Per chart review, there has been no documented hx of SA; has hx of cannabis abuse.  Pertinent medical issues include: anemia, HTN, breast cA s/p surgery (with ongoing oncologic care at [place]); hx of nontoxic goiter s/p thyroid surgery.  Today, presented to the ED BIB EMS after a bystander called 911 as Pt was undressing in public.    No change in status; is manic w psychosis. Possible excitatory catatonia vs. other neurological disease process. Pt. remains disorganized, illogical, difficult to redirect. Neuro consult pending. Titrate Ativan to 2mg TID. Continue Seroquel; hold titration until after neuro consult. Bactrim continued for suspected UTI.    Plan:  Psychiatry: Seroquel 50mg HS for insomnia  	Depakote ER 1000mg HS  	Ativan 2mg TID for suspected excitatory catatonia  	MEDICATIONS  (PRN):  LORazepam     Tablet 2 milliGRAM(s) Oral every 6 hours PRN Agitation  LORazepam   Injectable 2 milliGRAM(s) IntraMuscular Once PRN severe agitation  Seroquel 25mg Q6h PRN, Zyprexa 2.5mg IM PRN  nicotine  Polacrilex Gum 4 milliGRAM(s) Oral every 2 hours PRN smoking cessation  Observations: constant observation  Legal status: 9.39 emergency  Medical: Hx of breast CA, thyroid mass: Tx at [facility]  	Hypokalemia: resolved  	UTI: Bactrim 160mg/800mg BID x5 days  Dispo:pending

## 2022-06-13 NOTE — BH INPATIENT PSYCHIATRY PROGRESS NOTE - NSBHCHARTREVIEWVS_PSY_A_CORE FT
Vital Signs Last 24 Hrs  T(C): 36.3 (06-13-22 @ 06:36), Max: 36.4 (06-12-22 @ 19:45)  T(F): 97.3 (06-13-22 @ 06:36), Max: 97.6 (06-12-22 @ 19:45)  HR: --  BP: --  BP(mean): --  RR: --  SpO2: --    Orthostatic VS  06-13-22 @ 08:23  Lying BP: --/-- HR: --  Sitting BP: 119/81 HR: 88  Standing BP: 113/81 HR: 94  Site: --  Mode: --  Orthostatic VS  06-12-22 @ 19:45  Lying BP: --/-- HR: --  Sitting BP: 133/83 HR: 15  Standing BP: 127/91 HR: 103  Site: --  Mode: --  Orthostatic VS  06-12-22 @ 08:17  Lying BP: --/-- HR: --  Sitting BP: 134/84 HR: 94  Standing BP: --/-- HR: --  Site: --  Mode: --

## 2022-06-14 PROCEDURE — 99232 SBSQ HOSP IP/OBS MODERATE 35: CPT

## 2022-06-14 RX ORDER — CHLORPROMAZINE HCL 10 MG
25 TABLET ORAL ONCE
Refills: 0 | Status: COMPLETED | OUTPATIENT
Start: 2022-06-14 | End: 2022-06-14

## 2022-06-14 RX ADMIN — Medication 2 MILLIGRAM(S): at 08:51

## 2022-06-14 RX ADMIN — DIVALPROEX SODIUM 1000 MILLIGRAM(S): 500 TABLET, DELAYED RELEASE ORAL at 20:10

## 2022-06-14 RX ADMIN — Medication 2 MILLIGRAM(S): at 05:53

## 2022-06-14 RX ADMIN — Medication 1 TABLET(S): at 08:52

## 2022-06-14 RX ADMIN — Medication 2 MILLIGRAM(S): at 20:18

## 2022-06-14 RX ADMIN — Medication 1 TABLET(S): at 20:17

## 2022-06-14 RX ADMIN — Medication 2 MILLIGRAM(S): at 12:07

## 2022-06-14 RX ADMIN — Medication 5 MILLIGRAM(S): at 20:18

## 2022-06-14 RX ADMIN — Medication 25 MILLIGRAM(S): at 21:45

## 2022-06-14 RX ADMIN — Medication 2 MILLIGRAM(S): at 14:31

## 2022-06-14 RX ADMIN — QUETIAPINE FUMARATE 25 MILLIGRAM(S): 200 TABLET, FILM COATED ORAL at 14:31

## 2022-06-14 RX ADMIN — QUETIAPINE FUMARATE 50 MILLIGRAM(S): 200 TABLET, FILM COATED ORAL at 20:18

## 2022-06-14 RX ADMIN — Medication 2 MILLIGRAM(S): at 21:45

## 2022-06-14 RX ADMIN — Medication 1 TABLET(S): at 08:51

## 2022-06-14 NOTE — BH INPATIENT PSYCHIATRY PROGRESS NOTE - NSBHFUPINTERVALHXFT_PSY_A_CORE
Pt compliant with medication and tolerating it well.  Chart reviewed, no events reported overnight.  Pt maintained on CO 1:1 for disorganization.  Pt. waxes and wanes, remains disorganized, difficult to redirect, believes she is in her mother's house, believes her bed is her car. Pt. gave permission to speak with her mother and cancer Tx team. Patient's mother reports the patient does not present in this way during previous manic episodes. She also believes that the patient's breast lumps were benign, however, review of HIE reveals Hx of malignant mass in 2020. Pt. has lumpectomy in 8/2020. Mammogram from 7/21 did not reveal malignancy. Per collateral from Dr. Penn who has treated the pt. medically, she refused radiation.   Medicine contacted, who confirmed AMS should have begun to resolve within 3 days of starting Bactrim; pt. unlikely to have UTI. Agreed that CT w/ contrast or MRI of head may be beneficial in ruling out metastatic breast CA. Neurology consulted and are currently evaluating the patient.

## 2022-06-14 NOTE — BH INPATIENT PSYCHIATRY PROGRESS NOTE - CURRENT MEDICATION
MEDICATIONS  (STANDING):  diVALproex ER 1000 milliGRAM(s) Oral at bedtime  LORazepam     Tablet 2 milliGRAM(s) Oral three times a day  multivitamin 1 Tablet(s) Oral daily  QUEtiapine 50 milliGRAM(s) Oral at bedtime  trimethoprim  160 mG/sulfamethoxazole 800 mG 1 Tablet(s) Oral two times a day    MEDICATIONS  (PRN):  LORazepam     Tablet 2 milliGRAM(s) Oral every 6 hours PRN Agitation  LORazepam   Injectable 2 milliGRAM(s) IntraMuscular Once PRN severe agitation  melatonin. 5 milliGRAM(s) Oral at bedtime PRN Insomnia  nicotine  Polacrilex Gum 4 milliGRAM(s) Oral every 2 hours PRN smoking cessation  OLANZapine Injectable 2.5 milliGRAM(s) IntraMuscular once PRN Severe agitation  QUEtiapine 25 milliGRAM(s) Oral every 6 hours PRN agitation   MEDICATIONS  (STANDING):  diVALproex ER 1000 milliGRAM(s) Oral at bedtime  LORazepam     Tablet 2 milliGRAM(s) Oral three times a day  multivitamin 1 Tablet(s) Oral daily  QUEtiapine 50 milliGRAM(s) Oral at bedtime    MEDICATIONS  (PRN):  LORazepam     Tablet 2 milliGRAM(s) Oral every 6 hours PRN Agitation  LORazepam   Injectable 2 milliGRAM(s) IntraMuscular Once PRN severe agitation  melatonin. 5 milliGRAM(s) Oral at bedtime PRN Insomnia  nicotine  Polacrilex Gum 4 milliGRAM(s) Oral every 2 hours PRN smoking cessation  OLANZapine Injectable 2.5 milliGRAM(s) IntraMuscular once PRN Severe agitation  QUEtiapine 25 milliGRAM(s) Oral every 6 hours PRN agitation

## 2022-06-14 NOTE — CHART NOTE - NSCHARTNOTEFT_GEN_A_CORE
Patient interviewed and examined with Dr. Perez, Full consult note to follow.    Oscar Winston M.D.  [phone]  [facility] License # [id number]  NPI # [id number]

## 2022-06-14 NOTE — BH INPATIENT PSYCHIATRY PROGRESS NOTE - NSBHCHARTREVIEWVS_PSY_A_CORE FT
Vital Signs Last 24 Hrs  T(C): 36.4 (06-14-22 @ 14:56), Max: 37.1 (06-13-22 @ 19:55)  T(F): 97.5 (06-14-22 @ 14:56), Max: 98.8 (06-13-22 @ 19:55)  HR: --  BP: --  BP(mean): --  RR: --  SpO2: --    Orthostatic VS  06-14-22 @ 08:22  Lying BP: --/-- HR: --  Sitting BP: 120/79 HR: 94  Standing BP: 116/70 HR: 104  Site: --  Mode: --  Orthostatic VS  06-13-22 @ 19:55  Lying BP: --/-- HR: --  Sitting BP: 120/86 HR: 100  Standing BP: 124/90 HR: 102  Site: --  Mode: --  Orthostatic VS  06-13-22 @ 08:23  Lying BP: --/-- HR: --  Sitting BP: 119/81 HR: 88  Standing BP: 113/81 HR: 94  Site: --  Mode: --  Orthostatic VS  06-12-22 @ 19:45  Lying BP: --/-- HR: --  Sitting BP: 133/83 HR: 15  Standing BP: 127/91 HR: 103  Site: --  Mode: --   Vital Signs Last 24 Hrs  T(C): 37 (06-15-22 @ 06:16), Max: 37 (06-15-22 @ 06:16)  T(F): 98.6 (06-15-22 @ 06:16), Max: 98.6 (06-15-22 @ 06:16)  HR: --  BP: --  BP(mean): --  RR: --  SpO2: --    Orthostatic VS  06-14-22 @ 19:36  Lying BP: --/-- HR: --  Sitting BP: 122/84 HR: 97  Standing BP: 121/90 HR: 93  Site: --  Mode: electronic  Orthostatic VS  06-14-22 @ 08:22  Lying BP: --/-- HR: --  Sitting BP: 120/79 HR: 94  Standing BP: 116/70 HR: 104  Site: --  Mode: --  Orthostatic VS  06-13-22 @ 19:55  Lying BP: --/-- HR: --  Sitting BP: 120/86 HR: 100  Standing BP: 124/90 HR: 102  Site: --  Mode: --  Orthostatic VS  06-13-22 @ 08:23  Lying BP: --/-- HR: --  Sitting BP: 119/81 HR: 88  Standing BP: 113/81 HR: 94  Site: --  Mode: --

## 2022-06-14 NOTE — BH INPATIENT PSYCHIATRY PROGRESS NOTE - NSBHASSESSSUMMFT_PSY_ALL_CORE
51 yr old female, single, domiciled and unemployed.  currently, in [facility] for [facility]. other diagnoses as per Psyckes include: SAD and unspecified bipolar disorder.  hx of poor compliance to meds and psych aftercare.  Has had prior psych admissions; as per Psyckes: no other recent psych admissions.  Per chart review, there has been no documented hx of SA; has hx of cannabis abuse.  Pertinent medical issues include: anemia, HTN, breast cA s/p surgery (with ongoing oncologic care at [place]); hx of nontoxic goiter s/p thyroid surgery.  Today, presented to the ED BIB EMS after a bystander called 911 as Pt was undressing in public.    No change in status; pt. is disorganized, delusional, is waxing and waning; possible delirium related to medical issue. Repeat CBC and CMP tomorrow. Neurology evaluation in progress and pending CT scan w. contrast or MRI of head to rule out mets to brain, given Hx of breast CA and refusing radiation treatment. Neuro consult pending. Continue medications as ordered. Hold antipsychotic titration until neuro consult is completed.     Plan:  Psychiatry: Seroquel 50mg HS for insomnia  	Depakote ER 1000mg HS  	Ativan 2mg TID for suspected excitatory catatonia  	MEDICATIONS  (PRN):  LORazepam     Tablet 2 milliGRAM(s) Oral every 6 hours PRN Agitation  LORazepam   Injectable 2 milliGRAM(s) IntraMuscular Once PRN severe agitation  Seroquel 25mg Q6h PRN, Zyprexa 2.5mg IM PRN  nicotine  Polacrilex Gum 4 milliGRAM(s) Oral every 2 hours PRN smoking cessation  Observations: constant observation  Legal status: 9.39 emergency  Medical: Hx of breast CA, thyroid mass: Tx at [facility]  	Hypokalemia: resolved  	UTI: Bactrim 160mg/800mg BID x5 days  Dispo:pending

## 2022-06-15 LAB
ALBUMIN SERPL ELPH-MCNC: 4.1 G/DL — SIGNIFICANT CHANGE UP (ref 3.3–5)
ALP SERPL-CCNC: 66 U/L — SIGNIFICANT CHANGE UP (ref 40–120)
ALT FLD-CCNC: 24 U/L — SIGNIFICANT CHANGE UP (ref 4–33)
ANION GAP SERPL CALC-SCNC: 10 MMOL/L — SIGNIFICANT CHANGE UP (ref 7–14)
AST SERPL-CCNC: 31 U/L — SIGNIFICANT CHANGE UP (ref 4–32)
BASOPHILS # BLD AUTO: 0.02 K/UL — SIGNIFICANT CHANGE UP (ref 0–0.2)
BASOPHILS NFR BLD AUTO: 0.3 % — SIGNIFICANT CHANGE UP (ref 0–2)
BILIRUB SERPL-MCNC: 0.2 MG/DL — SIGNIFICANT CHANGE UP (ref 0.2–1.2)
BUN SERPL-MCNC: 20 MG/DL — SIGNIFICANT CHANGE UP (ref 7–23)
CALCIUM SERPL-MCNC: 9.4 MG/DL — SIGNIFICANT CHANGE UP (ref 8.4–10.5)
CHLORIDE SERPL-SCNC: 102 MMOL/L — SIGNIFICANT CHANGE UP (ref 98–107)
CO2 SERPL-SCNC: 25 MMOL/L — SIGNIFICANT CHANGE UP (ref 22–31)
CREAT SERPL-MCNC: 0.88 MG/DL — SIGNIFICANT CHANGE UP (ref 0.5–1.3)
EGFR: 81 ML/MIN/1.73M2 — SIGNIFICANT CHANGE UP
EOSINOPHIL # BLD AUTO: 0.17 K/UL — SIGNIFICANT CHANGE UP (ref 0–0.5)
EOSINOPHIL NFR BLD AUTO: 2.5 % — SIGNIFICANT CHANGE UP (ref 0–6)
GLUCOSE SERPL-MCNC: 91 MG/DL — SIGNIFICANT CHANGE UP (ref 70–99)
HCT VFR BLD CALC: 37.7 % — SIGNIFICANT CHANGE UP (ref 34.5–45)
HGB BLD-MCNC: 11.9 G/DL — SIGNIFICANT CHANGE UP (ref 11.5–15.5)
IANC: 3.5 K/UL — SIGNIFICANT CHANGE UP (ref 1.8–7.4)
IMM GRANULOCYTES NFR BLD AUTO: 0.1 % — SIGNIFICANT CHANGE UP (ref 0–1.5)
LYMPHOCYTES # BLD AUTO: 2.41 K/UL — SIGNIFICANT CHANGE UP (ref 1–3.3)
LYMPHOCYTES # BLD AUTO: 36 % — SIGNIFICANT CHANGE UP (ref 13–44)
MCHC RBC-ENTMCNC: 30.1 PG — SIGNIFICANT CHANGE UP (ref 27–34)
MCHC RBC-ENTMCNC: 31.6 GM/DL — LOW (ref 32–36)
MCV RBC AUTO: 95.4 FL — SIGNIFICANT CHANGE UP (ref 80–100)
MONOCYTES # BLD AUTO: 0.59 K/UL — SIGNIFICANT CHANGE UP (ref 0–0.9)
MONOCYTES NFR BLD AUTO: 8.8 % — SIGNIFICANT CHANGE UP (ref 2–14)
NEUTROPHILS # BLD AUTO: 3.5 K/UL — SIGNIFICANT CHANGE UP (ref 1.8–7.4)
NEUTROPHILS NFR BLD AUTO: 52.3 % — SIGNIFICANT CHANGE UP (ref 43–77)
NRBC # BLD: 0 /100 WBCS — SIGNIFICANT CHANGE UP
NRBC # FLD: 0 K/UL — SIGNIFICANT CHANGE UP
PLATELET # BLD AUTO: 311 K/UL — SIGNIFICANT CHANGE UP (ref 150–400)
POTASSIUM SERPL-MCNC: 4.3 MMOL/L — SIGNIFICANT CHANGE UP (ref 3.5–5.3)
POTASSIUM SERPL-SCNC: 4.3 MMOL/L — SIGNIFICANT CHANGE UP (ref 3.5–5.3)
PROT SERPL-MCNC: 6.8 G/DL — SIGNIFICANT CHANGE UP (ref 6–8.3)
RBC # BLD: 3.95 M/UL — SIGNIFICANT CHANGE UP (ref 3.8–5.2)
RBC # FLD: 13.6 % — SIGNIFICANT CHANGE UP (ref 10.3–14.5)
SODIUM SERPL-SCNC: 137 MMOL/L — SIGNIFICANT CHANGE UP (ref 135–145)
WBC # BLD: 6.7 K/UL — SIGNIFICANT CHANGE UP (ref 3.8–10.5)
WBC # FLD AUTO: 6.7 K/UL — SIGNIFICANT CHANGE UP (ref 3.8–10.5)

## 2022-06-15 RX ADMIN — Medication 2 MILLIGRAM(S): at 20:08

## 2022-06-15 RX ADMIN — QUETIAPINE FUMARATE 50 MILLIGRAM(S): 200 TABLET, FILM COATED ORAL at 20:08

## 2022-06-15 RX ADMIN — Medication 2 MILLIGRAM(S): at 09:33

## 2022-06-15 RX ADMIN — Medication 2 MILLIGRAM(S): at 21:26

## 2022-06-15 RX ADMIN — QUETIAPINE FUMARATE 25 MILLIGRAM(S): 200 TABLET, FILM COATED ORAL at 21:24

## 2022-06-15 RX ADMIN — DIVALPROEX SODIUM 1000 MILLIGRAM(S): 500 TABLET, DELAYED RELEASE ORAL at 20:08

## 2022-06-15 RX ADMIN — Medication 2 MILLIGRAM(S): at 13:09

## 2022-06-15 NOTE — BH INPATIENT PSYCHIATRY PROGRESS NOTE - NSBHCHARTREVIEWVS_PSY_A_CORE FT
Vital Signs Last 24 Hrs  T(C): 36.1 (06-15-22 @ 14:23), Max: 37 (06-15-22 @ 06:16)  T(F): 97 (06-15-22 @ 14:23), Max: 98.6 (06-15-22 @ 06:16)  HR: --  BP: --  BP(mean): --  RR: --  SpO2: --    Orthostatic VS  06-14-22 @ 19:36  Lying BP: --/-- HR: --  Sitting BP: 122/84 HR: 97  Standing BP: 121/90 HR: 93  Site: --  Mode: electronic  Orthostatic VS  06-14-22 @ 08:22  Lying BP: --/-- HR: --  Sitting BP: 120/79 HR: 94  Standing BP: 116/70 HR: 104  Site: --  Mode: --  Orthostatic VS  06-13-22 @ 19:55  Lying BP: --/-- HR: --  Sitting BP: 120/86 HR: 100  Standing BP: 124/90 HR: 102  Site: --  Mode: --

## 2022-06-15 NOTE — CHART NOTE - NSCHARTNOTEFT_GEN_A_CORE
Referring Clinician: Ian Blake NP  Source of information: The patient’s chart, Ian Blake [facility] nursing staff, and patient’s mother   Reason for Consult: Concern for delirium     History of Present Illness: The patient is a 51-year-old right-handed woman, PPHx of bipolar disorder, PMH of anemia, hypertension, breast cancer s/p right breast lumpectomy (declined radiation), hx of nontoxic goiter s/p thyroidectomy who was brought to [facility] ED on 2022 by EMS activated by bystander due to patient undressing herself in public. She was then transferred to [place] [id number] later for concern of manic episode. During this hospital course, the patient was restarted on previous psychotropic medications, Zyprexa 20 mg at bedtime and Depakote 1000 mg at bedtime. Zyprexa was then discontinued due to concern of excitatory catatonia (per ED having stereotyped movements). Patient started on lorazepam and titrated to 2 mg TID for catatonia with minimal improvement. Patient started on quetiapine 50 mg for psychosis. She has required several additional quetiapine and lorazepam PRNs for agitation (including quetiapine 25 mg once today and lorazepam 2 mg twice today). The patient has been disorganized with altered mental status. She was also seen by medicine due to concern for delirium secondary to UTI (urine culture with moderate leukocyte esterase and many bacteria but contaminated sample) and has completed 5-day course of Bactrim with no improvement in mental status. Neurology was consulted to evaluate the patient for delirium given that, per the Primary Psychiatric Team, the patient has been noted to have waxing and waning mental status. Per staff, patient has been noted on the unit to have disorganized behavior requiring constant observation with one episode of urinary incontinence last week. On interview, patient largely alert but intermittently drowsy. She required frequent re-direction during examination and had difficulty following directions. She was disoriented and appeared internally preoccupied. She continued to refer to her bed as her car and confused writer with a family member. She remembered having thyroid and breast surgery and stated she follows with Dr. Cho from ENT (unable to verify this). She was unable to provide additional history.     Per collateral history from mother and Dr. Yanez, patient currently far from baseline. At baseline, she is described as hyperverbal and active. She has been independent and caretaker of niece and mother. The usual presentation of her manic episodes includes pressured speech, grandiosity, flight of idea, and elevated mood, but no altered mental status. She had been well controlled on her psychotropic medications for several years. Patient was seeing Dr. Licona as a PCP in . She had told him she refused radiation after lumpectomy but was following up with oncology team in [place]. Per oncology at [place], she was only seen there once, and did not follow up. Patient last seen by psychiatrist 2022. Per mother, patient called her about 1-2 weeks ago and seemed a bit off and not herself.     Allergies: No known allergies    Current Medications: Standing: divalproex ER 1000 mg Oral at bedtime, lorazepam 2 mg Oral TID, multivitamin 1 tablet Oral daily, quetiapine 50 mg Oral at bedtime, trimethoprim 160 mg/sulfamethoxazole 800 mg Oral BID (day 5 of 5)  PRN: lorazepam 2 mg Oral q6h PRN for agitation, lorazepam 2 mg IM PRN for severe agitation, olanzapine 2.5 mg IM once PRN for severe agitation, quetiapine 25 mg Oral q6h PRN for agitation, melatonin 5 mg Oral at bedtime PRN for Insomnia, nicotine polacrilex gum 4 mg Oral q2h PRN for smoking cessation    Past Medical History: Anemia, hypertension, right breast invasive ductal carcinoma, nontoxic goiter    Past Surgical History: History of left breast biopsy  – pathology benign. History of right breast lumpectomy  – ductal carcinoma. History of thyroid surgery.    Past Psychiatric History: Bipolar disorder and alcohol use disorder (last use 8 years ago)     Family History: Family history of breast cancer, 3 maternal aunts. Otherwise noncontributory.    Social History: The patient is currently living alone and unemployed. She is the caretaker of her mother and niece. Migrated from [facility].  History of alcohol (last 8 years ago) and marijuana use.     ROS: (Limited, patient disorganized)  Constitutional: Denies fever.  HEENT: Denies headaches or changes in vision  Respiratory: Denies shortness of breath.  Cardiac: Denies chest pain or palpitations.  Genitourinary: Denies dysuria.  Musculoskeletal: Denies muscle aches, joint pain.  Neuro: Denies weakness, dizziness.  Skin: No rashes or new lesions reported.    Physical Exam:  VS: 97.5°F, /79, HR 94  Gen: Largely alert, but sometimes drowsy, and in no acute distress.   HEENT: Normocephalic, atraumatic. Hair growth on chin.   Neck: Supple with full range of motion. Horizontal surgical scar seen across her neck.   Chest: Lungs clear to auscultation bilaterally.  CV: Regular rate & rhythm. Normal S1/S2 present.  Abdomen: Soft, nontender, and nondistended. Normoactive bowel sounds. No suprapubic tenderness.  Back: No spinal deviation noted. No spinous or costovertebral angle tenderness.  Ext: +1 pitting edema. No clubbing or cyanosis of digits.    Neurological Exam:  MSE: The patient is alert but sometimes slightly drowsy and in no acute distress. Patient is cooperative with interview, but requiring frequent re-direction. Occasionally able to follow commands. Eye contact is fair to poor. Affect was euthymic but at times irritable, labile. Slightly slurred speech, hyperverbal but normal tone and volume. Thought process is tangential and illogical. Thought content is disorganized, hypersexual. Internally preoccupied.       MMSE: 14 based on serial 7s; 14 based on spelling WORLD backwards  Orientation: Time:  (only stated year and month, not season, date or day)  Location:  (only Pending sale to [facility] and hospitals)  Registration: 3/3  Attention: 0/5 (Serial 7s) or 0/5 (spell WORLD backwards)  Recall: 03  Namin/2  Repetition: 1  3-Stage Command: 3/3  Written Instruction:   Written Sentence: 0/1  Copying intersecting pentagons: 0/1     MIS 0/8 (0/4 words spontaneously, 0/4 words with category cues)    CN II – PERRLA. Peripheral fields intact bilaterally by confrontation.   CN III, IV, VI – EOMI, without nystagmus.  CN V – V1-V3 intact to LT.  CN VII – No facial asymmetry; able to smile, close eyes against resistance, puff cheeks against resistance symmetrically and bilaterally.  CN VIII – Hearing intact bilaterally to finger rub.  CN IX, X – Palate elevates symmetrically bilaterally; uvula is midline.  CN XI – Shoulder shrug and head turn intact bilaterally, symmetric with good power.  CN XII – Tongue midline, no deviation.    Motor: Normal bulk and tone. No pronator drift bilaterally. Manual motor testing was limited by patient’s effort and patient required frequent redirection, but there was no overt weakness. No cogwheel rigidity noted. No bradykinesia.  No tremors noted. No asterixis or myoclonus.    Sensory: Temperature, light touch, and proprioception intact in upper and lower extremities bilaterally.    Reflexes:   	B-radialis	Biceps	Triceps	Patellar	Ankle	Plantar  Right	2+	2+	2+	2+	2+	Flexor  Left	2+	2+	2+	2+	2+	Flexor    Cordination: Finger-nose-finger with no dysmetria. Unable to do heel to shin due to disorganization. Finger and toe tapping rhythmic and symmetric.   Gait: Unassisted regular gait with narrow base, smooth stride, and normal arm swing but with some difficulties with her balance. She can stand on heels and toes. No Romberg sign. Postural reflexes intact by pull test.      Labs:   06/15/2022: WBC 6.70, RBC 3.95, Hgb 11.9, Hct 37.7, Platelets 311,   2022: Na 141, K 3.9, Cl 103, CO2 26, BUN 12, Cr 0.73, Glucose 105, Ca 9.8, AST 44, ALT 40, TSH 2.68, B12 815, valproic acid 80.3, UA significant for moderate leukocyte esterase and many bacteria, Ucx >=3 organism, contamination.  2022: Utox + THC, valproic acid <3.15     Imaging:   CT Head 2022 (images reviewed at [facility]): no hydrocephalus, acute intracranial hemorrhage, mass effect or brain edema.    MG MAMMO DIAG W MARIA L BI 2021: FINDINGS: The breasts are heterogeneously dense, which may obscure small masses. A scar marker overlies the upper outer right breast, at the site of new postsurgical scarring. The previous spiculated mass in this region is no longer seen. Previously described architectural distortion in the upper outer left breast, compatible with postsurgical change, is without significant change, previously marked with a scar marker on the left spot compression MLO view dated 2012. No suspicious mass, suspicious microcalcifications, or other sign of malignancy is identified in either breast at this time. The patient's referring physician has also requested a screening breast ultrasound. The patient left the department before the ultrasound could be obtained. She should therefore schedule the ultrasound as soon as she is able.   IMPRESSION:   1. Dense breasts.   2. No mammographic evidence of malignancy in either breast at this time. No evidence of changes of a suspicious nature since the prior images    Pathology  2020: Final Diagnosis 1. Breast, right, 10 o’clock 3 cm FN, with Ca++, core biopsy- Invasive moderately differentiated ductal carcinoma- Salcha score 7/9 (3 + 2-3 + 1) in this limited material- Invasive tumor measures at least 1.0 cm- Ductal carcinoma in situ, intermediate to high nuclear grade, cribriform, and papillary types associated with calcifications and necrosis- Lymphovascular permeation by tumor not seen- ER/OK/HER-2 study in progress; an addendum will follow. Dr. Huerta was notified of the diagnosis via secure email on 20.  2020: Addendum IMMUNOHISTOCHEMICAL ANALYSIS OF BREAST CANCER BIOMARKERSRESULT:  Right breast at 10 o?clock, 3 cm FN; Core biopsy. ESTROGEN RECEPTOR (ER):Positive: 95% moderate to strong nuclear staining. PROGESTERONE RECEPTOR (PgR):Positive: 95% strong nuclear staining. HER-2:Negative: 0+ membrane staining   Paraffin Block Used: Block 1A with invasive carcinoma. Specimen Fixation/Processing:  10% neutral buffered formalin. Total Formalin Fixation Time and Cold Ischemia Time:  Meet ASCO/CAP Guidelines:  Yes Controls:  External Control :Positive batch control (ER/PgR/HER-2):Positive (Reviewed by IHC Laboratory)Separate built-in positive control on slides(ER/PgR/Her2):  Positive Internal Control: Present, and positive for ER/PgR Antibody Clones: Estrogen receptor clone: [id number] (Bond);Progesterone receptor clone: [id number] (Bond): HER-2 clone: [id number](Bond).  Detection System:  Peroxidase-antiperoxidase /DAB Technique. Result Interpretation Criteria (as per ASCO/CAP Guideline[//2018]): Positive ER/PgR Result:  Immunoreactive tumor cells present (> or=1%).  Negative ER/PgR Result:  < 1% Immunoreactive tumor cells present.  Uninterpretable: Uninterpretable for ER or PgR iffinding that no tumor nuclei are immunoreactive and that internal epithelial elements present in the sample or separately submitted from the same sample lack any nuclear staining. HER2 Negative Score 0 Result:  No staining observed or incomplete, faint or barely perceptible membrane staining in < or= 10% of invasive tumor cells.  HER2 Negative Score 1+ Result: Incomplete, faint or barely perceptible membrane staining in >10% of invasive tumor cells.  HER2 Equivocal Score 2+ Result: Incomplete and or weak to moderate circumferential membrane staining in > 10%  of invasive tumor cells or Complete, intense, circumferential membrane staining in < or = 10% of invasive tumor cells.  HER2 Positive Score 3+ Result: Complete, intense, circumferential membrane staining in > 10% of invasive tumor cells.    Assessment: The patient is a 51 year-old right-handed woman PPHx of bipolar disorder, PMH of anemia, hypertension, breast cancer s/p right breast lumpectomy (declined radiation), hx of nontoxic goiter s/p thyroidectomy who brought to [facility] ED on 2022 by EMS activated by bystander for disorganized behavior and transferred to [place] [address] later that day for concern of manic episode. Per collateral history, the patient is independent and was doing well up to the last 1-2 weeks. They state her current presentation is atypicall from previous manic episodes. During the interview, the patient appeared sedated, internally preoccupied and needed frequent re-direction. Medical examination notable for hirsutism and bilateral pedal edema. On neurological exam, the patient scored poorly in orientation, attention and recall but may be partially attributable to the high dose of lorazepam. Otherwise, no localized findings. The patient may have superimposed encephalopathy given altered mental status. Given her history of invasive ductal carcinoma with declined aftercare treatment (radiation and hormonal treatment), the possibility of CNS metastatic breast cancer should be excluded. The differential diagnosis could include paraneoplastic autoimmune encephalopathy.    Recommendations:  1.	MRI of brain with and without gadolinium   2.	Further bloodwork including PT/INR, aPTT, Syphilis screen, ESR, serum autoimmune encephalopathy panel ([facility] Test ID: ENS2), anti-cardiolipin, p-ANCA, c-ANCA, anti-TPO, anti-thyroglobulin, ADRIANNE, anti-ß2 microglobulin, anti-dsDNA, C3 level, and C4 level.    Fatemeh Perez M.D.  [phone]      Patient interviewed and examined with Dr. Perez. Brain CT images reviewed on [facility]. I reviewed Ana’s note and agree with the documented findings and plan of care. Findings and recommendations discussed with Ian Blake NP and Dr. Riddle.     Oscar Winston M.D.   [phone]   [facility] License # [id number]   NPI # [id number]

## 2022-06-15 NOTE — BH INPATIENT PSYCHIATRY PROGRESS NOTE - NSBHFUPINTERVALHXFT_PSY_A_CORE
Pt compliant with medication and tolerating it well.  Chart reviewed, no events reported overnight.  Pt maintained on CO 1:1 for disorganization.  Pt. waxes and wanes, remains grossly disorganized, difficult to redirect but with improved linearity during interview today. Pt. denied physical complaints. Reports urinating normally, denies constipation. Denies A/H, V/H, S/i/p, H/i/p. CBC w/diff and CMP WNL.  Neurology consult completed. MRI w/ and w/o contrast ordered to rule metastatic cancer. Additional bloodwork recommended:  PT/INR, aPTT, Syphilis screen, ESR, serum autoimmune encephalopathy panel ([facility] Test ID: ENS2), anti-cardiolipin, p-ANCA, c-ANCA, anti-TPO, anti-thyroglobulin, ADRIANNE, anti-ß2 microglobulin, anti-dsDNA, C3 level, and C4 level.

## 2022-06-15 NOTE — BH INPATIENT PSYCHIATRY PROGRESS NOTE - NSBHASSESSSUMMFT_PSY_ALL_CORE
51 yr old female, single, domiciled and unemployed.  currently, in [facility] at [facility] for [facility] disorder. other diagnoses as per Psyckes include: SAD and unspecified bipolar disorder.  hx of poor compliance to meds and psych aftercare.  Has had prior psych admissions; as per Psyckes: no other recent psych admissions.  Per chart review, there has been no documented hx of SA; has hx of cannabis abuse.  Pertinent medical issues include: anemia, HTN, breast cA s/p surgery (with ongoing oncologic care at [place]); hx of nontoxic goiter s/p thyroid surgery.  Today, presented to the ED BIB EMS after a bystander called 911 as Pt was undressing in public.    No change in status; pt. is disorganized, delusional, is waxing and waning; possible delirium related to medical etiology. Neurology consult completed. Recommendations:   1.	MRI of brain with and without gadolinium   2.	Further bloodwork including PT/INR, aPTT, Syphilis screen, ESR, serum autoimmune encephalopathy panel ([facility] Test ID: ENS2), anti-cardiolipin, p-ANCA, c-ANCA, anti-TPO, anti-thyroglobulin, ADRIANNE, anti-ß2 microglobulin, anti-dsDNA, C3 level, and C4 level.  Will consult with lab to determine how to order recommended labs. MRI ordered and appointment is pending. Taper AM and afternoon Ativan to 1.5mg. Continue other medications as ordered.       Plan:  Psychiatry: Seroquel 50mg HS for insomnia  	Depakote ER 1000mg HS  	Ativan 1.5mg 9AM,  for suspected excitatory catatonia  	MEDICATIONS  (PRN):  LORazepam     Tablet 2 milliGRAM(s) Oral every 6 hours PRN Agitation  LORazepam   Injectable 2 milliGRAM(s) IntraMuscular Once PRN severe agitation  Seroquel 25mg Q6h PRN, Zyprexa 2.5mg IM PRN  nicotine  Polacrilex Gum 4 milliGRAM(s) Oral every 2 hours PRN smoking cessation  Observations: constant observation  Legal status: 9.39 emergency  Medical: Hx of breast CA, thyroid mass: Tx at [facility]  	Hypokalemia: resolved  	UTI: Bactrim 160mg/800mg BID x5 days  Dispo:pending

## 2022-06-15 NOTE — BH INPATIENT PSYCHIATRY PROGRESS NOTE - CURRENT MEDICATION
MEDICATIONS  (STANDING):  diVALproex ER 1000 milliGRAM(s) Oral at bedtime  LORazepam     Tablet 2 milliGRAM(s) Oral three times a day  multivitamin 1 Tablet(s) Oral daily  QUEtiapine 50 milliGRAM(s) Oral at bedtime    MEDICATIONS  (PRN):  LORazepam     Tablet 2 milliGRAM(s) Oral every 6 hours PRN Agitation  LORazepam   Injectable 2 milliGRAM(s) IntraMuscular Once PRN severe agitation  melatonin. 5 milliGRAM(s) Oral at bedtime PRN Insomnia  nicotine  Polacrilex Gum 4 milliGRAM(s) Oral every 2 hours PRN smoking cessation  OLANZapine Injectable 2.5 milliGRAM(s) IntraMuscular once PRN Severe agitation  QUEtiapine 25 milliGRAM(s) Oral every 6 hours PRN agitation

## 2022-06-16 PROCEDURE — 70553 MRI BRAIN STEM W/O & W/DYE: CPT | Mod: 26

## 2022-06-16 PROCEDURE — 99232 SBSQ HOSP IP/OBS MODERATE 35: CPT

## 2022-06-16 RX ORDER — HALOPERIDOL DECANOATE 100 MG/ML
5 INJECTION INTRAMUSCULAR EVERY 6 HOURS
Refills: 0 | Status: DISCONTINUED | OUTPATIENT
Start: 2022-06-16 | End: 2022-06-28

## 2022-06-16 RX ORDER — HALOPERIDOL DECANOATE 100 MG/ML
5 INJECTION INTRAMUSCULAR ONCE
Refills: 0 | Status: DISCONTINUED | OUTPATIENT
Start: 2022-06-16 | End: 2022-06-28

## 2022-06-16 RX ORDER — QUETIAPINE FUMARATE 200 MG/1
50 TABLET, FILM COATED ORAL AT BEDTIME
Refills: 0 | Status: DISCONTINUED | OUTPATIENT
Start: 2022-06-16 | End: 2022-06-16

## 2022-06-16 RX ORDER — RISPERIDONE 4 MG/1
1 TABLET ORAL
Refills: 0 | Status: DISCONTINUED | OUTPATIENT
Start: 2022-06-16 | End: 2022-06-17

## 2022-06-16 RX ADMIN — Medication 1.5 MILLIGRAM(S): at 13:12

## 2022-06-16 RX ADMIN — Medication 2 MILLIGRAM(S): at 02:10

## 2022-06-16 RX ADMIN — Medication 0.5 MILLIGRAM(S): at 15:55

## 2022-06-16 RX ADMIN — Medication 1 TABLET(S): at 11:00

## 2022-06-16 RX ADMIN — Medication 1.5 MILLIGRAM(S): at 20:05

## 2022-06-16 RX ADMIN — Medication 1.5 MILLIGRAM(S): at 11:00

## 2022-06-16 RX ADMIN — RISPERIDONE 1 MILLIGRAM(S): 4 TABLET ORAL at 20:05

## 2022-06-16 RX ADMIN — Medication 5 MILLIGRAM(S): at 20:05

## 2022-06-16 NOTE — BH INPATIENT PSYCHIATRY PROGRESS NOTE - NSBHCHARTREVIEWVS_PSY_A_CORE FT
Vital Signs Last 24 Hrs  T(C): 30.7 (06-16-22 @ 22:35), Max: 37.1 (06-16-22 @ 11:05)  T(F): 87.3 (06-16-22 @ 22:35), Max: 98.7 (06-16-22 @ 11:05)  HR: --  BP: --  BP(mean): --  RR: --  SpO2: --    Orthostatic VS  06-16-22 @ 19:14  Lying BP: --/-- HR: --  Sitting BP: 120/62 HR: 101  Standing BP: --/-- HR: --  Site: --  Mode: --  Orthostatic VS  06-16-22 @ 11:05  Lying BP: --/-- HR: --  Sitting BP: 125/89 HR: 98  Standing BP: 126/82 HR: 101  Site: --  Mode: electronic  Orthostatic VS  06-15-22 @ 19:14  Lying BP: --/-- HR: --  Sitting BP: 115/73 HR: 111  Standing BP: 103/76 HR: 110  Site: --  Mode: --

## 2022-06-16 NOTE — BH INPATIENT PSYCHIATRY PROGRESS NOTE - PRN MEDS
MEDICATIONS  (PRN):  haloperidol     Tablet 5 milliGRAM(s) Oral every 6 hours PRN Agitation  haloperidol    Injectable 5 milliGRAM(s) IntraMuscular once PRN Severe agitation  LORazepam     Tablet 2 milliGRAM(s) Oral every 6 hours PRN Agitation  LORazepam   Injectable 2 milliGRAM(s) IntraMuscular Once PRN severe agitation  melatonin. 5 milliGRAM(s) Oral at bedtime PRN Insomnia  nicotine  Polacrilex Gum 4 milliGRAM(s) Oral every 2 hours PRN smoking cessation

## 2022-06-16 NOTE — BH INPATIENT PSYCHIATRY PROGRESS NOTE - NSBHFUPINTERVALHXFT_PSY_A_CORE
Pt compliant with medication and tolerating it well.  Chart reviewed, no events reported overnight.  Pt maintained on CO 1:1 for disorganization.  Pt. waxes and wanes, remains grossly disorganized but demonstrates improving memory and periods of orientation. Pt. denied physical complaints. Reports urinating normally, denies constipation. Denies A/H, V/H, S/i/p, H/i/p. CBC w/diff and CMP WNL.  MRI of brain w/ and w/out contrast ordered and completed, results pending. The pt. was able to recall some medical Hx to complete MRI form. Also verified Hx with patient mother and Dr. Yan who was seeing the patient for outpatient medical care recently. He reported no knowledge of surgical or medical Hx that may contraindicate MRI.

## 2022-06-16 NOTE — BH INPATIENT PSYCHIATRY PROGRESS NOTE - NSBHASSESSSUMMFT_PSY_ALL_CORE
51 yr old female, single, domiciled and unemployed.  currently, in [facility] for [facility]. other diagnoses as per Psyckes include: SAD and unspecified bipolar disorder.  hx of poor compliance to meds and psych aftercare.  Has had prior psych admissions; as per Psyckes: no other recent psych admissions.  Per chart review, there has been no documented hx of SA; has hx of cannabis abuse.  Pertinent medical issues include: anemia, HTN, breast cA s/p surgery (with ongoing oncologic care at [place]); hx of nontoxic goiter s/p thyroid surgery.  Today, presented to the ED BIB EMS after a bystander called 911 as Pt was undressing in public.    Improving linearity and memory observed, however, pt. is grossly disorganized, delusional, is waxing and waning; possible delirium related to medical etiology. MRI completed today, results pending. Labs listed below per neuro recommendation to be ordered Monday. Risperdal 1mg BID initiated for psychosis, excitatory catatonia ruled out. Begin to taper Ativan. D/C Seroquel, switch to Haldol PRN.    Neuro recs:  1.	MRI of brain with and without gadolinium   2.	Further bloodwork including PT/INR, aPTT, Syphilis screen, ESR, serum autoimmune encephalopathy panel ([facility] Test ID: ENS2), anti-cardiolipin, p-ANCA, c-ANCA, anti-TPO, anti-thyroglobulin, ADRIANNE, anti-ß2 microglobulin, anti-dsDNA, C3 level, and C4 level.  Will consult with lab to determine how to order recommended labs.       Plan:  Psychiatry:   	Depakote ER 1000mg HS  	Ativan 1.5mg TID  	MEDICATIONS  (PRN):  haloperidol     Tablet 5 milliGRAM(s) Oral every 6 hours PRN Agitation  haloperidol    Injectable 5 milliGRAM(s) IntraMuscular once PRN Severe agitation  LORazepam     Tablet 2 milliGRAM(s) Oral every 6 hours PRN Agitation  LORazepam   Injectable 2 milliGRAM(s) IntraMuscular Once PRN severe agitation  melatonin. 5 milliGRAM(s) Oral at bedtime PRN Insomnia  nicotine  Polacrilex Gum 4 milliGRAM(s) Oral every 2 hours PRN smoking cessation    Observations: constant observation  Legal status: 9.39 emergency  Medical: MRI pending results  	Hx of breast CA, thyroid mass: Tx at [facility]  	Hypokalemia: resolved  	UTI: Bactrim 160mg/800mg BID x5 days--completed  Dispo:pending

## 2022-06-16 NOTE — BH INPATIENT PSYCHIATRY PROGRESS NOTE - CURRENT MEDICATION
MEDICATIONS  (STANDING):  diVALproex ER 1000 milliGRAM(s) Oral at bedtime  LORazepam     Tablet 1.5 milliGRAM(s) Oral three times a day  multivitamin 1 Tablet(s) Oral daily  risperiDONE  Disintegrating Tablet 1 milliGRAM(s) Oral two times a day    MEDICATIONS  (PRN):  haloperidol     Tablet 5 milliGRAM(s) Oral every 6 hours PRN Agitation  haloperidol    Injectable 5 milliGRAM(s) IntraMuscular once PRN Severe agitation  LORazepam     Tablet 2 milliGRAM(s) Oral every 6 hours PRN Agitation  LORazepam   Injectable 2 milliGRAM(s) IntraMuscular Once PRN severe agitation  melatonin. 5 milliGRAM(s) Oral at bedtime PRN Insomnia  nicotine  Polacrilex Gum 4 milliGRAM(s) Oral every 2 hours PRN smoking cessation

## 2022-06-17 DIAGNOSIS — Z85.3 PERSONAL HISTORY OF MALIGNANT NEOPLASM OF BREAST: ICD-10-CM

## 2022-06-17 PROCEDURE — 99232 SBSQ HOSP IP/OBS MODERATE 35: CPT

## 2022-06-17 RX ORDER — RISPERIDONE 4 MG/1
1 TABLET ORAL ONCE
Refills: 0 | Status: COMPLETED | OUTPATIENT
Start: 2022-06-17 | End: 2022-06-17

## 2022-06-17 RX ORDER — RISPERIDONE 4 MG/1
2 TABLET ORAL AT BEDTIME
Refills: 0 | Status: DISCONTINUED | OUTPATIENT
Start: 2022-06-18 | End: 2022-06-23

## 2022-06-17 RX ORDER — RISPERIDONE 4 MG/1
1 TABLET ORAL DAILY
Refills: 0 | Status: DISCONTINUED | OUTPATIENT
Start: 2022-06-18 | End: 2022-06-20

## 2022-06-17 RX ORDER — ACETAMINOPHEN 500 MG
650 TABLET ORAL EVERY 6 HOURS
Refills: 0 | Status: DISCONTINUED | OUTPATIENT
Start: 2022-06-17 | End: 2022-06-28

## 2022-06-17 RX ADMIN — Medication 1.5 MILLIGRAM(S): at 13:00

## 2022-06-17 RX ADMIN — Medication 1 TABLET(S): at 08:57

## 2022-06-17 RX ADMIN — DIVALPROEX SODIUM 1000 MILLIGRAM(S): 500 TABLET, DELAYED RELEASE ORAL at 20:02

## 2022-06-17 RX ADMIN — Medication 2 MILLIGRAM(S): at 22:42

## 2022-06-17 RX ADMIN — Medication 1.5 MILLIGRAM(S): at 08:57

## 2022-06-17 RX ADMIN — RISPERIDONE 1 MILLIGRAM(S): 4 TABLET ORAL at 22:40

## 2022-06-17 RX ADMIN — RISPERIDONE 1 MILLIGRAM(S): 4 TABLET ORAL at 20:03

## 2022-06-17 RX ADMIN — RISPERIDONE 1 MILLIGRAM(S): 4 TABLET ORAL at 08:57

## 2022-06-17 RX ADMIN — Medication 5 MILLIGRAM(S): at 20:02

## 2022-06-17 RX ADMIN — Medication 1.5 MILLIGRAM(S): at 20:02

## 2022-06-17 NOTE — BH INPATIENT PSYCHIATRY PROGRESS NOTE - CURRENT MEDICATION
MEDICATIONS  (STANDING):  diVALproex ER 1000 milliGRAM(s) Oral at bedtime  LORazepam     Tablet 1.5 milliGRAM(s) Oral three times a day  multivitamin 1 Tablet(s) Oral daily  risperiDONE  Disintegrating Tablet 1 milliGRAM(s) Oral once    MEDICATIONS  (PRN):  acetaminophen     Tablet .. 650 milliGRAM(s) Oral every 6 hours PRN Severe Pain (7 - 10)  haloperidol     Tablet 5 milliGRAM(s) Oral every 6 hours PRN Agitation  haloperidol    Injectable 5 milliGRAM(s) IntraMuscular once PRN Severe agitation  LORazepam     Tablet 2 milliGRAM(s) Oral every 6 hours PRN Agitation  LORazepam   Injectable 2 milliGRAM(s) IntraMuscular Once PRN severe agitation  melatonin. 5 milliGRAM(s) Oral at bedtime PRN Insomnia  nicotine  Polacrilex Gum 4 milliGRAM(s) Oral every 2 hours PRN smoking cessation

## 2022-06-17 NOTE — BH INPATIENT PSYCHIATRY PROGRESS NOTE - NSBHASSESSSUMMFT_PSY_ALL_CORE
51 yr old female, single, domiciled and unemployed.  currently, in treatment at [facility] for bipolar disorder. other diagnoses as per Psyckes include: SAD and unspecified bipolar disorder.  hx of poor compliance to meds and psych aftercare.  Has had prior psych admissions; as per Psyckes: no other recent psych admissions.  Per chart review, there has been no documented hx of SA; has hx of cannabis abuse.  Pertinent medical issues include: anemia, HTN, breast cA s/p surgery (with ongoing oncologic care at [place]); hx of nontoxic goiter s/p thyroid surgery.  Today, presented to the ED BIB EMS after a bystander called 911 as Pt was undressing in public.    Improving linearity and memory observed, however, pt. remains grossly disorganized, delusional, notably waxing and waning. Likely benefit from risperidone given improvement since introduced. Possible AMS due to medical etiology. MRI inconclusive due to patient unable to tolerate completion. Labs listed below per neuro recommendation to be ordered Monday. Risperdal 1mg BID initiated for psychosis, excitatory catatonia ruled out. Plan to taper Ativan. D/C Seroquel, switch to Haldol PRN.    Neuro recs:  1.	MRI of brain with and without gadolinium   	Not completed because patient unable to tolerate completion of study. Indeterminate small hyperintense foci on DWI in left occipital. Unable to rule out metastases. MRI needs to be repeated.   2.	Further bloodwork including PT/INR, aPTT, Syphilis screen, ESR, serum autoimmune encephalopathy panel ([facility] Test ID: ENS2), anti-cardiolipin, p-ANCA, c-ANCA, anti-TPO, anti-thyroglobulin, ADRIANNE, anti-ß2 microglobulin, anti-dsDNA, C3 level, and C4 level.  Will consult with lab to determine how to order recommended labs.       Plan:  Psychiatry:   Depakote ER 1000mg HS for mood  Ativan 1.5mg TID for cj/agitation  Risperidone 1mg qAM + 2mg qhs for mood/psychosis  haloperidol     Tablet 5 milliGRAM(s) Oral every 6 hours PRN Agitation  haloperidol    Injectable 5 milliGRAM(s) IntraMuscular once PRN Severe agitation  LORazepam     Tablet 2 milliGRAM(s) Oral every 6 hours PRN Agitation  LORazepam   Injectable 2 milliGRAM(s) IntraMuscular Once PRN severe agitation  melatonin. 5 milliGRAM(s) Oral at bedtime PRN Insomnia  nicotine  Polacrilex Gum 4 milliGRAM(s) Oral every 2 hours PRN smoking cessation    Observations: constant observation  Legal status: 9.39 emergency  Medical: MRI pending results  	Hx of breast CA, thyroid mass: Tx at [facility]  	Hypokalemia: resolved  	UTI: Bactrim 160mg/800mg BID x5 days--completed  Dispo:pending

## 2022-06-17 NOTE — BH INPATIENT PSYCHIATRY PROGRESS NOTE - NSBHCHARTREVIEWVS_PSY_A_CORE FT
Vital Signs Last 24 Hrs  T(C): 36.4 (06-17-22 @ 20:04), Max: 36.4 (06-17-22 @ 06:07)  T(F): 97.5 (06-17-22 @ 20:04), Max: 97.6 (06-17-22 @ 06:07)  HR: --  BP: --  BP(mean): --  RR: --  SpO2: --    Orthostatic VS  06-17-22 @ 20:04  Lying BP: --/-- HR: --  Sitting BP: 110/77 HR: 89  Standing BP: 111/72 HR: 97  Site: --  Mode: --  Orthostatic VS  06-17-22 @ 06:07  Lying BP: --/-- HR: --  Sitting BP: 121/81 HR: 85  Standing BP: 114/72 HR: 106  Site: upper left arm  Mode: electronic  Orthostatic VS  06-16-22 @ 19:14  Lying BP: --/-- HR: --  Sitting BP: 120/62 HR: 101  Standing BP: --/-- HR: --  Site: --  Mode: --  Orthostatic VS  06-16-22 @ 11:05  Lying BP: --/-- HR: --  Sitting BP: 125/89 HR: 98  Standing BP: 126/82 HR: 101  Site: --  Mode: electronic

## 2022-06-17 NOTE — BH INPATIENT PSYCHIATRY PROGRESS NOTE - PRN MEDS
MEDICATIONS  (PRN):  acetaminophen     Tablet .. 650 milliGRAM(s) Oral every 6 hours PRN Severe Pain (7 - 10)  haloperidol     Tablet 5 milliGRAM(s) Oral every 6 hours PRN Agitation  haloperidol    Injectable 5 milliGRAM(s) IntraMuscular once PRN Severe agitation  LORazepam     Tablet 2 milliGRAM(s) Oral every 6 hours PRN Agitation  LORazepam   Injectable 2 milliGRAM(s) IntraMuscular Once PRN severe agitation  melatonin. 5 milliGRAM(s) Oral at bedtime PRN Insomnia  nicotine  Polacrilex Gum 4 milliGRAM(s) Oral every 2 hours PRN smoking cessation

## 2022-06-17 NOTE — BH INPATIENT PSYCHIATRY PROGRESS NOTE - NSBHFUPINTERVALHXFT_PSY_A_CORE
Pt compliant with medication and tolerating it well.  Chart reviewed, no events reported overnight.  Pt maintained on CO 1:1 for disorganization.  Sleep slightly improved. Slightly less restless and more linear speech. Recently started risperidone, titrating. Pt. waxes and wanes, remains grossly disorganized but demonstrates improving memory and periods of orientation. Pt. denies physical complaints. Reports urinating normally, denies constipation. When engaged in conversation, pt is sexually provocative, pressured, tangential, paranoid, initially more linear then increasingly tangential.     MRI performed yesterday though limited by patient interrupting study due to discomfort. Identified indeterminate small hyperintense foci on DWI images of occipital lobe. Unable to rule out metastases due to incomplete exam.

## 2022-06-18 PROCEDURE — 99232 SBSQ HOSP IP/OBS MODERATE 35: CPT | Mod: 1L

## 2022-06-18 RX ADMIN — RISPERIDONE 2 MILLIGRAM(S): 4 TABLET ORAL at 20:08

## 2022-06-18 RX ADMIN — DIVALPROEX SODIUM 1000 MILLIGRAM(S): 500 TABLET, DELAYED RELEASE ORAL at 20:08

## 2022-06-18 RX ADMIN — RISPERIDONE 1 MILLIGRAM(S): 4 TABLET ORAL at 08:11

## 2022-06-18 RX ADMIN — Medication 1.5 MILLIGRAM(S): at 08:03

## 2022-06-18 RX ADMIN — Medication 1 TABLET(S): at 08:03

## 2022-06-18 RX ADMIN — Medication 1.5 MILLIGRAM(S): at 11:39

## 2022-06-18 RX ADMIN — Medication 1.5 MILLIGRAM(S): at 20:08

## 2022-06-18 NOTE — BH INPATIENT PSYCHIATRY PROGRESS NOTE - NSBHCHARTREVIEWVS_PSY_A_CORE FT
Vital Signs Last 24 Hrs  T(C): 36.6 (06-18-22 @ 06:27), Max: 36.6 (06-18-22 @ 06:27)  T(F): 97.8 (06-18-22 @ 06:27), Max: 97.8 (06-18-22 @ 06:27)  HR: --  BP: --  BP(mean): --  RR: --  SpO2: --    Orthostatic VS  06-17-22 @ 20:04  Lying BP: --/-- HR: --  Sitting BP: 110/77 HR: 89  Standing BP: 111/72 HR: 97  Site: --  Mode: --  Orthostatic VS  06-17-22 @ 06:07  Lying BP: --/-- HR: --  Sitting BP: 121/81 HR: 85  Standing BP: 114/72 HR: 106  Site: upper left arm  Mode: electronic  Orthostatic VS  06-16-22 @ 19:14  Lying BP: --/-- HR: --  Sitting BP: 120/62 HR: 101  Standing BP: --/-- HR: --  Site: --  Mode: --  Orthostatic VS  06-16-22 @ 11:05  Lying BP: --/-- HR: --  Sitting BP: 125/89 HR: 98  Standing BP: 126/82 HR: 101  Site: --  Mode: electronic   Vital Signs Last 24 Hrs  T(C): 36.6 (06-18-22 @ 06:27), Max: 36.6 (06-18-22 @ 06:27)  T(F): 97.8 (06-18-22 @ 06:27), Max: 97.8 (06-18-22 @ 06:27)  HR: --  BP: --  BP(mean): --  RR: --  SpO2: --    Orthostatic VS  06-18-22 @ 07:59  Lying BP: --/-- HR: --  Sitting BP: 125/66 HR: 92  Standing BP: 111/69 HR: 96  Site: --  Mode: --  Orthostatic VS  06-17-22 @ 20:04  Lying BP: --/-- HR: --  Sitting BP: 110/77 HR: 89  Standing BP: 111/72 HR: 97  Site: --  Mode: --  Orthostatic VS  06-17-22 @ 06:07  Lying BP: --/-- HR: --  Sitting BP: 121/81 HR: 85  Standing BP: 114/72 HR: 106  Site: upper left arm  Mode: electronic  Orthostatic VS  06-16-22 @ 19:14  Lying BP: --/-- HR: --  Sitting BP: 120/62 HR: 101  Standing BP: --/-- HR: --  Site: --  Mode: --  Orthostatic VS  06-16-22 @ 11:05  Lying BP: --/-- HR: --  Sitting BP: 125/89 HR: 98  Standing BP: 126/82 HR: 101  Site: --  Mode: electronic

## 2022-06-18 NOTE — BH INPATIENT PSYCHIATRY PROGRESS NOTE - NSBHASSESSSUMMFT_PSY_ALL_CORE
51 yr old female, single, domiciled and unemployed.  currently, in treatment at [facility] for [facility] disorder. other diagnoses as per Psyckes include: SAD and unspecified bipolar disorder.  hx of poor compliance to meds and psych aftercare.  Has had prior psych admissions; as per Psyckes: no other recent psych admissions.  Per chart review, there has been no documented hx of SA; has hx of cannabis abuse.  Pertinent medical issues include: anemia, HTN, breast cA s/p surgery (with ongoing oncologic care at [place]); hx of nontoxic goiter s/p thyroid surgery.  Today, presented to the ED BIB EMS after a bystander called 911 as Pt was undressing in public.    Improving linearity and memory observed, however, pt. remains grossly disorganized, delusional, notably waxing and waning. Likely benefit from risperidone given improvement since introduced. Possible AMS due to medical etiology. MRI inconclusive due to patient unable to tolerate completion. Labs listed below per neuro recommendation to be ordered Monday. Risperdal 1mg BID initiated for psychosis, excitatory catatonia ruled out. Plan to taper Ativan. D/C Seroquel, switch to Haldol PRN.    Neuro recs:  1.	MRI of brain with and without gadolinium   	Not completed because patient unable to tolerate completion of study. Indeterminate small hyperintense foci on DWI in left occipital. Unable to rule out metastases. MRI needs to be repeated.   2.	Further bloodwork including PT/INR, aPTT, Syphilis screen, ESR, serum autoimmune encephalopathy panel ([facility] Test ID: ENS2), anti-cardiolipin, p-ANCA, c-ANCA, anti-TPO, anti-thyroglobulin, ADRIANNE, anti-ß2 microglobulin, anti-dsDNA, C3 level, and C4 level.  Will consult with lab to determine how to order recommended labs.       Plan:  Psychiatry:   Depakote ER 1000mg HS for mood  Ativan 1.5mg TID for cj/agitation  Risperidone 1mg qAM + 2mg qhs for mood/psychosis  haloperidol     Tablet 5 milliGRAM(s) Oral every 6 hours PRN Agitation  haloperidol    Injectable 5 milliGRAM(s) IntraMuscular once PRN Severe agitation  LORazepam     Tablet 2 milliGRAM(s) Oral every 6 hours PRN Agitation  LORazepam   Injectable 2 milliGRAM(s) IntraMuscular Once PRN severe agitation  melatonin. 5 milliGRAM(s) Oral at bedtime PRN Insomnia  nicotine  Polacrilex Gum 4 milliGRAM(s) Oral every 2 hours PRN smoking cessation    Observations: constant observation  Legal status: 9.39 emergency  Medical: MRI pending results  	Hx of breast CA, thyroid mass: Tx at [facility]  	Hypokalemia: resolved  	UTI: Bactrim 160mg/800mg BID x5 days--completed  Dispo:pending

## 2022-06-18 NOTE — BH INPATIENT PSYCHIATRY PROGRESS NOTE - NSBHFUPINTERVALHXFT_PSY_A_CORE
MRI performed yesterday though limited by patient interrupting study due to discomfort. Identified indeterminate small hyperintense foci on DWI images of occipital lobe. Unable to rule out metastases due to incomplete exam.  Patient is followed up for psychosis and excited catatonia. Chart, medications and labs reviewed.  Patient is discussed with nursing staff. No significant overnight issues.  Per nursing report patient remains compliant with all standing medications and tolerating well. No akithisia, EPS, or tremors. Patient remains in tenuous behavioral control, but slightly more redirectable. No mention of sleep disturbances or appetite concerns.   Patient is observed in in hallway accompanied by CO. Patient reports mood as “ok” Patient remains irritable, labile, argumentative and disorganized. Patient is illogical, impaired TP. Patient continues to need po prn’s.  Symptoms continues to impact pts functionality. Patient is encouraged to continue medication regimen.  Self- care is adequate.  Continue to monitor and provide therapeutic support. No acute medical concerns, VSS.

## 2022-06-18 NOTE — BH INPATIENT PSYCHIATRY PROGRESS NOTE - CURRENT MEDICATION
MEDICATIONS  (STANDING):  diVALproex ER 1000 milliGRAM(s) Oral at bedtime  LORazepam     Tablet 1.5 milliGRAM(s) Oral three times a day  multivitamin 1 Tablet(s) Oral daily  risperiDONE  Disintegrating Tablet 1 milliGRAM(s) Oral daily  risperiDONE  Disintegrating Tablet 2 milliGRAM(s) Oral at bedtime    MEDICATIONS  (PRN):  acetaminophen     Tablet .. 650 milliGRAM(s) Oral every 6 hours PRN Severe Pain (7 - 10)  haloperidol     Tablet 5 milliGRAM(s) Oral every 6 hours PRN Agitation  haloperidol    Injectable 5 milliGRAM(s) IntraMuscular once PRN Severe agitation  LORazepam     Tablet 2 milliGRAM(s) Oral every 6 hours PRN Agitation  LORazepam   Injectable 2 milliGRAM(s) IntraMuscular Once PRN severe agitation  melatonin. 5 milliGRAM(s) Oral at bedtime PRN Insomnia  nicotine  Polacrilex Gum 4 milliGRAM(s) Oral every 2 hours PRN smoking cessation

## 2022-06-19 PROCEDURE — 99232 SBSQ HOSP IP/OBS MODERATE 35: CPT

## 2022-06-19 RX ADMIN — DIVALPROEX SODIUM 1000 MILLIGRAM(S): 500 TABLET, DELAYED RELEASE ORAL at 20:11

## 2022-06-19 RX ADMIN — RISPERIDONE 2 MILLIGRAM(S): 4 TABLET ORAL at 20:12

## 2022-06-19 RX ADMIN — Medication 1 TABLET(S): at 08:24

## 2022-06-19 RX ADMIN — Medication 1.5 MILLIGRAM(S): at 08:24

## 2022-06-19 RX ADMIN — Medication 5 MILLIGRAM(S): at 20:12

## 2022-06-19 RX ADMIN — RISPERIDONE 1 MILLIGRAM(S): 4 TABLET ORAL at 08:24

## 2022-06-19 RX ADMIN — Medication 1.5 MILLIGRAM(S): at 12:39

## 2022-06-19 RX ADMIN — Medication 4 MILLIGRAM(S): at 17:55

## 2022-06-19 RX ADMIN — Medication 1.5 MILLIGRAM(S): at 20:11

## 2022-06-19 RX ADMIN — HALOPERIDOL DECANOATE 5 MILLIGRAM(S): 100 INJECTION INTRAMUSCULAR at 20:12

## 2022-06-19 NOTE — BH INPATIENT PSYCHIATRY PROGRESS NOTE - NSBHFUPINTERVALHXFT_PSY_A_CORE
Patient is followed up for psychosis and excited catatonia. Chart, medications and labs reviewed.  Patient is discussed with nursing staff. No significant overnight issues.  Per nursing report patient remains compliant with all standing medications and tolerating well. No akithisia, EPS, or tremors. Patient ‘s behavioral control more redirectable, she is calmer. No mention of sleep disturbances or appetite concerns.   Patient is observed in hallway accompanied by CO. Patient reports mood as “good” Patient reports family has dropped off her personal clothes and is asking to have her clothes.  Patient reports being cold on the unit. Patient is needing po prn’s less and less. Remains disorganized. Denies SI/HI. Patient is encouraged to continue medication regimen.  Self- care is adequate.  Continue to monitor and provide therapeutic support. No acute medical concerns, VSS.

## 2022-06-19 NOTE — BH INPATIENT PSYCHIATRY PROGRESS NOTE - NSBHASSESSSUMMFT_PSY_ALL_CORE
51 yr old female, single, domiciled and unemployed.  currently, in treatment at [facility] for [facility] disorder. other diagnoses as per Psyckes include: SAD and unspecified bipolar disorder.  hx of poor compliance to meds and psych aftercare.  Has had prior psych admissions; as per Psyckes: no other recent psych admissions.  Per chart review, there has been no documented hx of SA; has hx of cannabis abuse.  Pertinent medical issues include: anemia, HTN, breast cA s/p surgery (with ongoing oncologic care at [place]); hx of nontoxic goiter s/p thyroid surgery.  Today, presented to the ED BIB EMS after a bystander called 911 as Pt was undressing in public.    Improving linearity and memory observed, however, pt. remains grossly disorganized, delusional, notably waxing and waning. Likely benefit from risperidone given improvement since introduced. Possible AMS due to medical etiology. MRI inconclusive due to patient unable to tolerate completion. Labs listed below per neuro recommendation to be ordered Monday. Risperdal 1mg BID initiated for psychosis, excitatory catatonia ruled out. Plan to taper Ativan. D/C Seroquel, switch to Haldol PRN.    Neuro recs:  1.	MRI of brain with and without gadolinium   	Not completed because patient unable to tolerate completion of study. Indeterminate small hyperintense foci on DWI in left occipital. Unable to rule out metastases. MRI needs to be repeated.   2.	Further bloodwork including PT/INR, aPTT, Syphilis screen, ESR, serum autoimmune encephalopathy panel ([facility] Test ID: ENS2), anti-cardiolipin, p-ANCA, c-ANCA, anti-TPO, anti-thyroglobulin, ADRIANNE, anti-ß2 microglobulin, anti-dsDNA, C3 level, and C4 level.  Will consult with lab to determine how to order recommended labs.       Plan:  Psychiatry:   Depakote ER 1000mg HS for mood  Ativan 1.5mg TID for cj/agitation  Risperidone 1mg qAM + 2mg qhs for mood/psychosis  haloperidol     Tablet 5 milliGRAM(s) Oral every 6 hours PRN Agitation  haloperidol    Injectable 5 milliGRAM(s) IntraMuscular once PRN Severe agitation  LORazepam     Tablet 2 milliGRAM(s) Oral every 6 hours PRN Agitation  LORazepam   Injectable 2 milliGRAM(s) IntraMuscular Once PRN severe agitation  melatonin. 5 milliGRAM(s) Oral at bedtime PRN Insomnia  nicotine  Polacrilex Gum 4 milliGRAM(s) Oral every 2 hours PRN smoking cessation    Observations: constant observation  Legal status: 9.39 emergency  Medical: MRI pending results  	Hx of breast CA, thyroid mass: Tx at [facility]  	Hypokalemia: resolved  	UTI: Bactrim 160mg/800mg BID x5 days--completed  Dispo:pending

## 2022-06-19 NOTE — BH INPATIENT PSYCHIATRY PROGRESS NOTE - NSBHATTESTSEENBY_PSY_A_CORE
NP without Attending Psychiatrist
attending Psychiatrist without NP/Trainee
NP without Attending Psychiatrist

## 2022-06-19 NOTE — BH INPATIENT PSYCHIATRY PROGRESS NOTE - NSBHINPTBILLING_PSY_ALL_CORE
69374 - Inpatient Moderate Complexity
01172 - Inpatient Moderate Complexity
75929 - Inpatient Moderate Complexity
38106 - Inpatient Moderate Complexity
70514 - Inpatient Moderate Complexity
76049 - Inpatient Moderate Complexity
39865 - Inpatient Moderate Complexity
57071 - Inpatient Moderate Complexity
78797 - Inpatient Moderate Complexity
75774 - Inpatient Moderate Complexity
91403 - Inpatient Moderate Complexity
07171 - Inpatient Moderate Complexity

## 2022-06-19 NOTE — BH INPATIENT PSYCHIATRY PROGRESS NOTE - NSBHCHARTREVIEWVS_PSY_A_CORE FT
Vital Signs Last 24 Hrs  T(C): 36.8 (06-19-22 @ 05:39), Max: 36.8 (06-19-22 @ 05:39)  T(F): 98.2 (06-19-22 @ 05:39), Max: 98.2 (06-19-22 @ 05:39)  HR: --  BP: --  BP(mean): --  RR: --  SpO2: --    Orthostatic VS  06-19-22 @ 05:39  Lying BP: --/-- HR: --  Sitting BP: 101/66 HR: 86  Standing BP: 110/69 HR: 106  Site: --  Mode: --  Orthostatic VS  06-18-22 @ 19:40  Lying BP: --/-- HR: --  Sitting BP: 105/69 HR: 90  Standing BP: 102/68 HR: 96  Site: --  Mode: --  Orthostatic VS  06-18-22 @ 07:59  Lying BP: --/-- HR: --  Sitting BP: 125/66 HR: 92  Standing BP: 111/69 HR: 96  Site: --  Mode: --  Orthostatic VS  06-17-22 @ 20:04  Lying BP: --/-- HR: --  Sitting BP: 110/77 HR: 89  Standing BP: 111/72 HR: 97  Site: --  Mode: --   Vital Signs Last 24 Hrs  T(C): 36.8 (06-19-22 @ 08:25), Max: 36.8 (06-19-22 @ 05:39)  T(F): 98.2 (06-19-22 @ 08:25), Max: 98.2 (06-19-22 @ 05:39)  HR: --  BP: --  BP(mean): --  RR: --  SpO2: --    Orthostatic VS  06-19-22 @ 08:25  Lying BP: --/-- HR: --  Sitting BP: 101/66 HR: 86  Standing BP: 110/69 HR: 106  Site: --  Mode: --  Orthostatic VS  06-19-22 @ 05:39  Lying BP: --/-- HR: --  Sitting BP: 101/66 HR: 86  Standing BP: 110/69 HR: 106  Site: --  Mode: --  Orthostatic VS  06-18-22 @ 19:40  Lying BP: --/-- HR: --  Sitting BP: 105/69 HR: 90  Standing BP: 102/68 HR: 96  Site: --  Mode: --  Orthostatic VS  06-18-22 @ 07:59  Lying BP: --/-- HR: --  Sitting BP: 125/66 HR: 92  Standing BP: 111/69 HR: 96  Site: --  Mode: --  Orthostatic VS  06-17-22 @ 20:04  Lying BP: --/-- HR: --  Sitting BP: 110/77 HR: 89  Standing BP: 111/72 HR: 97  Site: --  Mode: --

## 2022-06-20 LAB
APTT BLD: 28.6 SEC — SIGNIFICANT CHANGE UP (ref 27–36.3)
ERYTHROCYTE [SEDIMENTATION RATE] IN BLOOD: 39 MM/HR — HIGH (ref 4–25)
INR BLD: 1.08 RATIO — SIGNIFICANT CHANGE UP (ref 0.88–1.16)
PROTHROM AB SERPL-ACNC: 12.5 SEC — SIGNIFICANT CHANGE UP (ref 10.5–13.4)
T PALLIDUM AB TITR SER: NEGATIVE — SIGNIFICANT CHANGE UP

## 2022-06-20 PROCEDURE — 99232 SBSQ HOSP IP/OBS MODERATE 35: CPT

## 2022-06-20 RX ORDER — RISPERIDONE 4 MG/1
2 TABLET ORAL DAILY
Refills: 0 | Status: DISCONTINUED | OUTPATIENT
Start: 2022-06-21 | End: 2022-06-28

## 2022-06-20 RX ADMIN — DIVALPROEX SODIUM 1000 MILLIGRAM(S): 500 TABLET, DELAYED RELEASE ORAL at 20:07

## 2022-06-20 RX ADMIN — Medication 1.5 MILLIGRAM(S): at 12:10

## 2022-06-20 RX ADMIN — Medication 1 TABLET(S): at 08:00

## 2022-06-20 RX ADMIN — RISPERIDONE 2 MILLIGRAM(S): 4 TABLET ORAL at 20:07

## 2022-06-20 RX ADMIN — RISPERIDONE 1 MILLIGRAM(S): 4 TABLET ORAL at 08:01

## 2022-06-20 RX ADMIN — Medication 1.5 MILLIGRAM(S): at 20:07

## 2022-06-20 RX ADMIN — Medication 5 MILLIGRAM(S): at 20:07

## 2022-06-20 RX ADMIN — Medication 1.5 MILLIGRAM(S): at 08:00

## 2022-06-20 NOTE — BH INPATIENT PSYCHIATRY PROGRESS NOTE - NSBHCHARTREVIEWVS_PSY_A_CORE FT
Vital Signs Last 24 Hrs  T(C): 37.1 (06-23-22 @ 19:46), Max: 37.1 (06-23-22 @ 19:46)  T(F): 98.7 (06-23-22 @ 19:46), Max: 98.7 (06-23-22 @ 19:46)  HR: --  BP: --  BP(mean): --  RR: --  SpO2: --    Orthostatic VS  06-23-22 @ 19:46  Lying BP: --/-- HR: --  Sitting BP: 102/72 HR: 89  Standing BP: 116/68 HR: 100  Site: --  Mode: --  Orthostatic VS  06-23-22 @ 08:17  Lying BP: --/-- HR: --  Sitting BP: 104/64 HR: 74  Standing BP: 100/60 HR: 82  Site: --  Mode: --  Orthostatic VS  06-22-22 @ 19:39  Lying BP: --/-- HR: --  Sitting BP: 116/72 HR: 96  Standing BP: 118/76 HR: 87  Site: --  Mode: --  Orthostatic VS  06-22-22 @ 08:30  Lying BP: --/-- HR: --  Sitting BP: 113/70 HR: 83  Standing BP: 100/67 HR: 90  Site: --  Mode: --

## 2022-06-20 NOTE — BH INPATIENT PSYCHIATRY PROGRESS NOTE - NSBHFUPINTERVALHXFT_PSY_A_CORE
Patient is followed up for psychosis and excited catatonia. Chart, medications and labs reviewed.  Patient is discussed with nursing staff. No significant overnight issues.  Per nursing report patient remains compliant with all standing medications and tolerating well. No akithisia, EPS, or tremors. Patient ‘s behavioral control more redirectable, she is calmer. No mention of sleep disturbances or appetite concerns.   Patient is observed in hallway accompanied by CO. Patient reports mood as “good.” Patient reports family has dropped off her personal clothes and is asking to have her clothes.  Patient reports being cold on the unit. Patient is needing po prn’s less and less. Remains disorganized. Denies SI/HI. Patient is encouraged to continue medication regimen.  Self- care is adequate.  Continue to monitor and provide therapeutic support. No acute medical concerns, VSS.

## 2022-06-20 NOTE — BH INPATIENT PSYCHIATRY PROGRESS NOTE - CURRENT MEDICATION
MEDICATIONS  (STANDING):  diVALproex ER 1000 milliGRAM(s) Oral at bedtime  LORazepam     Tablet 1.5 milliGRAM(s) Oral three times a day  multivitamin 1 Tablet(s) Oral daily  risperiDONE  Disintegrating Tablet 3 milliGRAM(s) Oral at bedtime  risperiDONE  Disintegrating Tablet 2 milliGRAM(s) Oral daily    MEDICATIONS  (PRN):  acetaminophen     Tablet .. 650 milliGRAM(s) Oral every 6 hours PRN Severe Pain (7 - 10)  haloperidol     Tablet 5 milliGRAM(s) Oral every 6 hours PRN Agitation  haloperidol    Injectable 5 milliGRAM(s) IntraMuscular once PRN Severe agitation  LORazepam     Tablet 2 milliGRAM(s) Oral every 6 hours PRN Agitation  LORazepam   Injectable 2 milliGRAM(s) IntraMuscular Once PRN severe agitation  melatonin. 5 milliGRAM(s) Oral at bedtime PRN Insomnia  nicotine  Polacrilex Gum 4 milliGRAM(s) Oral every 2 hours PRN smoking cessation

## 2022-06-20 NOTE — BH INPATIENT PSYCHIATRY PROGRESS NOTE - NSBHASSESSSUMMFT_PSY_ALL_CORE
51 yr old female, single, domiciled and unemployed.  currently, [facility]. other diagnoses as per Psyckes include: SAD and unspecified bipolar disorder.  hx of poor compliance to meds and psych aftercare.  Has had prior psych admissions; as per Psyckes: no other recent psych admissions.  Per chart review, there has been no documented hx of SA; has hx of cannabis abuse.  Pertinent medical issues include: anemia, HTN, breast cA s/p surgery (with ongoing oncologic care at [place]); hx of nontoxic goiter s/p thyroid surgery.  Today, presented to the ED BIB EMS after a bystander called 911 as Pt was undressing in public.    Improving linearity and memory observed, however, pt. remains grossly disorganized, delusional, notably waxing and waning. Likely benefit from risperidone given improvement since introduced. Possible AMS due to medical etiology. MRI inconclusive due to patient unable to tolerate completion. Labs listed below per neuro recommendation to be ordered Monday. Risperdal 1mg BID initiated for psychosis, excitatory catatonia ruled out. Plan to taper Ativan. D/C Seroquel, switch to Haldol PRN.    Neuro recs:  1.	MRI of brain with and without gadolinium   	Not completed because patient unable to tolerate completion of study. Indeterminate small hyperintense foci on DWI in left occipital. Unable to rule out metastases. MRI needs to be repeated.   2.	Further bloodwork including PT/INR, aPTT, Syphilis screen, ESR, serum autoimmune encephalopathy panel ([facility] Test ID: ENS2), anti-cardiolipin, p-ANCA, c-ANCA, anti-TPO, anti-thyroglobulin, ADRIANNE, anti-ß2 microglobulin, anti-dsDNA, C3 level, and C4 level.  Will consult with lab to determine how to order recommended labs.       Plan:  Psychiatry:   Depakote ER 1000mg HS for mood  Ativan 1.5mg TID for cj/agitation  Risperidone 1mg qAM + 2mg qhs for mood/psychosis  haloperidol     Tablet 5 milliGRAM(s) Oral every 6 hours PRN Agitation  haloperidol    Injectable 5 milliGRAM(s) IntraMuscular once PRN Severe agitation  LORazepam     Tablet 2 milliGRAM(s) Oral every 6 hours PRN Agitation  LORazepam   Injectable 2 milliGRAM(s) IntraMuscular Once PRN severe agitation  melatonin. 5 milliGRAM(s) Oral at bedtime PRN Insomnia  nicotine  Polacrilex Gum 4 milliGRAM(s) Oral every 2 hours PRN smoking cessation    Observations: constant observation  Legal status: 9.39 emergency  Medical: MRI pending results  	Hx of breast CA, thyroid mass: Tx at [facility]  	Hypokalemia: resolved  	UTI: Bactrim 160mg/800mg BID x5 days--completed  Dispo:pending

## 2022-06-21 LAB
CARDIOLIPIN AB SER-ACNC: POSITIVE
CARDIOLIPIN IGM SER-MCNC: 23 MPL — HIGH (ref 0–12.5)
CARDIOLIPIN IGM SER-MCNC: 23.3 GPL — HIGH (ref 0–12.5)
DEPRECATED CARDIOLIPIN IGA SER: 5.4 APL — SIGNIFICANT CHANGE UP (ref 0–12.5)

## 2022-06-21 PROCEDURE — 99232 SBSQ HOSP IP/OBS MODERATE 35: CPT

## 2022-06-21 RX ADMIN — DIVALPROEX SODIUM 1000 MILLIGRAM(S): 500 TABLET, DELAYED RELEASE ORAL at 20:08

## 2022-06-21 RX ADMIN — RISPERIDONE 2 MILLIGRAM(S): 4 TABLET ORAL at 08:15

## 2022-06-21 RX ADMIN — Medication 1.5 MILLIGRAM(S): at 12:50

## 2022-06-21 RX ADMIN — RISPERIDONE 2 MILLIGRAM(S): 4 TABLET ORAL at 20:07

## 2022-06-21 RX ADMIN — Medication 5 MILLIGRAM(S): at 20:07

## 2022-06-21 RX ADMIN — Medication 1 TABLET(S): at 08:15

## 2022-06-21 RX ADMIN — Medication 1.5 MILLIGRAM(S): at 08:15

## 2022-06-21 RX ADMIN — Medication 1.5 MILLIGRAM(S): at 20:07

## 2022-06-21 NOTE — BH INPATIENT PSYCHIATRY PROGRESS NOTE - NSBHFUPINTERVALHXFT_PSY_A_CORE
Patient is followed up for psychosis and excited catatonia. Chart, medications and labs reviewed.  Patient is discussed with nursing staff. No significant overnight issues.  Per nursing report patient remains compliant with all standing medications and tolerating well. No akithisia, EPS, or tremors. Patient ‘s behavioral control more redirectable, she is calmer. No mention of sleep disturbances or appetite concerns.     Patient is observed in hallway accompanied by CO. Patient reports mood as “good.” Continues to ask about her clothing, given limited amount in context of recent elopement attempt. Requires fewer PRNs. Remains disorganized though less than before. Denies SI/HI. Patient is encouraged to continue medication regimen.  Self- care is adequate.  Continue to monitor and provide therapeutic support. No acute medical concerns, VSS.

## 2022-06-21 NOTE — BH INPATIENT PSYCHIATRY PROGRESS NOTE - NSBHASSESSSUMMFT_PSY_ALL_CORE
51 yr old female, single, domiciled and unemployed.  currently, in treatment at [facility] for [facility] disorder. other diagnoses as per Psyckes include: SAD and unspecified bipolar disorder.  hx of poor compliance to meds and psych aftercare.  Has had prior psych admissions; as per Psyckes: no other recent psych admissions.  Per chart review, there has been no documented hx of SA; has hx of cannabis abuse.  Pertinent medical issues include: anemia, HTN, breast cA s/p surgery (with ongoing oncologic care at [place]); hx of nontoxic goiter s/p thyroid surgery.  Today, presented to the ED BIB EMS after a bystander called 911 as Pt was undressing in public.    Improving linearity and memory observed, however, pt. remains grossly disorganized, delusional, notably waxing and waning. Likely benefit from risperidone given improvement since introduced. Possible AMS due to medical etiology. MRI inconclusive due to patient unable to tolerate completion. Labs listed below per neuro recommendation to be ordered Monday. Risperdal 1mg BID initiated for psychosis, excitatory catatonia ruled out. Plan to taper Ativan. D/C Seroquel, switch to Haldol PRN.    Neuro recs:  1.	MRI of brain with and without gadolinium   	Not completed because patient unable to tolerate completion of study. Indeterminate small hyperintense foci on DWI in left occipital. Unable to rule out metastases. MRI needs to be repeated.   2.	Further bloodwork including PT/INR, aPTT, Syphilis screen, ESR, serum autoimmune encephalopathy panel ([facility] Test ID: ENS2), anti-cardiolipin, p-ANCA, c-ANCA, anti-TPO, anti-thyroglobulin, ADRIANNE, anti-ß2 microglobulin, anti-dsDNA, C3 level, and C4 level.  Will consult with lab to determine how to order recommended labs.       Plan:  Psychiatry:   Depakote ER 1000mg HS for mood  Ativan 1.5mg TID for cj/agitation  Risperidone 2mg qAM + 2mg qhs for mood/psychosis  haloperidol     Tablet 5 milliGRAM(s) Oral every 6 hours PRN Agitation  haloperidol    Injectable 5 milliGRAM(s) IntraMuscular once PRN Severe agitation  LORazepam     Tablet 2 milliGRAM(s) Oral every 6 hours PRN Agitation  LORazepam   Injectable 2 milliGRAM(s) IntraMuscular Once PRN severe agitation  melatonin. 5 milliGRAM(s) Oral at bedtime PRN Insomnia  nicotine  Polacrilex Gum 4 milliGRAM(s) Oral every 2 hours PRN smoking cessation    Observations: constant observation  Legal status: 9.39 emergency  Medical: MRI pending results  	Hx of breast CA, thyroid mass: Tx at [facility]  	Hypokalemia: resolved  	UTI: Bactrim 160mg/800mg BID x5 days--completed  Dispo:pending

## 2022-06-21 NOTE — BH INPATIENT PSYCHIATRY PROGRESS NOTE - CURRENT MEDICATION
MEDICATIONS  (STANDING):  diVALproex ER 1000 milliGRAM(s) Oral at bedtime  LORazepam     Tablet 1.5 milliGRAM(s) Oral three times a day  multivitamin 1 Tablet(s) Oral daily  risperiDONE  Disintegrating Tablet 2 milliGRAM(s) Oral daily  risperiDONE  Disintegrating Tablet 3 milliGRAM(s) Oral at bedtime    MEDICATIONS  (PRN):  acetaminophen     Tablet .. 650 milliGRAM(s) Oral every 6 hours PRN Severe Pain (7 - 10)  haloperidol     Tablet 5 milliGRAM(s) Oral every 6 hours PRN Agitation  haloperidol    Injectable 5 milliGRAM(s) IntraMuscular once PRN Severe agitation  LORazepam     Tablet 2 milliGRAM(s) Oral every 6 hours PRN Agitation  LORazepam   Injectable 2 milliGRAM(s) IntraMuscular Once PRN severe agitation  melatonin. 5 milliGRAM(s) Oral at bedtime PRN Insomnia  nicotine  Polacrilex Gum 4 milliGRAM(s) Oral every 2 hours PRN smoking cessation

## 2022-06-22 PROCEDURE — 99232 SBSQ HOSP IP/OBS MODERATE 35: CPT

## 2022-06-22 RX ADMIN — RISPERIDONE 2 MILLIGRAM(S): 4 TABLET ORAL at 20:06

## 2022-06-22 RX ADMIN — RISPERIDONE 2 MILLIGRAM(S): 4 TABLET ORAL at 08:37

## 2022-06-22 RX ADMIN — Medication 5 MILLIGRAM(S): at 20:05

## 2022-06-22 RX ADMIN — DIVALPROEX SODIUM 1000 MILLIGRAM(S): 500 TABLET, DELAYED RELEASE ORAL at 20:05

## 2022-06-22 RX ADMIN — Medication 1 TABLET(S): at 08:37

## 2022-06-22 RX ADMIN — Medication 1.5 MILLIGRAM(S): at 20:05

## 2022-06-22 RX ADMIN — Medication 1.5 MILLIGRAM(S): at 08:38

## 2022-06-22 RX ADMIN — Medication 1.5 MILLIGRAM(S): at 13:42

## 2022-06-22 NOTE — BH INPATIENT PSYCHIATRY PROGRESS NOTE - NSBHASSESSSUMMFT_PSY_ALL_CORE
51 yr old female, single, domiciled and unemployed.  currently, in treatment at [facility] for [facility] disorder. other diagnoses as per Psyckes include: SAD and unspecified bipolar disorder.  hx of poor compliance to meds and psych aftercare.  Has had prior psych admissions; as per Psyckes: no other recent psych admissions.  Per chart review, there has been no documented hx of SA; has hx of cannabis abuse.  Pertinent medical issues include: anemia, HTN, breast cA s/p surgery (with ongoing oncologic care at [place]); hx of nontoxic goiter s/p thyroid surgery.  Today, presented to the ED BIB EMS after a bystander called 911 as Pt was undressing in public.    Improving linearity and memory observed, however, pt. remains grossly disorganized, delusional, notably waxing and waning. Likely benefit from risperidone given improvement since introduced. Possible AMS due to medical etiology. MRI inconclusive due to patient unable to tolerate completion. Labs listed below per neuro recommendation to be ordered Monday. Risperdal 1mg BID initiated for psychosis, excitatory catatonia ruled out. Plan to taper Ativan. D/C Seroquel, switch to Haldol PRN.    Neuro recs:  1.	MRI of brain with and without gadolinium   	Not completed because patient unable to tolerate completion of study. Indeterminate small hyperintense foci on DWI in left occipital. Unable to rule out metastases. MRI needs to be repeated.   2.	Further bloodwork including PT/INR, aPTT, Syphilis screen, ESR, serum autoimmune encephalopathy panel ([facility] Test ID: ENS2), anti-cardiolipin, p-ANCA, c-ANCA, anti-TPO, anti-thyroglobulin, ADRIANNE, anti-ß2 microglobulin, anti-dsDNA, C3 level, and C4 level.  Will consult with lab to determine how to order recommended labs.       Plan:  Psychiatry:   Depakote ER 1000mg HS for mood  Ativan 1.5mg TID for cj/agitation  Risperidone 2mg qAM + 2mg qhs for mood/psychosis  haloperidol     Tablet 5 milliGRAM(s) Oral every 6 hours PRN Agitation  haloperidol    Injectable 5 milliGRAM(s) IntraMuscular once PRN Severe agitation  LORazepam     Tablet 2 milliGRAM(s) Oral every 6 hours PRN Agitation  LORazepam   Injectable 2 milliGRAM(s) IntraMuscular Once PRN severe agitation  melatonin. 5 milliGRAM(s) Oral at bedtime PRN Insomnia  nicotine  Polacrilex Gum 4 milliGRAM(s) Oral every 2 hours PRN smoking cessation    Observations: constant observation  Legal status: 9.39 emergency  Medical: MRI pending results  	Hx of breast CA, thyroid mass: Tx at [facility]  	Hypokalemia: resolved  	UTI: Bactrim 160mg/800mg BID x5 days--completed  Dispo:pending

## 2022-06-22 NOTE — BH INPATIENT PSYCHIATRY PROGRESS NOTE - NSBHFUPINTERVALHXFT_PSY_A_CORE
Patient is followed up for psychosis and excited catatonia. Chart, medications and labs reviewed.  Patient is discussed with nursing staff. No significant overnight issues.  Per nursing report patient remains compliant with all standing medications and tolerating well. No akithisia, EPS, or tremors. Patient ‘s behavioral control more redirectable, she is calmer. No mention of sleep disturbances or appetite concerns.     Patient is observed in hallway accompanied by CO. Patient reports mood as “good.” Continues to ask about her clothing, given limited amount in context of recent elopement attempt. Requires fewer PRNs. Remains disorganized though less than before. Denies SI/HI. Patient is encouraged to continue medication regimen.  Self- care is adequate.  Continue to monitor and provide therapeutic support. No acute medical concerns, VSS.  Patient is followed up for psychosis and excited catatonia. Chart, medications and labs reviewed.  Patient is discussed with nursing staff. No significant overnight issues.  Per nursing report patient remains compliant with all standing medications and tolerating well. No akithisia, EPS, or tremors. Patient ‘s behavioral control more redirectable, she is calmer. No mention of sleep disturbances or appetite concerns.     Patient is observed in hallway accompanied by CO. Patient reports mood as “good.” Continues to ask about her clothing, given limited amount in context of recent elopement attempt. Allowed to go outside. Requires fewer PRNs. Less disorganized than before. Provocative and hypersexual in flirting. Denies SI/HI. Patient is encouraged to continue medication regimen.  Self- care is adequate.  Continue to monitor and provide therapeutic support. No acute medical concerns, VSS.

## 2022-06-23 PROCEDURE — 99232 SBSQ HOSP IP/OBS MODERATE 35: CPT

## 2022-06-23 RX ORDER — RISPERIDONE 4 MG/1
3 TABLET ORAL AT BEDTIME
Refills: 0 | Status: DISCONTINUED | OUTPATIENT
Start: 2022-06-23 | End: 2022-06-24

## 2022-06-23 RX ADMIN — DIVALPROEX SODIUM 1000 MILLIGRAM(S): 500 TABLET, DELAYED RELEASE ORAL at 20:41

## 2022-06-23 RX ADMIN — Medication 1.5 MILLIGRAM(S): at 13:01

## 2022-06-23 RX ADMIN — Medication 2 MILLIGRAM(S): at 17:53

## 2022-06-23 RX ADMIN — HALOPERIDOL DECANOATE 5 MILLIGRAM(S): 100 INJECTION INTRAMUSCULAR at 17:52

## 2022-06-23 RX ADMIN — Medication 1 TABLET(S): at 08:54

## 2022-06-23 RX ADMIN — Medication 650 MILLIGRAM(S): at 10:44

## 2022-06-23 RX ADMIN — RISPERIDONE 3 MILLIGRAM(S): 4 TABLET ORAL at 20:40

## 2022-06-23 RX ADMIN — Medication 1.5 MILLIGRAM(S): at 08:55

## 2022-06-23 RX ADMIN — RISPERIDONE 2 MILLIGRAM(S): 4 TABLET ORAL at 08:54

## 2022-06-23 RX ADMIN — Medication 5 MILLIGRAM(S): at 20:41

## 2022-06-23 RX ADMIN — Medication 1.5 MILLIGRAM(S): at 20:41

## 2022-06-23 NOTE — BH INPATIENT PSYCHIATRY PROGRESS NOTE - NSBHCHARTREVIEWVS_PSY_A_CORE FT
Vital Signs Last 24 Hrs  T(C): 36.1 (06-23-22 @ 06:19), Max: 36.9 (06-22-22 @ 22:27)  T(F): 96.9 (06-23-22 @ 06:19), Max: 98.4 (06-22-22 @ 22:27)  HR: --  BP: --  BP(mean): --  RR: --  SpO2: --    Orthostatic VS  06-23-22 @ 08:17  Lying BP: --/-- HR: --  Sitting BP: 104/64 HR: 74  Standing BP: 100/60 HR: 82  Site: --  Mode: --  Orthostatic VS  06-22-22 @ 19:39  Lying BP: --/-- HR: --  Sitting BP: 116/72 HR: 96  Standing BP: 118/76 HR: 87  Site: --  Mode: --  Orthostatic VS  06-22-22 @ 08:30  Lying BP: --/-- HR: --  Sitting BP: 113/70 HR: 83  Standing BP: 100/67 HR: 90  Site: --  Mode: --  Orthostatic VS  06-21-22 @ 19:41  Lying BP: --/-- HR: --  Sitting BP: 128/80 HR: 90  Standing BP: 129/75 HR: 93  Site: --  Mode: --   Vital Signs Last 24 Hrs  T(C): 37.1 (06-23-22 @ 19:46), Max: 37.1 (06-23-22 @ 19:46)  T(F): 98.7 (06-23-22 @ 19:46), Max: 98.7 (06-23-22 @ 19:46)  HR: --  BP: --  BP(mean): --  RR: --  SpO2: --    Orthostatic VS  06-23-22 @ 19:46  Lying BP: --/-- HR: --  Sitting BP: 102/72 HR: 89  Standing BP: 116/68 HR: 100  Site: --  Mode: --  Orthostatic VS  06-23-22 @ 08:17  Lying BP: --/-- HR: --  Sitting BP: 104/64 HR: 74  Standing BP: 100/60 HR: 82  Site: --  Mode: --  Orthostatic VS  06-22-22 @ 19:39  Lying BP: --/-- HR: --  Sitting BP: 116/72 HR: 96  Standing BP: 118/76 HR: 87  Site: --  Mode: --  Orthostatic VS  06-22-22 @ 08:30  Lying BP: --/-- HR: --  Sitting BP: 113/70 HR: 83  Standing BP: 100/67 HR: 90  Site: --  Mode: --

## 2022-06-23 NOTE — BH INPATIENT PSYCHIATRY PROGRESS NOTE - NSBHASSESSSUMMFT_PSY_ALL_CORE
51 yr old female, single, domiciled and unemployed.  currently, in treatment at [facility] for [facility] disorder. other diagnoses as per Psyckes include: SAD and unspecified bipolar disorder.  hx of poor compliance to meds and psych aftercare.  Has had prior psych admissions; as per Psyckes: no other recent psych admissions.  Per chart review, there has been no documented hx of SA; has hx of cannabis abuse.  Pertinent medical issues include: anemia, HTN, breast cA s/p surgery (with ongoing oncologic care at [place]); hx of nontoxic goiter s/p thyroid surgery.  Today, presented to the ED BIB EMS after a bystander called 911 as Pt was undressing in public.    Improving linearity and memory observed. Less disorganized. Continues hypersexual. Waxing and waning. Benefit from risperidone trial/titration. R/o AMS due to medical etiology. Will repeat MRI due to patient's initial inability to complete. Risperdal 2mg qAM + 3mg qhs for cj/psychosis, excitatory catatonia ruled out. Plan to slowly taper Ativan. D/C Seroquel, switch to Haldol PRN.    Neuro recs:  1.	MRI of brain with and without gadolinium   	Not completed because patient unable to tolerate completion of study. Indeterminate small hyperintense foci on DWI in left occipital. Unable to rule out metastases. MRI needs to be repeated.   2.	Further bloodwork including PT/INR, aPTT, Syphilis screen, ESR, serum autoimmune encephalopathy panel ([facility] Test ID: ENS2), anti-cardiolipin, p-ANCA, c-ANCA, anti-TPO, anti-thyroglobulin, ADRIANNE, anti-ß2 microglobulin, anti-dsDNA, C3 level, and C4 level.  Will consult with lab to determine how to order recommended labs.       Plan:  Psychiatry:   Depakote ER 1000mg HS for mood  Ativan 1.5mg TID for cj/agitation  Risperidone 2mg qAM + 3mg qhs for mood/psychosis  haloperidol     Tablet 5 milliGRAM(s) Oral every 6 hours PRN Agitation  haloperidol    Injectable 5 milliGRAM(s) IntraMuscular once PRN Severe agitation  LORazepam     Tablet 2 milliGRAM(s) Oral every 6 hours PRN Agitation  LORazepam   Injectable 2 milliGRAM(s) IntraMuscular Once PRN severe agitation  melatonin. 5 milliGRAM(s) Oral at bedtime PRN Insomnia  nicotine  Polacrilex Gum 4 milliGRAM(s) Oral every 2 hours PRN smoking cessation    Observations: constant observation  Legal status: 9.39 emergency  Medical: MRI pending results  	Hx of breast CA, thyroid mass: Tx at [facility]  	Hypokalemia: resolved  	UTI: Bactrim 160mg/800mg BID x5 days--completed  Dispo:pending

## 2022-06-23 NOTE — BH INPATIENT PSYCHIATRY PROGRESS NOTE - CURRENT MEDICATION
MEDICATIONS  (STANDING):  diVALproex ER 1000 milliGRAM(s) Oral at bedtime  LORazepam     Tablet 1.5 milliGRAM(s) Oral three times a day  multivitamin 1 Tablet(s) Oral daily  risperiDONE  Disintegrating Tablet 2 milliGRAM(s) Oral daily  risperiDONE  Disintegrating Tablet 2 milliGRAM(s) Oral at bedtime    MEDICATIONS  (PRN):  acetaminophen     Tablet .. 650 milliGRAM(s) Oral every 6 hours PRN Severe Pain (7 - 10)  haloperidol     Tablet 5 milliGRAM(s) Oral every 6 hours PRN Agitation  haloperidol    Injectable 5 milliGRAM(s) IntraMuscular once PRN Severe agitation  LORazepam     Tablet 2 milliGRAM(s) Oral every 6 hours PRN Agitation  LORazepam   Injectable 2 milliGRAM(s) IntraMuscular Once PRN severe agitation  melatonin. 5 milliGRAM(s) Oral at bedtime PRN Insomnia  nicotine  Polacrilex Gum 4 milliGRAM(s) Oral every 2 hours PRN smoking cessation   MEDICATIONS  (STANDING):  diVALproex ER 1000 milliGRAM(s) Oral at bedtime  LORazepam     Tablet 1.5 milliGRAM(s) Oral three times a day  multivitamin 1 Tablet(s) Oral daily  risperiDONE  Disintegrating Tablet 2 milliGRAM(s) Oral daily  risperiDONE  Disintegrating Tablet 3 milliGRAM(s) Oral at bedtime    MEDICATIONS  (PRN):  acetaminophen     Tablet .. 650 milliGRAM(s) Oral every 6 hours PRN Severe Pain (7 - 10)  haloperidol     Tablet 5 milliGRAM(s) Oral every 6 hours PRN Agitation  haloperidol    Injectable 5 milliGRAM(s) IntraMuscular once PRN Severe agitation  LORazepam     Tablet 2 milliGRAM(s) Oral every 6 hours PRN Agitation  LORazepam   Injectable 2 milliGRAM(s) IntraMuscular Once PRN severe agitation  melatonin. 5 milliGRAM(s) Oral at bedtime PRN Insomnia  nicotine  Polacrilex Gum 4 milliGRAM(s) Oral every 2 hours PRN smoking cessation

## 2022-06-23 NOTE — BH INPATIENT PSYCHIATRY PROGRESS NOTE - NSBHFUPINTERVALHXFT_PSY_A_CORE
Patient is followed up for psychosis and excited catatonia. Chart, medications and labs reviewed.  Patient is discussed with nursing staff. No significant overnight issues.  Per nursing report patient remains compliant with all standing medications and tolerating well. No akithisia, EPS, or tremors. Patient ‘s behavioral control more redirectable, she is calmer. No mention of sleep disturbances or appetite concerns.     Patient is observed in hallway accompanied by CO. Patient reports mood as “good.” Continues to ask about her clothing, given limited amount in context of recent elopement attempt. Allowed to go outside. Requires fewer PRNs. Less disorganized than before. Provocative and hypersexual in flirting. Denies SI/HI. Patient is encouraged to continue medication regimen.  Self- care is adequate.  Continue to monitor and provide therapeutic support. No acute medical concerns, VSS.

## 2022-06-24 PROCEDURE — 99232 SBSQ HOSP IP/OBS MODERATE 35: CPT

## 2022-06-24 RX ORDER — RISPERIDONE 4 MG/1
4 TABLET ORAL AT BEDTIME
Refills: 0 | Status: DISCONTINUED | OUTPATIENT
Start: 2022-06-24 | End: 2022-06-28

## 2022-06-24 RX ORDER — BENZTROPINE MESYLATE 1 MG
1 TABLET ORAL AT BEDTIME
Refills: 0 | Status: DISCONTINUED | OUTPATIENT
Start: 2022-06-24 | End: 2022-06-28

## 2022-06-24 RX ORDER — PALIPERIDONE 1.5 MG/1
156 TABLET, EXTENDED RELEASE ORAL
Qty: 1 | Refills: 0
Start: 2022-06-24 | End: 2022-07-23

## 2022-06-24 RX ADMIN — Medication 1.5 MILLIGRAM(S): at 08:35

## 2022-06-24 RX ADMIN — DIVALPROEX SODIUM 1000 MILLIGRAM(S): 500 TABLET, DELAYED RELEASE ORAL at 19:47

## 2022-06-24 RX ADMIN — Medication 1.5 MILLIGRAM(S): at 12:34

## 2022-06-24 RX ADMIN — RISPERIDONE 2 MILLIGRAM(S): 4 TABLET ORAL at 08:36

## 2022-06-24 RX ADMIN — Medication 1.5 MILLIGRAM(S): at 19:47

## 2022-06-24 RX ADMIN — Medication 1 MILLIGRAM(S): at 19:47

## 2022-06-24 RX ADMIN — Medication 1 TABLET(S): at 08:36

## 2022-06-24 RX ADMIN — RISPERIDONE 4 MILLIGRAM(S): 4 TABLET ORAL at 19:47

## 2022-06-24 RX ADMIN — Medication 5 MILLIGRAM(S): at 19:48

## 2022-06-24 NOTE — BH INPATIENT PSYCHIATRY PROGRESS NOTE - NSBHCHARTREVIEWVS_PSY_A_CORE FT
Vital Signs Last 24 Hrs  T(C): 36.4 (06-24-22 @ 14:13), Max: 37.1 (06-23-22 @ 19:46)  T(F): 97.5 (06-24-22 @ 14:13), Max: 98.7 (06-23-22 @ 19:46)  HR: --  BP: --  BP(mean): --  RR: --  SpO2: --    Orthostatic VS  06-24-22 @ 07:56  Lying BP: --/-- HR: --  Sitting BP: 112/68 HR: 90  Standing BP: 98/67 HR: 97  Site: --  Mode: electronic  Orthostatic VS  06-23-22 @ 19:46  Lying BP: --/-- HR: --  Sitting BP: 102/72 HR: 89  Standing BP: 116/68 HR: 100  Site: --  Mode: --  Orthostatic VS  06-23-22 @ 08:17  Lying BP: --/-- HR: --  Sitting BP: 104/64 HR: 74  Standing BP: 100/60 HR: 82  Site: --  Mode: --  Orthostatic VS  06-22-22 @ 19:39  Lying BP: --/-- HR: --  Sitting BP: 116/72 HR: 96  Standing BP: 118/76 HR: 87  Site: --  Mode: --

## 2022-06-24 NOTE — BH INPATIENT PSYCHIATRY PROGRESS NOTE - NSBHFUPINTERVALHXFT_PSY_A_CORE
Patient is followed up for cj with psychosis and concern for excited catatonia. Chart, medications and labs reviewed.  Patient is discussed with nursing staff. No significant overnight issues.  Per nursing report patient remains compliant with all standing medications and tolerating well. No akithisia, EPS, or tremors. Patient ‘s behavioral control more redirectable, she is calmer. No mention of sleep disturbances or appetite concerns.     Patient is observed in hallway. Patient reports mood as “good.” Appreciates having more of her clothing back. Allowed to go outside. Evening PRNs. Continues less disorganized than before. Provocative and hypersexual in flirting. Denies SI/HI. Patient is encouraged to continue medication regimen. Finds medication helpful. Discussed and agreed to antipsychotic FULTON. Self- care is adequate. Continue to monitor and provide therapeutic support. No acute medical concerns, VSS, f/u repeat MRI and labs.

## 2022-06-24 NOTE — BH INPATIENT PSYCHIATRY PROGRESS NOTE - NSBHASSESSSUMMFT_PSY_ALL_CORE
51 yr old female, single, domiciled and unemployed.  currently, in [facility] for [facility]. other diagnoses as per Psyckes include: SAD and unspecified bipolar disorder.  hx of poor compliance to meds and psych aftercare.  Has had prior psych admissions; as per Psyckes: no other recent psych admissions.  Per chart review, there has been no documented hx of SA; has hx of cannabis abuse.  Pertinent medical issues include: anemia, HTN, breast cA s/p surgery (with ongoing oncologic care at [place]); hx of nontoxic goiter s/p thyroid surgery.  Today, presented to the ED BIB EMS after a bystander called 911 as Pt was undressing in public.    Improving linearity and memory observed. Less disorganized. Continues hypersexual. Waxing and waning. Benefit from risperidone trial/titration. R/o AMS due to medical etiology. Will repeat MRI due to patient's initial inability to complete. Risperdal 2mg qAM + 3mg qhs for cj/psychosis, excitatory catatonia ruled out. Plan to slowly taper Ativan. D/C Seroquel, switch to Haldol PRN.    Neuro recs:  1.	MRI of brain with and without gadolinium 6/27  	Not completed because patient unable to tolerate completion of study. Indeterminate small hyperintense foci on DWI in left occipital. Unable to rule out metastases.   2.	Further bloodwork including PT/INR, aPTT, Syphilis screen, ESR, serum autoimmune encephalopathy panel ([facility] Test ID: ENS2), anti-cardiolipin, p-ANCA, c-ANCA, anti-TPO, anti-thyroglobulin, ADRIANNE, anti-ß2 microglobulin, anti-dsDNA, C3 level, and C4 level.  Will consult with lab to determine how to order recommended labs.       Plan:  Psychiatry:   Depakote ER 1000mg HS for mood  Ativan 1.5mg TID for cj/agitation  Risperidone 2mg qAM + 4mg qhs for mood/psychosis  	Obtaining prior authorization for FULTON  haloperidol     Tablet 5 milliGRAM(s) Oral every 6 hours PRN Agitation  haloperidol    Injectable 5 milliGRAM(s) IntraMuscular once PRN Severe agitation  LORazepam     Tablet 2 milliGRAM(s) Oral every 6 hours PRN Agitation  LORazepam   Injectable 2 milliGRAM(s) IntraMuscular Once PRN severe agitation  melatonin. 5 milliGRAM(s) Oral at bedtime PRN Insomnia  nicotine  Polacrilex Gum 4 milliGRAM(s) Oral every 2 hours PRN smoking cessation    Observations: constant observation  Medical: MRI pending results  	Hx of breast CA, thyroid mass: Tx at [facility]  	Hypokalemia: resolved  	UTI: Bactrim 160mg/800mg BID x5 days--completed  Dispo:pending

## 2022-06-25 PROCEDURE — 99232 SBSQ HOSP IP/OBS MODERATE 35: CPT

## 2022-06-25 RX ADMIN — Medication 1.5 MILLIGRAM(S): at 20:01

## 2022-06-25 RX ADMIN — Medication 5 MILLIGRAM(S): at 20:02

## 2022-06-25 RX ADMIN — Medication 1.5 MILLIGRAM(S): at 13:23

## 2022-06-25 RX ADMIN — Medication 1.5 MILLIGRAM(S): at 08:26

## 2022-06-25 RX ADMIN — RISPERIDONE 4 MILLIGRAM(S): 4 TABLET ORAL at 20:01

## 2022-06-25 RX ADMIN — Medication 1 TABLET(S): at 08:25

## 2022-06-25 RX ADMIN — DIVALPROEX SODIUM 1000 MILLIGRAM(S): 500 TABLET, DELAYED RELEASE ORAL at 20:01

## 2022-06-25 RX ADMIN — Medication 1 MILLIGRAM(S): at 20:01

## 2022-06-25 RX ADMIN — RISPERIDONE 2 MILLIGRAM(S): 4 TABLET ORAL at 08:26

## 2022-06-25 RX ADMIN — Medication 650 MILLIGRAM(S): at 14:47

## 2022-06-25 NOTE — BH INPATIENT PSYCHIATRY PROGRESS NOTE - NSBHASSESSSUMMFT_PSY_ALL_CORE
51 yr old female, single, domiciled and unemployed.  currently, in treatment at [facility] for bipolar disorder. other diagnoses as per Psyckes include: SAD and unspecified bipolar disorder.  hx of poor compliance to meds and psych aftercare.  Has had prior psych admissions; as per Psyckes: no other recent psych admissions.  Per chart review, there has been no documented hx of SA; has hx of cannabis abuse.  Pertinent medical issues include: anemia, HTN, breast cA s/p surgery (with ongoing oncologic care at [place]); hx of nontoxic goiter s/p thyroid surgery.  Today, presented to the ED BIB EMS after a bystander called 911 as Pt was undressing in public.    Improving linearity and memory observed. Less disorganized. Continues hypersexual. Waxing and waning. Benefit from risperidone trial/titration. R/o AMS due to medical etiology. Will repeat MRI due to patient's initial inability to complete. Risperdal 2mg qAM + 3mg qhs for cj/psychosis, excitatory catatonia ruled out. Plan to slowly taper Ativan. D/C Seroquel, switch to Haldol PRN.    Neuro recs:  1.	MRI of brain with and without gadolinium 6/27  	Not completed because patient unable to tolerate completion of study. Indeterminate small hyperintense foci on DWI in left occipital. Unable to rule out metastases.   2.	Further bloodwork including PT/INR, aPTT, Syphilis screen, ESR, serum autoimmune encephalopathy panel ([facility] Test ID: ENS2), anti-cardiolipin, p-ANCA, c-ANCA, anti-TPO, anti-thyroglobulin, ADRIANNE, anti-ß2 microglobulin, anti-dsDNA, C3 level, and C4 level.  Will consult with lab to determine how to order recommended labs.       Plan:  Psychiatry:   Depakote ER 1000mg HS for mood  Ativan 1.5mg TID for cj/agitation  Risperidone 2mg qAM + 4mg qhs for mood/psychosis  	Obtaining prior authorization for FULTON  haloperidol     Tablet 5 milliGRAM(s) Oral every 6 hours PRN Agitation  haloperidol    Injectable 5 milliGRAM(s) IntraMuscular once PRN Severe agitation  LORazepam     Tablet 2 milliGRAM(s) Oral every 6 hours PRN Agitation  LORazepam   Injectable 2 milliGRAM(s) IntraMuscular Once PRN severe agitation  melatonin. 5 milliGRAM(s) Oral at bedtime PRN Insomnia  nicotine  Polacrilex Gum 4 milliGRAM(s) Oral every 2 hours PRN smoking cessation    Observations: constant observation  Medical: MRI pending results  	Hx of breast CA, thyroid mass: Tx at [facility]  	Hypokalemia: resolved  	UTI: Bactrim 160mg/800mg BID x5 days--completed  Dispo:pending

## 2022-06-25 NOTE — BH INPATIENT PSYCHIATRY PROGRESS NOTE - NSBHFUPINTERVALHXFT_PSY_A_CORE
Patient is followed up for psychosis and excited catatonia. Chart, medications and labs reviewed.  Patient is discussed with nursing staff. No significant overnight issues.  Per nursing report patient remains compliant with all standing medications and tolerating well. No akithisia, EPS, or tremors. Patient ‘s behavioral control has been more stable, calmer more redirectable. Patient is not needing po prn’s as frequently as  before. No mention of sleep disturbances or appetite concerns.   She remains on CO 3-11pm only. Patient reports mood as “ok.”  Remains disorganized, psychotic symptoms not as prominent. Denies SI/SIB/HI, no plan or intent, engages in safety planning. Patient is encouraged to continue medication regimen.  Self- care is adequate.  Continue to monitor and provide therapeutic support. No acute medical concerns, VSS.

## 2022-06-25 NOTE — BH INPATIENT PSYCHIATRY PROGRESS NOTE - NSBHCHARTREVIEWVS_PSY_A_CORE FT
Vital Signs Last 24 Hrs  T(C): 35.9 (06-25-22 @ 06:13), Max: 36.9 (06-24-22 @ 19:32)  T(F): 96.7 (06-25-22 @ 06:13), Max: 98.4 (06-24-22 @ 19:32)  HR: --  BP: --  BP(mean): --  RR: --  SpO2: --    Orthostatic VS  06-25-22 @ 08:40  Lying BP: --/-- HR: --  Sitting BP: 98/60 HR: 97  Standing BP: 91/67 HR: 112  Site: --  Mode: electronic  Orthostatic VS  06-24-22 @ 19:32  Lying BP: --/-- HR: --  Sitting BP: 115/75 HR: 85  Standing BP: 120/73 HR: 75  Site: --  Mode: --  Orthostatic VS  06-24-22 @ 07:56  Lying BP: --/-- HR: --  Sitting BP: 112/68 HR: 90  Standing BP: 98/67 HR: 97  Site: --  Mode: electronic  Orthostatic VS  06-23-22 @ 19:46  Lying BP: --/-- HR: --  Sitting BP: 102/72 HR: 89  Standing BP: 116/68 HR: 100  Site: --  Mode: --

## 2022-06-25 NOTE — BH INPATIENT PSYCHIATRY PROGRESS NOTE - CURRENT MEDICATION
MEDICATIONS  (STANDING):  benztropine 1 milliGRAM(s) Oral at bedtime  diVALproex ER 1000 milliGRAM(s) Oral at bedtime  LORazepam     Tablet 1.5 milliGRAM(s) Oral three times a day  multivitamin 1 Tablet(s) Oral daily  risperiDONE  Disintegrating Tablet 2 milliGRAM(s) Oral daily  risperiDONE  Disintegrating Tablet 4 milliGRAM(s) Oral at bedtime    MEDICATIONS  (PRN):  acetaminophen     Tablet .. 650 milliGRAM(s) Oral every 6 hours PRN Severe Pain (7 - 10)  haloperidol     Tablet 5 milliGRAM(s) Oral every 6 hours PRN Agitation  haloperidol    Injectable 5 milliGRAM(s) IntraMuscular once PRN Severe agitation  LORazepam     Tablet 2 milliGRAM(s) Oral every 6 hours PRN Agitation  LORazepam   Injectable 2 milliGRAM(s) IntraMuscular Once PRN severe agitation  melatonin. 5 milliGRAM(s) Oral at bedtime PRN Insomnia  nicotine  Polacrilex Gum 4 milliGRAM(s) Oral every 2 hours PRN smoking cessation

## 2022-06-26 PROCEDURE — 99232 SBSQ HOSP IP/OBS MODERATE 35: CPT

## 2022-06-26 RX ADMIN — RISPERIDONE 2 MILLIGRAM(S): 4 TABLET ORAL at 08:23

## 2022-06-26 RX ADMIN — Medication 1.5 MILLIGRAM(S): at 19:51

## 2022-06-26 RX ADMIN — Medication 1 MILLIGRAM(S): at 19:51

## 2022-06-26 RX ADMIN — RISPERIDONE 4 MILLIGRAM(S): 4 TABLET ORAL at 19:51

## 2022-06-26 RX ADMIN — Medication 1.5 MILLIGRAM(S): at 08:23

## 2022-06-26 RX ADMIN — Medication 1 TABLET(S): at 08:23

## 2022-06-26 RX ADMIN — DIVALPROEX SODIUM 1000 MILLIGRAM(S): 500 TABLET, DELAYED RELEASE ORAL at 19:51

## 2022-06-26 RX ADMIN — Medication 1.5 MILLIGRAM(S): at 12:56

## 2022-06-26 NOTE — BH INPATIENT PSYCHIATRY PROGRESS NOTE - NSBHASSESSSUMMFT_PSY_ALL_CORE
51 yr old female, single, domiciled and unemployed.  currently, in treatment at [facility] for bipolar disorder. other diagnoses as per Psyckes include: SAD and unspecified bipolar disorder.  hx of poor compliance to meds and psych aftercare.  Has had prior psych admissions; as per Psyckes: no other recent psych admissions.  Per chart review, there has been no documented hx of SA; has hx of cannabis abuse.  Pertinent medical issues include: anemia, HTN, breast cA s/p surgery (with ongoing oncologic care at [place]); hx of nontoxic goiter s/p thyroid surgery.  Today, presented to the ED BIB EMS after a bystander called 911 as Pt was undressing in public.    Improving linearity and memory observed. Less disorganized. Continues hypersexual. Waxing and waning. Benefit from risperidone trial/titration. R/o AMS due to medical etiology. Will repeat MRI due to patient's initial inability to complete. Risperdal 2mg qAM + 3mg qhs for cj/psychosis, excitatory catatonia ruled out. Plan to slowly taper Ativan. D/C Seroquel, switch to Haldol PRN.    Neuro recs:  1.	MRI of brain with and without gadolinium 6/27  	Not completed because patient unable to tolerate completion of study. Indeterminate small hyperintense foci on DWI in left occipital. Unable to rule out metastases.   2.	Further bloodwork including PT/INR, aPTT, Syphilis screen, ESR, serum autoimmune encephalopathy panel ([facility] Test ID: ENS2), anti-cardiolipin, p-ANCA, c-ANCA, anti-TPO, anti-thyroglobulin, ADRIANNE, anti-ß2 microglobulin, anti-dsDNA, C3 level, and C4 level.  Will consult with lab to determine how to order recommended labs.       Plan:  Psychiatry:   Depakote ER 1000mg HS for mood  Ativan 1.5mg TID for cj/agitation  Risperidone 2mg qAM + 4mg qhs for mood/psychosis  	Obtaining prior authorization for FULTON  haloperidol     Tablet 5 milliGRAM(s) Oral every 6 hours PRN Agitation  haloperidol    Injectable 5 milliGRAM(s) IntraMuscular once PRN Severe agitation  LORazepam     Tablet 2 milliGRAM(s) Oral every 6 hours PRN Agitation  LORazepam   Injectable 2 milliGRAM(s) IntraMuscular Once PRN severe agitation  melatonin. 5 milliGRAM(s) Oral at bedtime PRN Insomnia  nicotine  Polacrilex Gum 4 milliGRAM(s) Oral every 2 hours PRN smoking cessation    Observations: constant observation  Medical: MRI pending results  	Hx of breast CA, thyroid mass: Tx at [facility]  	Hypokalemia: resolved  	UTI: Bactrim 160mg/800mg BID x5 days--completed  Dispo:pending

## 2022-06-26 NOTE — BH INPATIENT PSYCHIATRY PROGRESS NOTE - CURRENT MEDICATION
MEDICATIONS  (STANDING):  benztropine 1 milliGRAM(s) Oral at bedtime  diVALproex ER 1000 milliGRAM(s) Oral at bedtime  LORazepam     Tablet 1.5 milliGRAM(s) Oral three times a day  multivitamin 1 Tablet(s) Oral daily  risperiDONE  Disintegrating Tablet 4 milliGRAM(s) Oral at bedtime  risperiDONE  Disintegrating Tablet 2 milliGRAM(s) Oral daily    MEDICATIONS  (PRN):  acetaminophen     Tablet .. 650 milliGRAM(s) Oral every 6 hours PRN Severe Pain (7 - 10)  haloperidol     Tablet 5 milliGRAM(s) Oral every 6 hours PRN Agitation  haloperidol    Injectable 5 milliGRAM(s) IntraMuscular once PRN Severe agitation  LORazepam     Tablet 2 milliGRAM(s) Oral every 6 hours PRN Agitation  LORazepam   Injectable 2 milliGRAM(s) IntraMuscular Once PRN severe agitation  melatonin. 5 milliGRAM(s) Oral at bedtime PRN Insomnia  nicotine  Polacrilex Gum 4 milliGRAM(s) Oral every 2 hours PRN smoking cessation

## 2022-06-26 NOTE — BH INPATIENT PSYCHIATRY PROGRESS NOTE - NSBHCHARTREVIEWVS_PSY_A_CORE FT
Vital Signs Last 24 Hrs  T(C): 36.2 (06-25-22 @ 22:28), Max: 36.7 (06-25-22 @ 19:00)  T(F): 97.2 (06-25-22 @ 22:28), Max: 98.1 (06-25-22 @ 19:00)  HR: --  BP: --  BP(mean): --  RR: 18 (06-25-22 @ 15:09) (18 - 18)  SpO2: --    Orthostatic VS  06-25-22 @ 19:00  Lying BP: --/-- HR: --  Sitting BP: 117/65 HR: 95  Standing BP: 115/75 HR: 101  Site: --  Mode: electronic  Orthostatic VS  06-25-22 @ 08:40  Lying BP: --/-- HR: --  Sitting BP: 98/60 HR: 97  Standing BP: 91/67 HR: 112  Site: --  Mode: electronic  Orthostatic VS  06-24-22 @ 19:32  Lying BP: --/-- HR: --  Sitting BP: 115/75 HR: 85  Standing BP: 120/73 HR: 75  Site: --  Mode: --

## 2022-06-26 NOTE — BH INPATIENT PSYCHIATRY PROGRESS NOTE - NSBHFUPINTERVALHXFT_PSY_A_CORE
Patient is followed up for psychosis and excited catatonia. Chart, medications and labs reviewed.  Patient is discussed with nursing staff. No significant overnight issues.  Per nursing report patient remains compliant with all standing medications and tolerating well. Patient ‘s behavioral control has been more stable, pleasant, calmer more redirectable. Patient is not needing po prn’s as frequently as before. No mention of sleep disturbances or appetite concerns.   Patient is observed in hallway, she remains on CO 3-11pm only. Patient reports mood as “ok.”  Reports she had a visit from her boyfriend yesterday, visit went well. Remains disorganized, psychotic symptoms not as prominent. Denies SI/SIB/HI, no plan or intent, engages in safety planning. Patient is encouraged to continue medication regimen.  Self- care is adequate.  Continue to monitor and provide therapeutic support. No acute medical concerns, Patient has MRI appointment in the morning, VSS.

## 2022-06-27 LAB — B2 GLYCOPROT1 AB SER QL: POSITIVE

## 2022-06-27 PROCEDURE — 70553 MRI BRAIN STEM W/O & W/DYE: CPT | Mod: 26

## 2022-06-27 PROCEDURE — 99232 SBSQ HOSP IP/OBS MODERATE 35: CPT

## 2022-06-27 RX ADMIN — Medication 1.5 MILLIGRAM(S): at 08:07

## 2022-06-27 RX ADMIN — Medication 1 TABLET(S): at 08:08

## 2022-06-27 RX ADMIN — Medication 1.5 MILLIGRAM(S): at 20:13

## 2022-06-27 RX ADMIN — DIVALPROEX SODIUM 1000 MILLIGRAM(S): 500 TABLET, DELAYED RELEASE ORAL at 20:12

## 2022-06-27 RX ADMIN — RISPERIDONE 4 MILLIGRAM(S): 4 TABLET ORAL at 20:13

## 2022-06-27 RX ADMIN — Medication 5 MILLIGRAM(S): at 20:13

## 2022-06-27 RX ADMIN — RISPERIDONE 2 MILLIGRAM(S): 4 TABLET ORAL at 08:07

## 2022-06-27 RX ADMIN — HALOPERIDOL DECANOATE 5 MILLIGRAM(S): 100 INJECTION INTRAMUSCULAR at 15:45

## 2022-06-27 RX ADMIN — Medication 2 MILLIGRAM(S): at 15:45

## 2022-06-27 RX ADMIN — Medication 1 MILLIGRAM(S): at 20:13

## 2022-06-27 RX ADMIN — Medication 1.5 MILLIGRAM(S): at 13:01

## 2022-06-27 NOTE — BH INPATIENT PSYCHIATRY PROGRESS NOTE - NSBHFUPINTERVALHXFT_PSY_A_CORE
Patient is followed up for psychosis and excited catatonia. Chart, medications and labs reviewed.  Patient is discussed with nursing staff.  Per nursing report no interval events.  She remains compliant with all standing medications and tolerating well. Patient ‘s behavioral control has been more stable, calmer, and appropriate.  No mention of sleep disturbances or appetite concerns.   Patient is observed in dayroom bright affect, Patient reports mood as “ok.” Less disorganized, psychotic symptoms not as prominent. Denies SI/SIB/HI, no plan or intent, engages in safety planning. Patient is encouraged to continue medication regimen.  Self- care is adequate.  Continue to monitor and provide therapeutic support. No acute medical concerns, Patient has an MRI appointment today at 4pm,  VSS.  Patient will be taken off CO today.

## 2022-06-27 NOTE — BH INPATIENT PSYCHIATRY PROGRESS NOTE - NSBHCHARTREVIEWVS_PSY_A_CORE FT
Vital Signs Last 24 Hrs  T(C): 36.8 (06-27-22 @ 07:27), Max: 36.9 (06-26-22 @ 22:24)  T(F): 98.2 (06-27-22 @ 07:27), Max: 98.4 (06-26-22 @ 22:24)  HR: --  BP: --  BP(mean): --  RR: --  SpO2: --    Orthostatic VS  06-27-22 @ 07:27  Lying BP: --/-- HR: --  Sitting BP: 117/75 HR: 93  Standing BP: 107/67 HR: 102  Site: --  Mode: --  Orthostatic VS  06-26-22 @ 20:12  Lying BP: --/-- HR: --  Sitting BP: 105/60 HR: 97  Standing BP: 111/74 HR: 101  Site: --  Mode: --  Orthostatic VS  06-26-22 @ 08:45  Lying BP: --/-- HR: --  Sitting BP: 111/72 HR: 98  Standing BP: 99/71 HR: 107  Site: --  Mode: electronic  Orthostatic VS  06-25-22 @ 19:00  Lying BP: --/-- HR: --  Sitting BP: 117/65 HR: 95  Standing BP: 115/75 HR: 101  Site: --  Mode: electronic

## 2022-06-27 NOTE — BH INPATIENT PSYCHIATRY PROGRESS NOTE - NSBHMSESPABN_PSY_A_CORE
Increased productivity/Impaired articulation
Increased productivity/Impaired articulation
Impaired articulation
Increased productivity/Impaired articulation
Impaired articulation
Increased productivity/Impaired articulation
Impaired articulation
Increased productivity/Impaired articulation

## 2022-06-27 NOTE — BH INPATIENT PSYCHIATRY PROGRESS NOTE - NSBHASSESSSUMMFT_PSY_ALL_CORE
51 yr old female, single, domiciled and unemployed.  currently, in treatment at [facility] for [facility] disorder. other diagnoses as per Psyckes include: SAD and unspecified bipolar disorder.  hx of poor compliance to meds and psych aftercare.  Has had prior psych admissions; as per Psyckes: no other recent psych admissions.  Per chart review, there has been no documented hx of SA; has hx of cannabis abuse.  Pertinent medical issues include: anemia, HTN, breast cA s/p surgery (with ongoing oncologic care at [place]); hx of nontoxic goiter s/p thyroid surgery.  Today, presented to the ED BIB EMS after a bystander called 911 as Pt was undressing in public.    Improving linearity and memory observed. Less disorganized. Continues hypersexual. Waxing and waning. Benefit from risperidone trial/titration. R/o AMS due to medical etiology. Will repeat MRI due to patient's initial inability to complete. Risperdal 2mg qAM + 3mg qhs for cj/psychosis, excitatory catatonia ruled out. Plan to slowly taper Ativan. D/C Seroquel, switch to Haldol PRN.    Neuro recs:  1.	MRI of brain with and without gadolinium 6/27  	Not completed because patient unable to tolerate completion of study. Indeterminate small hyperintense foci on DWI in left occipital. Unable to rule out metastases.   2.	Further bloodwork including PT/INR, aPTT, Syphilis screen, ESR, serum autoimmune encephalopathy panel ([facility] Test ID: ENS2), anti-cardiolipin, p-ANCA, c-ANCA, anti-TPO, anti-thyroglobulin, ADRIANNE, anti-ß2 microglobulin, anti-dsDNA, C3 level, and C4 level.  Will consult with lab to determine how to order recommended labs.       Plan:  Psychiatry:   Depakote ER 1000mg HS for mood  Ativan 1.5mg TID for cj/agitation  Risperidone 2mg qAM + 4mg qhs for mood/psychosis  	Obtaining prior authorization for FULTON  haloperidol     Tablet 5 milliGRAM(s) Oral every 6 hours PRN Agitation  haloperidol    Injectable 5 milliGRAM(s) IntraMuscular once PRN Severe agitation  LORazepam     Tablet 2 milliGRAM(s) Oral every 6 hours PRN Agitation  LORazepam   Injectable 2 milliGRAM(s) IntraMuscular Once PRN severe agitation  melatonin. 5 milliGRAM(s) Oral at bedtime PRN Insomnia  nicotine  Polacrilex Gum 4 milliGRAM(s) Oral every 2 hours PRN smoking cessation    Observations: constant observation  Medical: MRI pending results  	Hx of breast CA, thyroid mass: Tx at [facility]  	Hypokalemia: resolved  	UTI: Bactrim 160mg/800mg BID x5 days--completed  Dispo:pending

## 2022-06-28 ENCOUNTER — INPATIENT (INPATIENT)
Facility: HOSPITAL | Age: 52
LOS: 1 days | Discharge: HOME | DRG: 65 | End: 2022-06-30
Attending: PSYCHIATRY & NEUROLOGY | Admitting: PSYCHIATRY & NEUROLOGY
Payer: MEDICAID

## 2022-06-28 VITALS
RESPIRATION RATE: 12 BRPM | TEMPERATURE: 97 F | OXYGEN SATURATION: 97 % | HEART RATE: 90 BPM | SYSTOLIC BLOOD PRESSURE: 105 MMHG | HEIGHT: 62 IN | DIASTOLIC BLOOD PRESSURE: 70 MMHG

## 2022-06-28 VITALS — TEMPERATURE: 98 F

## 2022-06-28 DIAGNOSIS — Z98.890 OTHER SPECIFIED POSTPROCEDURAL STATES: Chronic | ICD-10-CM

## 2022-06-28 DIAGNOSIS — F30.9 MANIC EPISODE, UNSPECIFIED: ICD-10-CM

## 2022-06-28 DIAGNOSIS — E89.0 POSTPROCEDURAL HYPOTHYROIDISM: Chronic | ICD-10-CM

## 2022-06-28 LAB
ALBUMIN SERPL ELPH-MCNC: 3.8 G/DL — SIGNIFICANT CHANGE UP (ref 3.3–5)
ALP SERPL-CCNC: 58 U/L — SIGNIFICANT CHANGE UP (ref 40–120)
ALT FLD-CCNC: 29 U/L — SIGNIFICANT CHANGE UP (ref 10–45)
ANION GAP SERPL CALC-SCNC: 13 MMOL/L — SIGNIFICANT CHANGE UP (ref 5–17)
APTT BLD: 26.6 SEC — LOW (ref 27.5–35.5)
AST SERPL-CCNC: 21 U/L — SIGNIFICANT CHANGE UP (ref 10–40)
B2 GLYCOPROT1 IGA SER QL: <5 SAU — SIGNIFICANT CHANGE UP
B2 GLYCOPROT1 IGG SER-ACNC: <5 SGU — SIGNIFICANT CHANGE UP
B2 GLYCOPROT1 IGM SER-ACNC: 17.8 SMU — SIGNIFICANT CHANGE UP
BASE EXCESS BLDV CALC-SCNC: 8.3 MMOL/L — HIGH (ref -2–2)
BASOPHILS # BLD AUTO: 0 K/UL — SIGNIFICANT CHANGE UP (ref 0–0.2)
BASOPHILS NFR BLD AUTO: 0 % — SIGNIFICANT CHANGE UP (ref 0–2)
BILIRUB SERPL-MCNC: <0.1 MG/DL — LOW (ref 0.2–1.2)
BUN SERPL-MCNC: 26 MG/DL — HIGH (ref 7–23)
CA-I SERPL-SCNC: 1.22 MMOL/L — SIGNIFICANT CHANGE UP (ref 1.15–1.33)
CALCIUM SERPL-MCNC: 9.2 MG/DL — SIGNIFICANT CHANGE UP (ref 8.4–10.5)
CHLORIDE BLDV-SCNC: 100 MMOL/L — SIGNIFICANT CHANGE UP (ref 96–108)
CHLORIDE SERPL-SCNC: 97 MMOL/L — SIGNIFICANT CHANGE UP (ref 96–108)
CO2 BLDV-SCNC: 35 MMOL/L — HIGH (ref 22–26)
CO2 SERPL-SCNC: 28 MMOL/L — SIGNIFICANT CHANGE UP (ref 22–31)
CREAT SERPL-MCNC: 0.68 MG/DL — SIGNIFICANT CHANGE UP (ref 0.5–1.3)
EGFR: 107 ML/MIN/1.73M2 — SIGNIFICANT CHANGE UP
EOSINOPHIL # BLD AUTO: 0.41 K/UL — SIGNIFICANT CHANGE UP (ref 0–0.5)
EOSINOPHIL NFR BLD AUTO: 4.4 % — SIGNIFICANT CHANGE UP (ref 0–6)
GAS PNL BLDV: 136 MMOL/L — SIGNIFICANT CHANGE UP (ref 136–145)
GAS PNL BLDV: SIGNIFICANT CHANGE UP
GAS PNL BLDV: SIGNIFICANT CHANGE UP
GLUCOSE BLDV-MCNC: 109 MG/DL — HIGH (ref 70–99)
GLUCOSE SERPL-MCNC: 110 MG/DL — HIGH (ref 70–99)
HCO3 BLDV-SCNC: 33 MMOL/L — HIGH (ref 22–29)
HCT VFR BLD CALC: 30.9 % — LOW (ref 34.5–45)
HCT VFR BLDA CALC: 31 % — LOW (ref 34.5–46.5)
HGB BLD CALC-MCNC: 10.4 G/DL — LOW (ref 11.7–16.1)
HGB BLD-MCNC: 9.9 G/DL — LOW (ref 11.5–15.5)
INR BLD: 1.01 RATIO — SIGNIFICANT CHANGE UP (ref 0.88–1.16)
LACTATE BLDV-MCNC: 1.6 MMOL/L — SIGNIFICANT CHANGE UP (ref 0.7–2)
LYMPHOCYTES # BLD AUTO: 2.19 K/UL — SIGNIFICANT CHANGE UP (ref 1–3.3)
LYMPHOCYTES # BLD AUTO: 23.7 % — SIGNIFICANT CHANGE UP (ref 13–44)
MAGNESIUM SERPL-MCNC: 2 MG/DL — SIGNIFICANT CHANGE UP (ref 1.6–2.6)
MANUAL SMEAR VERIFICATION: SIGNIFICANT CHANGE UP
MCHC RBC-ENTMCNC: 29.6 PG — SIGNIFICANT CHANGE UP (ref 27–34)
MCHC RBC-ENTMCNC: 32 GM/DL — SIGNIFICANT CHANGE UP (ref 32–36)
MCV RBC AUTO: 92.5 FL — SIGNIFICANT CHANGE UP (ref 80–100)
METAMYELOCYTES # FLD: 1.8 % — HIGH (ref 0–0)
MONOCYTES # BLD AUTO: 0.56 K/UL — SIGNIFICANT CHANGE UP (ref 0–0.9)
MONOCYTES NFR BLD AUTO: 6.1 % — SIGNIFICANT CHANGE UP (ref 2–14)
MYELOCYTES NFR BLD: 1.7 % — HIGH (ref 0–0)
NEUTROPHILS # BLD AUTO: 5.74 K/UL — SIGNIFICANT CHANGE UP (ref 1.8–7.4)
NEUTROPHILS NFR BLD AUTO: 62.3 % — SIGNIFICANT CHANGE UP (ref 43–77)
NRBC # BLD: 1 /100 — HIGH (ref 0–0)
PCO2 BLDV: 48 MMHG — HIGH (ref 39–42)
PH BLDV: 7.45 — HIGH (ref 7.32–7.43)
PHOSPHATE SERPL-MCNC: 5 MG/DL — HIGH (ref 2.5–4.5)
PLAT MORPH BLD: NORMAL — SIGNIFICANT CHANGE UP
PLATELET # BLD AUTO: 315 K/UL — SIGNIFICANT CHANGE UP (ref 150–400)
PO2 BLDV: 41 MMHG — SIGNIFICANT CHANGE UP (ref 25–45)
POTASSIUM BLDV-SCNC: 4.6 MMOL/L — SIGNIFICANT CHANGE UP (ref 3.5–5.1)
POTASSIUM SERPL-MCNC: 4.4 MMOL/L — SIGNIFICANT CHANGE UP (ref 3.5–5.3)
POTASSIUM SERPL-SCNC: 4.4 MMOL/L — SIGNIFICANT CHANGE UP (ref 3.5–5.3)
PROT SERPL-MCNC: 6.4 G/DL — SIGNIFICANT CHANGE UP (ref 6–8.3)
PROTHROM AB SERPL-ACNC: 11.6 SEC — SIGNIFICANT CHANGE UP (ref 10.5–13.4)
RBC # BLD: 3.34 M/UL — LOW (ref 3.8–5.2)
RBC # FLD: 13.7 % — SIGNIFICANT CHANGE UP (ref 10.3–14.5)
RBC BLD AUTO: SIGNIFICANT CHANGE UP
SAO2 % BLDV: 67.3 % — SIGNIFICANT CHANGE UP (ref 67–88)
SODIUM SERPL-SCNC: 138 MMOL/L — SIGNIFICANT CHANGE UP (ref 135–145)
WBC # BLD: 9.22 K/UL — SIGNIFICANT CHANGE UP (ref 3.8–10.5)
WBC # FLD AUTO: 9.22 K/UL — SIGNIFICANT CHANGE UP (ref 3.8–10.5)

## 2022-06-28 PROCEDURE — 83020 HEMOGLOBIN ELECTROPHORESIS: CPT | Mod: 26

## 2022-06-28 PROCEDURE — 99233 SBSQ HOSP IP/OBS HIGH 50: CPT

## 2022-06-28 PROCEDURE — 71260 CT THORAX DX C+: CPT | Mod: 26

## 2022-06-28 PROCEDURE — 99285 EMERGENCY DEPT VISIT HI MDM: CPT

## 2022-06-28 PROCEDURE — 74177 CT ABD & PELVIS W/CONTRAST: CPT | Mod: 26

## 2022-06-28 RX ORDER — DIVALPROEX SODIUM 500 MG/1
2 TABLET, DELAYED RELEASE ORAL
Qty: 0 | Refills: 0 | DISCHARGE
Start: 2022-06-28

## 2022-06-28 RX ORDER — ACETAMINOPHEN 500 MG
650 TABLET ORAL EVERY 6 HOURS
Refills: 0 | Status: DISCONTINUED | OUTPATIENT
Start: 2022-06-28 | End: 2022-06-30

## 2022-06-28 RX ORDER — ATORVASTATIN CALCIUM 80 MG/1
80 TABLET, FILM COATED ORAL AT BEDTIME
Refills: 0 | Status: DISCONTINUED | OUTPATIENT
Start: 2022-06-28 | End: 2022-06-30

## 2022-06-28 RX ORDER — ACETAMINOPHEN 500 MG
2 TABLET ORAL
Qty: 0 | Refills: 0 | DISCHARGE
Start: 2022-06-28

## 2022-06-28 RX ORDER — ENOXAPARIN SODIUM 100 MG/ML
40 INJECTION SUBCUTANEOUS EVERY 24 HOURS
Refills: 0 | Status: DISCONTINUED | OUTPATIENT
Start: 2022-06-28 | End: 2022-06-30

## 2022-06-28 RX ORDER — DIVALPROEX SODIUM 500 MG/1
1000 TABLET, DELAYED RELEASE ORAL AT BEDTIME
Refills: 0 | Status: DISCONTINUED | OUTPATIENT
Start: 2022-06-28 | End: 2022-06-30

## 2022-06-28 RX ORDER — LANOLIN ALCOHOL/MO/W.PET/CERES
5 CREAM (GRAM) TOPICAL AT BEDTIME
Refills: 0 | Status: DISCONTINUED | OUTPATIENT
Start: 2022-06-28 | End: 2022-06-30

## 2022-06-28 RX ORDER — HALOPERIDOL DECANOATE 100 MG/ML
1 INJECTION INTRAMUSCULAR
Qty: 0 | Refills: 0 | DISCHARGE
Start: 2022-06-28

## 2022-06-28 RX ORDER — RISPERIDONE 4 MG/1
1 TABLET ORAL
Qty: 0 | Refills: 0 | DISCHARGE
Start: 2022-06-28

## 2022-06-28 RX ORDER — BENZTROPINE MESYLATE 1 MG
1 TABLET ORAL AT BEDTIME
Refills: 0 | Status: DISCONTINUED | OUTPATIENT
Start: 2022-06-28 | End: 2022-06-30

## 2022-06-28 RX ORDER — ASPIRIN/CALCIUM CARB/MAGNESIUM 324 MG
81 TABLET ORAL DAILY
Refills: 0 | Status: DISCONTINUED | OUTPATIENT
Start: 2022-06-28 | End: 2022-06-30

## 2022-06-28 RX ORDER — BENZTROPINE MESYLATE 1 MG
1 TABLET ORAL
Qty: 0 | Refills: 0 | DISCHARGE
Start: 2022-06-28

## 2022-06-28 RX ORDER — CLOPIDOGREL BISULFATE 75 MG/1
75 TABLET, FILM COATED ORAL DAILY
Refills: 0 | Status: DISCONTINUED | OUTPATIENT
Start: 2022-06-28 | End: 2022-06-30

## 2022-06-28 RX ORDER — LANOLIN ALCOHOL/MO/W.PET/CERES
1 CREAM (GRAM) TOPICAL
Qty: 0 | Refills: 0 | DISCHARGE

## 2022-06-28 RX ADMIN — Medication 1 TABLET(S): at 08:19

## 2022-06-28 RX ADMIN — DIVALPROEX SODIUM 1000 MILLIGRAM(S): 500 TABLET, DELAYED RELEASE ORAL at 23:03

## 2022-06-28 RX ADMIN — ATORVASTATIN CALCIUM 80 MILLIGRAM(S): 80 TABLET, FILM COATED ORAL at 23:03

## 2022-06-28 RX ADMIN — Medication 1 MILLIGRAM(S): at 23:03

## 2022-06-28 RX ADMIN — Medication 1.5 MILLIGRAM(S): at 14:26

## 2022-06-28 RX ADMIN — RISPERIDONE 2 MILLIGRAM(S): 4 TABLET ORAL at 08:19

## 2022-06-28 RX ADMIN — ENOXAPARIN SODIUM 40 MILLIGRAM(S): 100 INJECTION SUBCUTANEOUS at 23:02

## 2022-06-28 RX ADMIN — Medication 650 MILLIGRAM(S): at 14:52

## 2022-06-28 RX ADMIN — Medication 1.5 MILLIGRAM(S): at 08:19

## 2022-06-28 RX ADMIN — Medication 5 MILLIGRAM(S): at 23:03

## 2022-06-28 NOTE — BH INPATIENT PSYCHIATRY PROGRESS NOTE - NSBHCHARTREVIEWVS_PSY_A_CORE FT
Vital Signs Last 24 Hrs  T(C): 36.5 (06-28-22 @ 14:18), Max: 36.5 (06-28-22 @ 14:18)  T(F): 97.7 (06-28-22 @ 14:18), Max: 97.7 (06-28-22 @ 14:18)  HR: --  BP: --  BP(mean): --  RR: --  SpO2: --    Orthostatic VS  06-28-22 @ 08:08  Lying BP: --/-- HR: --  Sitting BP: 105/64 HR: 91  Standing BP: 100/72 HR: 99  Site: upper left arm  Mode: electronic  Orthostatic VS  06-27-22 @ 19:07  Lying BP: --/-- HR: --  Sitting BP: 121/70 HR: 100  Standing BP: 120/69 HR: 93  Site: --  Mode: --  Orthostatic VS  06-27-22 @ 07:27  Lying BP: --/-- HR: --  Sitting BP: 117/75 HR: 93  Standing BP: 107/67 HR: 102  Site: --  Mode: --  Orthostatic VS  06-26-22 @ 20:12  Lying BP: --/-- HR: --  Sitting BP: 105/60 HR: 97  Standing BP: 111/74 HR: 101  Site: --  Mode: --   Vital Signs Last 24 Hrs  T(C): 36.8 (06-28-22 @ 19:19), Max: 36.8 (06-28-22 @ 19:19)  T(F): 98.2 (06-28-22 @ 19:19), Max: 98.2 (06-28-22 @ 19:19)  HR: --  BP: --  BP(mean): --  RR: --  SpO2: --    Orthostatic VS  06-28-22 @ 19:19  Lying BP: --/-- HR: --  Sitting BP: 115/73 HR: 100  Standing BP: 116/69 HR: 116  Site: --  Mode: --  Orthostatic VS  06-28-22 @ 08:08  Lying BP: --/-- HR: --  Sitting BP: 105/64 HR: 91  Standing BP: 100/72 HR: 99  Site: upper left arm  Mode: electronic  Orthostatic VS  06-27-22 @ 19:07  Lying BP: --/-- HR: --  Sitting BP: 121/70 HR: 100  Standing BP: 120/69 HR: 93  Site: --  Mode: --  Orthostatic VS  06-27-22 @ 07:27  Lying BP: --/-- HR: --  Sitting BP: 117/75 HR: 93  Standing BP: 107/67 HR: 102  Site: --  Mode: --  Orthostatic VS  06-26-22 @ 20:12  Lying BP: --/-- HR: --  Sitting BP: 105/60 HR: 97  Standing BP: 111/74 HR: 101  Site: --  Mode: --

## 2022-06-28 NOTE — BH INPATIENT PSYCHIATRY PROGRESS NOTE - NSBHMSEREMMEM_PSY_A_CORE
Normal
Unable to assess
Normal
Unable to assess
Normal
Impaired
Normal
Unable to assess

## 2022-06-28 NOTE — BH INPATIENT PSYCHIATRY PROGRESS NOTE - NSBHMSELANG_PSY_A_CORE
No abnormalities noted
No abnormalities noted
Unable to assess
Unable to assess
No abnormalities noted
Unable to assess
No abnormalities noted
Unable to assess
No abnormalities noted
No abnormalities noted
Unable to assess
Unable to assess
No abnormalities noted
Unable to assess

## 2022-06-28 NOTE — BH INPATIENT PSYCHIATRY PROGRESS NOTE - NSBHMSERECMEM_PSY_A_CORE
Unable to assess
Normal
Unable to assess
Normal
Unable to assess
Unable to assess
Normal
Normal
Unable to assess
Normal
Unable to assess

## 2022-06-28 NOTE — BH INPATIENT PSYCHIATRY DISCHARGE NOTE - NSDCCPCAREPLAN_GEN_ALL_CORE_FT
PRINCIPAL DISCHARGE DIAGNOSIS  Diagnosis: Bipolar I, recurrent manic episode  Assessment and Plan of Treatment:       SECONDARY DISCHARGE DIAGNOSES  Diagnosis: History of breast cancer in female  Assessment and Plan of Treatment:

## 2022-06-28 NOTE — BH INPATIENT PSYCHIATRY PROGRESS NOTE - NSCGISEVERILLNESS_PSY_ALL_CORE
7 = Among the most extremely ill patients – pathology drastically interferes in many life functions; may be hospitalized
7 = Among the most extremely ill patients – pathology drastically interferes in many life functions; may be hospitalized
6 = Severely ill - disruptive pathology, behavior and function are frequently influenced by symptoms, may require assistance from others
7 = Among the most extremely ill patients – pathology drastically interferes in many life functions; may be hospitalized
6 = Severely ill - disruptive pathology, behavior and function are frequently influenced by symptoms, may require assistance from others
7 = Among the most extremely ill patients – pathology drastically interferes in many life functions; may be hospitalized
7 = Among the most extremely ill patients – pathology drastically interferes in many life functions; may be hospitalized
6 = Severely ill - disruptive pathology, behavior and function are frequently influenced by symptoms, may require assistance from others
7 = Among the most extremely ill patients – pathology drastically interferes in many life functions; may be hospitalized
6 = Severely ill - disruptive pathology, behavior and function are frequently influenced by symptoms, may require assistance from others
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
## 2022-06-28 NOTE — ED ADULT NURSE NOTE - OBJECTIVE STATEMENT
50 y/o female transferred fro [facility], [facility] 4 for MRI d/t ams and cj, as per low 4 staff (Ferdinand), pt. has been in [facility] for @ least 2-3 weeks and has been acting bizarre and disorganized, hx of BAD, pt is getting more confused. denies any s/hi, no a/v hallucinations or delusions of any kind. pt. is here to be assess by neurologist. pt. placed on 1:1 CO for psychosis. 52 [facility] by ([facility]ana @ [facility], for MRI d/t ams and cj, as per low 4 staff (Ferdinand), pt. has been in [facility] 4 for @ least 2-3 weeks and has been acting bizarre and disorganized, hx of BAD, pt is getting more confused. denies any s/hi, no a/v hallucinations or delusions of any kind. pt. is here to be assess by neurologist. pt. placed on 1:1 CO for psychosis.

## 2022-06-28 NOTE — BH INPATIENT PSYCHIATRY PROGRESS NOTE - NSBHMSEHYG_PSY_A_CORE
Poor
Fair
Poor
Fair
Poor
Fair
Poor
Fair
Fair
Poor

## 2022-06-28 NOTE — BH INPATIENT PSYCHIATRY PROGRESS NOTE - NSICDXBHPRIMARYDX_PSY_ALL_CORE
Bipolar I, recurrent manic episode   F31.10  

## 2022-06-28 NOTE — BH INPATIENT PSYCHIATRY PROGRESS NOTE - NSBHMSEMUSCLE_PSY_A_CORE
Normal muscle tone/strength

## 2022-06-28 NOTE — BH INPATIENT PSYCHIATRY PROGRESS NOTE - NSBHMSEINTELL_PSY_A_CORE
Unable to assess
Unable to assess
Average
Unable to assess
Average
Unable to assess
Unable to assess
Average
Unable to assess
Average
Average
Unable to assess

## 2022-06-28 NOTE — BH INPATIENT PSYCHIATRY PROGRESS NOTE - NSCGIIMPROVESX_PSY_ALL_CORE
2 = Much improved - notably better with signficant reduction of symptoms; increase in the level of functioning but some symptoms remain
3 = Minimally improved - slightly better with little or no clinically meaningful reduction of symptoms.  Represents very little change in basic clinical status, level of care, or functional capacity.
2 = Much improved - notably better with signficant reduction of symptoms; increase in the level of functioning but some symptoms remain
3 = Minimally improved - slightly better with little or no clinically meaningful reduction of symptoms.  Represents very little change in basic clinical status, level of care, or functional capacity.
3 = Minimally improved - slightly better with little or no clinically meaningful reduction of symptoms.  Represents very little change in basic clinical status, level of care, or functional capacity.
2 = Much improved - notably better with signficant reduction of symptoms; increase in the level of functioning but some symptoms remain
3 = Minimally improved - slightly better with little or no clinically meaningful reduction of symptoms.  Represents very little change in basic clinical status, level of care, or functional capacity.
3 = Minimally improved - slightly better with little or no clinically meaningful reduction of symptoms.  Represents very little change in basic clinical status, level of care, or functional capacity.
2 = Much improved - notably better with signficant reduction of symptoms; increase in the level of functioning but some symptoms remain
3 = Minimally improved - slightly better with little or no clinically meaningful reduction of symptoms.  Represents very little change in basic clinical status, level of care, or functional capacity.
2 = Much improved - notably better with signficant reduction of symptoms; increase in the level of functioning but some symptoms remain
2 = Much improved - notably better with signficant reduction of symptoms; increase in the level of functioning but some symptoms remain

## 2022-06-28 NOTE — BH INPATIENT PSYCHIATRY DISCHARGE NOTE - NSDCMRMEDTOKEN_GEN_ALL_CORE_FT
acetaminophen 325 mg oral tablet: 2 tab(s) orally every 6 hours, As needed, Severe Pain (7 - 10)  benztropine 1 mg oral tablet: 1 tab(s) orally once a day (at bedtime)  divalproex sodium 500 mg oral tablet, extended release: 2 tab(s) orally once a day (at bedtime)  Invega Sustenna 156 mg/mL intramuscular suspension, extended release: 156 milligram(s) intramuscular once at least 4 days after 234mg dose  Invega Sustenna 234 mg/1.5 mL intramuscular suspension, extended release: 234 milligram(s) intramuscular once on 6/29  Melatonin 5 mg oral tablet: 1 tab(s) orally once a day (at bedtime)  Multiple Vitamins oral tablet: 1 tab(s) orally once a day   acetaminophen 325 mg oral tablet: 2 tab(s) orally every 6 hours, As needed, Severe Pain (7 - 10)  benztropine 1 mg oral tablet: 1 tab(s) orally once a day (at bedtime)  divalproex sodium 500 mg oral tablet, extended release: 2 tab(s) orally once a day (at bedtime)  Melatonin 5 mg oral tablet: 1 tab(s) orally once a day (at bedtime)  Multiple Vitamins oral tablet: 1 tab(s) orally once a day  risperiDONE 2 mg oral tablet, disintegratin tab(s) orally once a day  risperiDONE 4 mg oral tablet, disintegratin tab(s) orally once a day (at bedtime)   aspirin 81 mg oral tablet, chewable: 1 tab(s) orally once a day  atorvastatin 80 mg oral tablet: 1 tab(s) orally once a day (at bedtime)  benztropine 1 mg oral tablet: 1 tab(s) orally once a day (at bedtime)  divalproex sodium 500 mg oral tablet, extended release: 2 tab(s) orally once a day (at bedtime)  LORazepam 1 mg oral tablet: 1 tab(s) orally every 8 hours MDD:3 mg  Melatonin 5 mg oral tablet: 1 tab(s) orally once a day (at bedtime)  Multiple Vitamins oral tablet: 1 tab(s) orally once a day  risperiDONE 2 mg oral tablet: 1 tab(s) orally once a day (in the morning)   risperiDONE 4 mg oral tablet: 1 tab(s) orally once a day (at bedtime)

## 2022-06-28 NOTE — BH INPATIENT PSYCHIATRY DISCHARGE NOTE - ATTENDING DISCHARGE PHYSICAL EXAMINATION:
On discharge, clinical state significantly improved, patient no longer disorganized and disoriented, now improved behavioral control, decreased impulsivity, significantly decreased hypersexuality, per collateral pt restored to baseline. Alert, oriented, cooperative, good hygiene and grooming, psychomotor wnl, gait wnl, muscle movements/tone wnl, good eye contact, well-related, calm, speech volume and tone wnl though slightly increased rate and quantity, good mood, slightly elevated affect with slightly increased reactivity, somewhat circumstantial thought processes though generally linear, associations wnl, no delusions, denies and no evidence of AH/VH, oriented to person, place, time, context, short term and long term memory wnl with exception of period of acute psychosis, normal intelligence, normal fund of knowledge by vocabulary, fair to good (significantly improved) insight and judgment. Taking medications, finds them beneficial, aware of mental illness and seeking treatment. Agreeing with outpatient treatment and safety plan.

## 2022-06-28 NOTE — BH INPATIENT PSYCHIATRY PROGRESS NOTE - NSBHMSEAFFRANGE_PSY_A_CORE
Labile

## 2022-06-28 NOTE — ED PROVIDER NOTE - OBJECTIVE STATEMENT
51f send from inpt psych for worsening cj, outpt mri limited by motion artifact concerning for possible stroke sent to be admitted to neuro for further workup. pt denies any complaints at this time.

## 2022-06-28 NOTE — BH INPATIENT PSYCHIATRY DISCHARGE NOTE - REASON FOR ADMISSION
Admitted for acute cj with psychosis Admitted for acute cj with psychosis including paranoia and unusual behavior, including undressing in the street.

## 2022-06-28 NOTE — BH INPATIENT PSYCHIATRY PROGRESS NOTE - MSE OPTIONS
Structured MSE

## 2022-06-28 NOTE — ED PROVIDER NOTE - ATTENDING CONTRIBUTION TO CARE
51yr F hx of worsening cj at the facility concerning for possible acute stroke, sent in for admission to neuro and repeat MRI imaging. no specific complaints.   exam as above.   plan for labs, neuro consult and admit

## 2022-06-28 NOTE — BH INPATIENT PSYCHIATRY DISCHARGE NOTE - HOSPITAL COURSE
to be completed     Upon admission, the patient was severely thought disordered w/ disorganized and illogical thought process, delusions, visual and tactile hallucinations, and disorientation. The pt. also exhibited manic Sx including pressured speech, both elevated and irritable mood, lack of need for sleep, increased energy, PMA, flight of ideas and impulsivity. Pt. was placed on 1:1 for disorganization. Per collateral from outpatient psychiatrist and mother, the patient presents significantly more decompensated than prior instances of non-compliance. Excitatory catatonia was suspected. Antipsychotic Tx held, pt. started on Ativan and titrated to 2mg TID w/ little effect. Due to Hx of untreated breast CA, metastases to the brain was also considered.     Psychiatry: Risperdal started for cj and titrated to 2mg AM and 4mg HS. Depakote titrated to 1000mg HS; VPA level on  was 80.30. Gradually the patient’s Sx improved, lowering suspicion of brain metastases. The patient’s sleep improved, mood improved but w/ some lability, speech was slight pressured but w/ significant improvement since admission. Thought process was grossly linear, tangential at times. She was medication adherent and tolerated them well.     Medical: Neurology consult completed w/ recommendation for MRI of brain and following labs (results in [place]): PT/INR, aPTT, Syphilis screen, ESR, serum autoimmune encephalopathy panel ([facility] Test ID: [id number]), anti-cardiolipin, p-ANCA, c-ANCA, anti-TPO, anti-thyroglobulin, ADRIANNE, anti-ß2 microglobulin, anti-dsDNA, C3 level, and C4 level. Neurology reviewed labs and MRI. Anti-cardiolipin IgM and beta 2 glycoprotein were elevated. MRI was ordered and completed twice for poor quality 2/2 movement. 2nd MRI revealed abnormal signaling that raised concern w/ neurology for hyper-coagulant state and risk for stroke 2/2 cancer.  Transfer to [facility] neurology unit was recommended for evaluation and the pt. agreed.     The patient was made aware of the risks and benefits of each medication and tolerated them well with no apparent side effects.     The pt. did not require restraints while admitted. She did require emergency IM medications initially and w/ attenuation of Sx, PRN medications were not required. The patient’s mother was contacted prior to discharge for safety planning and disposition planning. Family is supportive and available.      Suicidal and aggression risk assessments were performed on the day of discharge. The patient is at elevated chronic risk of self-harm due to an underlying mood disorder. She is hypomanic but is not an acute risk of harm making her appropriate for less restrictive treatment as an outpatient without need for continued inpatient hospitalization. Protective factors for suicide include future orientation, social support, treatment engagement, residential stability. Risk factors include insomnia, impulsivity, non-compliance, chronic illness.     Immediate risk was minimized by inpatient admission to a safe environment with appropriate supervision and limited access to lethal means. Future risk was minimized before discharge by treatment of anxiety/depressive symptoms, maximizing outpatient support, providing relevant patient education, discussing emergency procedures, and ensuring close follow-up. Patient denies all suicidal and aggressive/homicidal ideation, intent and plan, and, in view of demonstrated clinical improvement, is not judged to be an acute danger to self or others at this time. Although the patient remains at their chronic baseline risk of self-harm, such risk cannot be further ameliorated by continued inpatient treatment and the patient is therefore appropriate for discharge.     On the day of discharge, the patient’s mood and affect are normal/euthymic. Patient denies depressed mood and anxiety. Patient is not hopeless. Sleep and appetite are also normal (at baseline).  Pt’s motor performance and productivity of speech are close to baseline. Pt denies symptoms of psychosis including AH/VH with no apparent delusions (or is at baseline/not preoccupied with delusions).  Patient denies S/H/I/I/P.     Patient has made clinically meaningful progress during this hospitalization and has clearly benefited from medications and psychotherapy. On day of discharge, the patient has improved significantly and no longer requires inpatient treatment and care. Pt will be discharged and follow-up with outpatient care.      Patient was provided with extensive psychoeducation on treatment options and motivational counseling targeting healthy lifestyle. Patient was instructed on actions for crisis situations, understood and agreed to follow instructions for handling crisis, including coming to ER or calling 911 should the patient or their family feel that they are in danger of hurting self or others. Patient also was given Suicide Prevention Lifeline number 1-988.642.3368 and provided with instructions on its use.   Patient was advised to avoid driving until their reactions are not impaired by medications. Motivational counseling was provided. Family is supportive and was provided with similar safety plan instructions.     Patient will be discharged with the following DSM5 Diagnoses:    Bipolar I, recurrent manic episode   F31.10      Patient will be discharged on the following medications:   acetaminophen 325 mg oral tablet: 2 tab(s) orally every 6 hours, As needed, Severe Pain (7 - 10)  benztropine 1 mg oral tablet: 1 tab(s) orally once a day (at bedtime)  divalproex sodium 500 mg oral tablet, extended release: 2 tab(s) orally once a day (at bedtime)  Melatonin 5 mg oral tablet: 1 tab(s) orally once a day (at bedtime)  Multiple Vitamins oral tablet: 1 tab(s) orally once a day  risperiDONE 2 mg oral tablet, disintegratin tab(s) orally once a day  risperiDONE 4 mg oral tablet, disintegratin tab(s) orally once a day (at bedtime)    Handoff emailed to Dr. Yanez at [facility] and Dr. Larsen at [facility]. to including discussion of hospital course, treatment, and medications. The patient’s appointment is on  at 3PM.  Inpatient Provider:  Seth Blake, SAMMI-BC  [phone]

## 2022-06-28 NOTE — BH INPATIENT PSYCHIATRY PROGRESS NOTE - NSTXMANICGOAL_PSY_ALL_CORE
Other...
State a reason daily why adherence to mood stabilizers is necessary
Other...
Other...
State a reason daily why adherence to mood stabilizers is necessary
Other...
State a reason daily why adherence to mood stabilizers is necessary
Other...

## 2022-06-28 NOTE — BH INPATIENT PSYCHIATRY PROGRESS NOTE - NSTXMEDICGOAL_PSY_ALL_CORE
Be able to describe the benefit of medication/treatment
Be able to state dosages and times to take medications
Be able to state dosages and times to take medications
Take all medications as prescribed
Be able to state dosages and times to take medications
Take all medications as prescribed
Be able to state dosages and times to take medications
Take all medications as prescribed
Be able to state dosages and times to take medications
Take all medications as prescribed
Be able to state dosages and times to take medications

## 2022-06-28 NOTE — BH INPATIENT PSYCHIATRY PROGRESS NOTE - NSBHMETABOLIC_PSY_ALL_CORE_FT
BMI: BMI (kg/m2): 25.8 (06-06-22 @ 13:59)  HbA1c: A1C with Estimated Average Glucose Result: 5.2 % (06-06-22 @ 17:20)    Glucose:   BP: --  Lipid Panel: Date/Time: 06-08-22 @ 15:36  Cholesterol, Serum: 206  Direct LDL: --  HDL Cholesterol, Serum: 47  Total Cholesterol/HDL Ration Measurement: --  Triglycerides, Serum: 80  
BMI: BMI (kg/m2): 25.8 (06-06-22 @ 13:59)  HbA1c: A1C with Estimated Average Glucose Result: 5.2 % (06-06-22 @ 17:20)    Glucose:   BP: --  Lipid Panel: Date/Time: 06-08-22 @ 15:36  Cholesterol, Serum: 206  Direct LDL: --  HDL Cholesterol, Serum: 47  Total Cholesterol/HDL Ration Measurement: --  Triglycerides, Serum: 80  
BMI: BMI (kg/m2): 25.8 (06-06-22 @ 13:59)  HbA1c: A1C with Estimated Average Glucose Result: 5.2 % (06-06-22 @ 17:20)    Glucose:   BP: 122/82 (06-11-22 @ 00:20) (122/82 - 122/82)  Lipid Panel: Date/Time: 06-08-22 @ 15:36  Cholesterol, Serum: 206  Direct LDL: --  HDL Cholesterol, Serum: 47  Total Cholesterol/HDL Ration Measurement: --  Triglycerides, Serum: 80  
BMI: BMI (kg/m2): 25.8 (06-06-22 @ 13:59)  HbA1c: A1C with Estimated Average Glucose Result: 5.2 % (06-06-22 @ 17:20)    Glucose:   BP: --  Lipid Panel: Date/Time: 06-08-22 @ 15:36  Cholesterol, Serum: 206  Direct LDL: --  HDL Cholesterol, Serum: 47  Total Cholesterol/HDL Ration Measurement: --  Triglycerides, Serum: 80  
BMI: BMI (kg/m2): 25.8 (06-06-22 @ 13:59)  HbA1c: A1C with Estimated Average Glucose Result: 5.2 % (06-06-22 @ 17:20)    Glucose:   BP: 122/82 (06-11-22 @ 00:20) (122/82 - 122/82)  Lipid Panel: Date/Time: 06-08-22 @ 15:36  Cholesterol, Serum: 206  Direct LDL: --  HDL Cholesterol, Serum: 47  Total Cholesterol/HDL Ration Measurement: --  Triglycerides, Serum: 80  
BMI: BMI (kg/m2): 25.8 (06-06-22 @ 13:59)  HbA1c: A1C with Estimated Average Glucose Result: 5.2 % (06-06-22 @ 17:20)    Glucose:   BP: 117/62 (06-08-22 @ 06:50) (117/62 - 117/62)  Lipid Panel: Date/Time: 06-08-22 @ 15:36  Cholesterol, Serum: 206  Direct LDL: --  HDL Cholesterol, Serum: 47  Total Cholesterol/HDL Ration Measurement: --  Triglycerides, Serum: 80  
BMI: BMI (kg/m2): 25.8 (06-06-22 @ 13:59)  HbA1c: A1C with Estimated Average Glucose Result: 5.2 % (06-06-22 @ 17:20)    Glucose:   BP: 122/82 (06-11-22 @ 00:20) (122/82 - 122/82)  Lipid Panel: Date/Time: 06-08-22 @ 15:36  Cholesterol, Serum: 206  Direct LDL: --  HDL Cholesterol, Serum: 47  Total Cholesterol/HDL Ration Measurement: --  Triglycerides, Serum: 80  
BMI: BMI (kg/m2): 25.8 (06-06-22 @ 13:59)  HbA1c: A1C with Estimated Average Glucose Result: 5.2 % (06-06-22 @ 17:20)    Glucose:   BP: --  Lipid Panel: Date/Time: 06-08-22 @ 15:36  Cholesterol, Serum: 206  Direct LDL: --  HDL Cholesterol, Serum: 47  Total Cholesterol/HDL Ration Measurement: --  Triglycerides, Serum: 80  
BMI: BMI (kg/m2): 25.8 (06-06-22 @ 13:59)  HbA1c: A1C with Estimated Average Glucose Result: 5.2 % (06-06-22 @ 17:20)    Glucose:   BP: 117/62 (06-08-22 @ 06:50) (114/82 - 138/85)  Lipid Panel: 
BMI: BMI (kg/m2): 25.8 (06-06-22 @ 13:59)  HbA1c: A1C with Estimated Average Glucose Result: 5.2 % (06-06-22 @ 17:20)    Glucose:   BP: --  Lipid Panel: Date/Time: 06-08-22 @ 15:36  Cholesterol, Serum: 206  Direct LDL: --  HDL Cholesterol, Serum: 47  Total Cholesterol/HDL Ration Measurement: --  Triglycerides, Serum: 80  
BMI: BMI (kg/m2): 25.8 (06-06-22 @ 13:59)  HbA1c: A1C with Estimated Average Glucose Result: 5.2 % (06-06-22 @ 17:20)    Glucose:   BP: 117/62 (06-08-22 @ 06:50) (117/62 - 117/62)  Lipid Panel: Date/Time: 06-08-22 @ 15:36  Cholesterol, Serum: 206  Direct LDL: --  HDL Cholesterol, Serum: 47  Total Cholesterol/HDL Ration Measurement: --  Triglycerides, Serum: 80  
BMI: BMI (kg/m2): 25.8 (06-06-22 @ 13:59)  HbA1c: A1C with Estimated Average Glucose Result: 5.2 % (06-06-22 @ 17:20)    Glucose:   BP: --  Lipid Panel: Date/Time: 06-08-22 @ 15:36  Cholesterol, Serum: 206  Direct LDL: --  HDL Cholesterol, Serum: 47  Total Cholesterol/HDL Ration Measurement: --  Triglycerides, Serum: 80  

## 2022-06-28 NOTE — BH INPATIENT PSYCHIATRY PROGRESS NOTE - OTHER
grossly linear w/ periods of tangentiality  memory impairment 2/2 to concerns regarding medical issues "OK"

## 2022-06-28 NOTE — H&P ADULT - NSICDXPASTMEDICALHX_GEN_ALL_CORE_FT
PAST MEDICAL HISTORY:  Anemia     Bipolar disorder     HTN (hypertension)     Invasive ductal carcinoma of breast s/p R breast lumpectomy    Nontoxic goiter s/p thyroidectomy

## 2022-06-28 NOTE — BH INPATIENT PSYCHIATRY PROGRESS NOTE - CURRENT MEDICATION
MEDICATIONS  (STANDING):  benztropine 1 milliGRAM(s) Oral at bedtime  diVALproex ER 1000 milliGRAM(s) Oral at bedtime  LORazepam     Tablet 1 milliGRAM(s) Oral three times a day  multivitamin 1 Tablet(s) Oral daily  risperiDONE  Disintegrating Tablet 2 milliGRAM(s) Oral daily  risperiDONE  Disintegrating Tablet 4 milliGRAM(s) Oral at bedtime    MEDICATIONS  (PRN):  acetaminophen     Tablet .. 650 milliGRAM(s) Oral every 6 hours PRN Severe Pain (7 - 10)  haloperidol     Tablet 5 milliGRAM(s) Oral every 6 hours PRN Agitation  haloperidol    Injectable 5 milliGRAM(s) IntraMuscular once PRN Severe agitation  LORazepam   Injectable 2 milliGRAM(s) IntraMuscular Once PRN severe agitation  melatonin. 5 milliGRAM(s) Oral at bedtime PRN Insomnia  nicotine  Polacrilex Gum 4 milliGRAM(s) Oral every 2 hours PRN smoking cessation

## 2022-06-28 NOTE — BH INPATIENT PSYCHIATRY PROGRESS NOTE - NSBHCONTPROVIDER_PSY_ALL_CORE
Yes...

## 2022-06-28 NOTE — BH INPATIENT PSYCHIATRY PROGRESS NOTE - GENERAL APPEARANCE
No deformities present

## 2022-06-28 NOTE — H&P ADULT - NSHPLABSRESULTS_GEN_ALL_CORE
LABS:                       9.9    9.22  )-----------( 315      ( 28 Jun 2022 22:04 )             30.9     06-28    138  |  97  |  26<H>  ----------------------------<  110<H>  4.4   |  28  |  0.68    Ca    9.2      28 Jun 2022 22:04  Phos  5.0     06-28  Mg     2.0     06-28    TPro  6.4  /  Alb  3.8  /  TBili  <0.1<L>  /  DBili  x   /  AST  21  /  ALT  29  /  AlkPhos  58  06-28    PT/INR - ( 28 Jun 2022 22:04 )   PT: 11.6 sec;   INR: 1.01 ratio      PTT - ( 28 Jun 2022 22:04 )  PTT:26.6 sec    RADIOLOGY:  -06/16 MRI Head w/wo: Incomplete study limits evaluation. Indeterminate small hyperintense foci on diffusion-weighted images within the left occipital lobe. Subacute infarcts or metastases are not excluded. Recommend completing examination with sedation if necessary.  -06/27 MRI Head w/wo: Areas of abnormal signal involving the diffusion weighted sequence is again seen. It is unclear whether these are due to acute infarcts versus underlying lesions. Better quality contrast enhanced MRI of the brain is recommended when patient can tolerate it or sedation be given.

## 2022-06-28 NOTE — BH INPATIENT PSYCHIATRY PROGRESS NOTE - NSTXMEDICDATENEW_PSY_ALL_CORE
21-Jun-2022

## 2022-06-28 NOTE — BH INPATIENT PSYCHIATRY PROGRESS NOTE - NSBHMSEAFFQUAL_PSY_A_CORE
Euthymic/Elevated/Irritable
Euthymic/Elevated/Irritable
Euthymic/Depressed/Elevated/Irritable
Euthymic/Depressed/Elevated/Irritable
Elevated
Euthymic/Elevated/Irritable
Elevated
Euthymic/Elevated/Irritable
Elevated
Euthymic/Depressed/Elevated/Irritable
Euthymic/Elevated/Irritable
Elevated
Euthymic/Elevated/Irritable
Euthymic/Elevated/Irritable
Elevated
Elevated
Euthymic/Irritable/Anxious/Other

## 2022-06-28 NOTE — BH INPATIENT PSYCHIATRY DISCHARGE NOTE - NSDCPROCEDURESFT_PSY_ALL_CORE
MRI 6/16/22 *ended prematurely because pt was unable to tolerate  Incomplete study limits evaluation.    Indeterminate small hyperintense foci on diffusion-weighted images within   the left occipital lobe. Subacute infarcts or metastases are not   excluded. Recommend completing examination with sedation if necessary.   MRI 6/16/22 *ended prematurely because pt was unable to tolerate  Indeterminate small hyperintense foci on diffusion-weighted images within the left occipital lobe. Subacute infarcts or metastases are not excluded. Recommend completing examination with sedation if necessary.    MRI 6/27/22 *"extremely limited by motion" despite PRN before study  IMPRESSION: Areas of abnormal signal involving the diffusion weighted  sequence is again seen. It is unclear whether these are due to acute  infarcts versus underlying lesions. Better quality contrast enhanced MRI  of the brain is recommended when patient can tolerate it or sedation be  given.    ANA: Posiitive, IgG 23.3, IgM 23.0, IgA 5.4  b2 Glycoprotein 1  ab: positive, IgG <5.0, IgM 17.8, IgA <5.0  b2    Syphilis screen: negative  ESR 39  PTT: 28.6  PT: 12.5  INR 1.08

## 2022-06-28 NOTE — BH INPATIENT PSYCHIATRY PROGRESS NOTE - NSBHPSYCHOLCOGORIENT_PSY_A_CORE
Unable to assess
Oriented to time, place, person, situation
Unable to assess
Oriented to time, place, person, situation
Unable to assess
Not fully oriented...
Unable to assess
Not fully oriented...
Unable to assess
Unable to assess
Oriented to time, place, person, situation
Unable to assess
Not fully oriented...
Unable to assess
Unable to assess
Not fully oriented...
Unable to assess
Oriented to time, place, person, situation
Unable to assess

## 2022-06-28 NOTE — BH PSYCHOLOGY - GROUP THERAPY NOTE - NSBHPSYCHOLPARTICIPCOMMENT_PSY_A_CORE FT
Patient was fully engaged in group discussion. Patient shared when called upon and patient's comments were appropriate. Patient received support and validation from other group members and responded to them appropriately. Patient meaningfully contributed to the discussion and connected with others. Thought process was clear, speech was normal rate, tone, speed, and patient was oriented to time, place, and person.

## 2022-06-28 NOTE — BH INPATIENT PSYCHIATRY PROGRESS NOTE - NSBHFUPINTERVALCCFT_PSY_A_CORE
Pt seen f/u for bipolar d/o  
"I'm at my mom's house"
"I'm at [facility]"
Pt seen f/u for bipolar d/o  
Pt. remains disorganized  
Pt seen f/u for bipolar d/o  

## 2022-06-28 NOTE — BH INPATIENT PSYCHIATRY PROGRESS NOTE - NSBHMSEKNOW_PSY_A_CORE
Normal
Unable to assess
Normal
Unable to assess
Unable to assess
Normal
Unable to assess
Normal
Unable to assess
Normal
Unable to assess
Normal
Unable to assess
Unable to assess
Normal

## 2022-06-28 NOTE — BH INPATIENT PSYCHIATRY PROGRESS NOTE - NSBHMSEGAIT_PSY_A_CORE
Other
Normal gait / station
Other
Normal gait / station
Normal gait / station
Other
Normal gait / station
Other
Normal gait / station
Other
Other

## 2022-06-28 NOTE — BH INPATIENT PSYCHIATRY PROGRESS NOTE - NSBHMSESPEECH_PSY_A_CORE
Abnormal as indicated, otherwise normal...
Normal volume, rate, productivity, spontaneity and articulation
Abnormal as indicated, otherwise normal...

## 2022-06-28 NOTE — CHART NOTE - NSCHARTNOTEFT_GEN_A_CORE
[facility] Inpatient to ED Transfer Summary    Reason for Transfer/Medical Summary: 51 yr old female, Hx of HTN, anemia, breast cA s/p surgery (with ongoing oncologic care at [facility]); hx of nontoxic goiter s/p thyroid surgery. Pt. being transferred to [facility] for admission to neuro service for DWI imaging of the brain revealing concern for hypercoagulable state 2/2 breast CA. Concern that brain mets may be cause for disorganization and disorientation upon admission and is the initial reason for ordering MRI. Less concern for this now as pt. has responded to psychotropic medications, however, cannot be ruled out due to poor quality imaging.   Inpatient neurologist, Dr. Winston, coordinated transfer w/ stroke attending Dr. Dallas Olivarez [phone]; fellow is Dr. Leyla Hendrix.   See below for psych recommendations.      PAST MEDICAL & SURGICAL HISTORY:  Bipolar disorder      Anemia      Bipolar 1 disorder, manic, moderate      No significant past surgical history        Allergies    No Known Allergies    Intolerances        MEDICATIONS  (STANDING):  benztropine 1 milliGRAM(s) Oral at bedtime  diVALproex ER 1000 milliGRAM(s) Oral at bedtime  multivitamin 1 Tablet(s) Oral daily  risperiDONE  Disintegrating Tablet 2 milliGRAM(s) Oral daily  risperiDONE  Disintegrating Tablet 4 milliGRAM(s) Oral at bedtime    MEDICATIONS  (PRN):  acetaminophen     Tablet .. 650 milliGRAM(s) Oral every 6 hours PRN Severe Pain (7 - 10)  haloperidol     Tablet 5 milliGRAM(s) Oral every 6 hours PRN Agitation  haloperidol    Injectable 5 milliGRAM(s) IntraMuscular once PRN Severe agitation  LORazepam   Injectable 2 milliGRAM(s) IntraMuscular Once PRN severe agitation  melatonin. 5 milliGRAM(s) Oral at bedtime PRN Insomnia  nicotine  Polacrilex Gum 4 milliGRAM(s) Oral every 2 hours PRN smoking cessation      Vital Signs Last 24 Hrs  T(C): 36.5 (28 Jun 2022 14:18), Max: 36.7 (27 Jun 2022 19:07)  T(F): 97.7 (28 Jun 2022 14:18), Max: 98.1 (27 Jun 2022 19:07)  HR: --  BP: --  BP(mean): --  RR: --  SpO2: --  CAPILLARY BLOOD GLUCOSE            PHYSICAL EXAM:  GENERAL: NAD, well-developed  HEAD:  Atraumatic, Normocephalic  EYES: EOMI, PERRLA, conjunctiva and sclera clear  NECK: Supple, No JVD  CHEST/LUNG: Clear to auscultation bilaterally; No wheeze  HEART: Regular rate and rhythm; No murmurs, rubs, or gallops  ABDOMEN: Soft, Nontender, Nondistended; Bowel sounds present  EXTREMITIES:  2+ Peripheral Pulses, No clubbing, cyanosis, or edema  PSYCH: AAOx3  NEUROLOGY: non-focal  SKIN: No rashes or lesions    LABS:      Psychiatry Section:  Psychiatric Summary/[facility] admitting diagnosis: Bipolar 1 disorder, manic, moderate  Pt. admitted w/ cj w/ psychosis, severe thought disorder. Initially concerned w/ excitatory catatonia, which was ruled out. Improved significantly w/ Risperdal.     Psychiatric Recommendations:  Pt. stabilized on Risperdal 2mg AM and 3mg HS for bipolar disorder, cj. Pt. agreed to receive Invega Sustenna 234mg and 156mg loading doses. 1st dose was scheduled to be given 6/29, 2nd dose was scheduled for outpatient post discharge. Pt. should receive FULTON prior to discharge from [facility] for Hx of non-adherence; can taper and discontinue PO Risperdal if both loading doses are received prior to discharge. Suggested Invega Sustenna maintenance dose of 234mg. Ativan 1mg TID also for cj, currently being tapered w/ plan to discontinue prior to discharge. Stabilized on Depakote ER 1000mg HS; VPA level on 6/8 was 80.3. Psychiatric discharge date was planned for 7/1. Pt. should not require transfer back to [facility] from [facility].     MEDICATIONS  (STANDING):  Ativan 1mg TID (taper and D/C prior to discharge)  benztropine 1 milliGRAM(s) Oral at bedtime  diVALproex ER 1000 milliGRAM(s) Oral at bedtime  multivitamin 1 Tablet(s) Oral daily  risperiDONE  Disintegrating Tablet 2 milliGRAM(s) Oral daily  risperiDONE  Disintegrating Tablet 4 milliGRAM(s) Oral at bedtime    D/C medications:  Invega Sustenna 234mg maintenance dose (PO Risperdal conversion)  Depakote ER 1000mg HS  Cogentin 1mg HS  Multivitamin      Observation status (check one):   ( ) Constant Observation  ( ) Enhanced care  ( X) Routine checks    Risk Status (check all that apply if present):  ( ) at risk for suicide/self-injury  (X ) at risk for aggressive behavior when decompensated  ( ) at risk for elopement  ( ) other risk: [facility] Inpatient to ED Transfer Summary    Reason for Transfer/Medical Summary: 51 yr old female, Hx of HTN, anemia, breast cA s/p surgery (with ongoing oncologic care at [place]); hx of nontoxic goiter s/p thyroid surgery. Pt. being transferred to [facility] for admission to neuro service for DWI imaging of the brain revealing concern for hypercoagulable state 2/2 breast CA. There was concern that brain mets may be cause for disorganization and disorientation upon admission and is the initial reason for ordering MRI. Less concern for this now as pt. has responded to psychotropic medications, however, cannot be ruled out due to poor quality imaging.   Inpatient neurologist, Dr. Winston, coordinated transfer w/ stroke attending Dr. Dallas Olivarez [phone]; fellow is Dr. Leyla Hendrix.   See below for psych recommendations.      PAST MEDICAL & SURGICAL HISTORY:  Bipolar disorder      Anemia      Bipolar 1 disorder, manic, moderate      No significant past surgical history        Allergies    No Known Allergies    Intolerances        MEDICATIONS  (STANDING):  benztropine 1 milliGRAM(s) Oral at bedtime  diVALproex ER 1000 milliGRAM(s) Oral at bedtime  multivitamin 1 Tablet(s) Oral daily  risperiDONE  Disintegrating Tablet 2 milliGRAM(s) Oral daily  risperiDONE  Disintegrating Tablet 4 milliGRAM(s) Oral at bedtime    MEDICATIONS  (PRN):  acetaminophen     Tablet .. 650 milliGRAM(s) Oral every 6 hours PRN Severe Pain (7 - 10)  haloperidol     Tablet 5 milliGRAM(s) Oral every 6 hours PRN Agitation  haloperidol    Injectable 5 milliGRAM(s) IntraMuscular once PRN Severe agitation  LORazepam   Injectable 2 milliGRAM(s) IntraMuscular Once PRN severe agitation  melatonin. 5 milliGRAM(s) Oral at bedtime PRN Insomnia  nicotine  Polacrilex Gum 4 milliGRAM(s) Oral every 2 hours PRN smoking cessation      Vital Signs Last 24 Hrs  T(C): 36.5 (28 Jun 2022 14:18), Max: 36.7 (27 Jun 2022 19:07)  T(F): 97.7 (28 Jun 2022 14:18), Max: 98.1 (27 Jun 2022 19:07)  HR: --  BP: --  BP(mean): --  RR: --  SpO2: --  CAPILLARY BLOOD GLUCOSE            PHYSICAL EXAM:  GENERAL: NAD, well-developed  HEAD:  Atraumatic, Normocephalic  EYES: EOMI, PERRLA, conjunctiva and sclera clear  NECK: Supple, No JVD  CHEST/LUNG: Clear to auscultation bilaterally; No wheeze  HEART: Regular rate and rhythm; No murmurs, rubs, or gallops  ABDOMEN: Soft, Nontender, Nondistended; Bowel sounds present  EXTREMITIES:  2+ Peripheral Pulses, No clubbing, cyanosis, or edema  PSYCH: AAOx3  NEUROLOGY: non-focal  SKIN: No rashes or lesions    LABS:      Psychiatry Section:  Psychiatric Summary/[facility] admitting diagnosis: Bipolar 1 disorder, manic, moderate  Pt. admitted w/ cj w/ psychosis, severe thought disorder. Initially concerned w/ excitatory catatonia, which was ruled out. Improved significantly w/ Risperdal.     Psychiatric Recommendations:  Pt. stabilized on Risperdal 2mg AM and 3mg HS for bipolar disorder, cj. Pt. agreed to receive Invega Sustenna 234mg and 156mg loading doses. 1st dose was scheduled to be given 6/29, 2nd dose was scheduled for outpatient post discharge. Pt. should receive FULTON prior to discharge from [facility] for Hx of non-adherence; can taper and discontinue PO Risperdal if both loading doses are received prior to discharge. Suggested Invega Sustenna maintenance dose of 234mg. Ativan 1mg TID also for cj, currently being tapered w/ plan to discontinue prior to discharge. Stabilized on Depakote ER 1000mg HS; VPA level on 6/8 was 80.3. Psychiatric discharge date was planned for 7/1. Pt. should not require transfer back to [facility] from [facility].     MEDICATIONS  (STANDING):  Ativan 1mg TID (taper and D/C prior to discharge)  benztropine 1 milliGRAM(s) Oral at bedtime  diVALproex ER 1000 milliGRAM(s) Oral at bedtime  multivitamin 1 Tablet(s) Oral daily  risperiDONE  Disintegrating Tablet 2 milliGRAM(s) Oral daily  risperiDONE  Disintegrating Tablet 4 milliGRAM(s) Oral at bedtime    D/C medications:  Invega Sustenna 234mg maintenance dose (PO Risperdal conversion)  Depakote ER 1000mg HS  Cogentin 1mg HS  Multivitamin      Observation status (check one):   ( ) Constant Observation  ( ) Enhanced care  ( X) Routine checks    Risk Status (check all that apply if present):  ( ) at risk for suicide/self-injury  (X ) at risk for aggressive behavior when decompensated  ( ) at risk for elopement  ( ) other risk: [facility] Inpatient to ED Transfer Summary    Reason for Transfer/Medical Summary: 51 yr old female, Hx of HTN, anemia, breast cA s/p surgery (with ongoing oncologic care at [facility]); hx of nontoxic goiter s/p thyroid surgery. Pt. being transferred to [facility] for admission to neuro service for DWI imaging of the brain revealing concern for hypercoagulable state 2/2 breast CA. There was concern that brain mets may be cause for disorganization and disorientation upon admission and is the initial reason for ordering MRI. Less concern for this now as pt. has responded to psychotropic medications, however, cannot be ruled out due to poor quality imaging.   Inpatient neurologist, Dr. Winston, coordinated transfer w/ stroke attending Dr. Dallas Olivarez [phone]; fellow is Dr. Leyla Hendrix.   See below for psych recommendations.      PAST MEDICAL & SURGICAL HISTORY:  Bipolar disorder      Anemia      Bipolar 1 disorder, manic, moderate      No significant past surgical history        Allergies    No Known Allergies    Intolerances        MEDICATIONS  (STANDING):  benztropine 1 milliGRAM(s) Oral at bedtime  diVALproex ER 1000 milliGRAM(s) Oral at bedtime  multivitamin 1 Tablet(s) Oral daily  risperiDONE  Disintegrating Tablet 2 milliGRAM(s) Oral daily  risperiDONE  Disintegrating Tablet 4 milliGRAM(s) Oral at bedtime    MEDICATIONS  (PRN):  acetaminophen     Tablet .. 650 milliGRAM(s) Oral every 6 hours PRN Severe Pain (7 - 10)  haloperidol     Tablet 5 milliGRAM(s) Oral every 6 hours PRN Agitation  haloperidol    Injectable 5 milliGRAM(s) IntraMuscular once PRN Severe agitation  LORazepam   Injectable 2 milliGRAM(s) IntraMuscular Once PRN severe agitation  melatonin. 5 milliGRAM(s) Oral at bedtime PRN Insomnia  nicotine  Polacrilex Gum 4 milliGRAM(s) Oral every 2 hours PRN smoking cessation      Vital Signs Last 24 Hrs  T(C): 36.5 (28 Jun 2022 14:18), Max: 36.7 (27 Jun 2022 19:07)  T(F): 97.7 (28 Jun 2022 14:18), Max: 98.1 (27 Jun 2022 19:07)  HR: --  BP: --  BP(mean): --  RR: --  SpO2: --  CAPILLARY BLOOD GLUCOSE            PHYSICAL EXAM:  GENERAL: NAD, well-developed  HEAD:  Atraumatic, Normocephalic  EYES: EOMI, PERRLA, conjunctiva and sclera clear  NECK: Supple, No JVD  CHEST/LUNG: Clear to auscultation bilaterally; No wheeze  HEART: Regular rate and rhythm; No murmurs, rubs, or gallops  ABDOMEN: Soft, Nontender, Nondistended; Bowel sounds present  EXTREMITIES:  2+ Peripheral Pulses, No clubbing, cyanosis, or edema  PSYCH: AAOx3  NEUROLOGY: non-focal  SKIN: No rashes or lesions    LABS:      Psychiatry Section:  Psychiatric Summary/[facility] admitting diagnosis: Bipolar 1 disorder, manic, moderate  Pt. admitted w/ cj w/ psychosis, severe thought disorder. Initially concerned w/ excitatory catatonia, which was ruled out. Improved significantly w/ Risperdal.     Psychiatric Recommendations:  Pt. stabilized on Risperdal 2mg AM and 4mg HS for bipolar disorder, cj. Pt. agreed to receive Invega Sustenna 234mg and 156mg loading doses. 1st dose was scheduled to be given 6/29, 2nd dose was scheduled for outpatient post discharge. Pt. should receive FULTON prior to discharge from [facility] for Hx of non-adherence; can taper and discontinue PO Risperdal if both loading doses are received prior to discharge. Suggested Invega Sustenna maintenance dose of 234mg. Ativan 1mg TID also for cj, currently being tapered w/ plan to discontinue prior to discharge. Stabilized on Depakote ER 1000mg HS; VPA level on 6/8 was 80.3. Psychiatric discharge date was planned for 7/1. Pt. should not require transfer back to [facility] from [facility].     MEDICATIONS  (STANDING):  Ativan 1mg TID (taper and D/C prior to discharge)  benztropine 1 milliGRAM(s) Oral at bedtime  diVALproex ER 1000 milliGRAM(s) Oral at bedtime  multivitamin 1 Tablet(s) Oral daily  risperiDONE  Disintegrating Tablet 2 milliGRAM(s) Oral daily  risperiDONE  Disintegrating Tablet 4 milliGRAM(s) Oral at bedtime    D/C medications:  Invega Sustenna 234mg maintenance dose (PO Risperdal conversion)  Depakote ER 1000mg HS  Cogentin 1mg HS  Multivitamin      Observation status (check one):   ( ) Constant Observation  ( ) Enhanced care  ( X) Routine checks    Risk Status (check all that apply if present):  ( ) at risk for suicide/self-injury  (X ) at risk for aggressive behavior when decompensated  ( ) at risk for elopement  ( ) other risk:

## 2022-06-28 NOTE — BH INPATIENT PSYCHIATRY DISCHARGE NOTE - LEGAL HISTORY
remote hx of arrest at age 21 for something involving drugs. per [facility] [employer] [facility] [employer]/ [employer] site: no pending legal cases

## 2022-06-28 NOTE — BH INPATIENT PSYCHIATRY PROGRESS NOTE - NSBHATTESTAPPBILLTIME_PSY_A_CORE
I attest my time as ANTOINE is greater than 50% of the total combined time spent on qualifying patient care activities. I have reviewed and verified the documentation.

## 2022-06-28 NOTE — BH INPATIENT PSYCHIATRY PROGRESS NOTE - NSBHMSEAFFCONG_PSY_A_CORE
Non-congruent

## 2022-06-28 NOTE — BH INPATIENT PSYCHIATRY PROGRESS NOTE - NSBHMSEPERCEPT_PSY_A_CORE
Other/Unable to assess
Auditory hallucinations/Visual hallucinations
No abnormalities/Unable to assess
Other/Unable to assess
Auditory hallucinations/Visual hallucinations
Other/Unable to assess
Other/Unable to assess
Auditory hallucinations/Visual hallucinations
Other/Unable to assess

## 2022-06-28 NOTE — BH INPATIENT PSYCHIATRY PROGRESS NOTE - NSTXDCOTHRDATEEST_PSY_ALL_CORE
07-Jun-2022
14-Jun-2022
21-Jun-2022
21-Jun-2022
07-Jun-2022
14-Jun-2022
14-Jun-2022
21-Jun-2022
14-Jun-2022
21-Jun-2022
07-Jun-2022
21-Jun-2022
21-Jun-2022
14-Jun-2022
21-Jun-2022
28-Jun-2022
07-Jun-2022
21-Jun-2022
14-Jun-2022

## 2022-06-28 NOTE — BH INPATIENT PSYCHIATRY DISCHARGE NOTE - OTHER PAST PSYCHIATRIC HISTORY (INCLUDE DETAILS REGARDING ONSET, COURSE OF ILLNESS, INPATIENT/OUTPATIENT TREATMENT)
extensive psychiatric hx significant for Bipolar disorder vs Schizoaffective disorder- bipolar type   multiple prior inpatient psychiatric admissions ((including 4 at [facility]: 7/2017, 6/2017, 12/2015 and 7/2006)  on follow up with Dr JAMIE Yanez, last evaluated 4/25/2022  been attending our [facility] since 7/20/2017

## 2022-06-28 NOTE — BH INPATIENT PSYCHIATRY PROGRESS NOTE - NSBHMSERELATED_PSY_A_CORE
Poor
Poor
Fair
Poor
Poor
Fair
Poor
Fair
Poor

## 2022-06-28 NOTE — H&P ADULT - ATTENDING COMMENTS
Ms. Ulrich this 51-year-old woman with significant past medical history of anemia, breast cancer (ductal carcinoma, s/p right lumpectomy).  She was transferred from [facility] where she was receiving inpatient treatment for bipolar disorder.  She was found to be in altered sensorium, and MRI of brain revealed multiple vascular territory punctate diffusion hyperintense lesions suspicious for subacute infarction versus brain metastasis from breast cancer.  Imaging was limited therefore she was transferred to [place] for completion of diagnostic work-up.  Today on neurological exam she is alert, awake oriented x4 feels well and has no focal neurologic deficit.  Impression: Multiple vascular territory punctate infarction bilateral cerebral hemispheres.  Possible etiology cardioembolic versus hypercoagulable state from malignancy    MRI brain with and without contrast pending will be done under anesthesia next line her current lab work revealed abnormal elevated IgM and IgG G antibody, anticardiolipin.  Beta-2 glycoprotein screen was also positive however IgA IgG and IgM titers were within normal limits.  we will have hematology consultation although above labs may be abnormal in the setting of acute ischemic infarct, likely will repeat lab work.  If hematology agrees then will empirically place patient on anticoagulation with DOAC for secondary stroke prevention.  Consult cardiology/EP for loop recorder–may not be necessary if she has to be on definitive anticoagulation.  Appreciate social work and Psych input for home d/c

## 2022-06-28 NOTE — BH INPATIENT PSYCHIATRY PROGRESS NOTE - NSBHMSEINSIGHT_PSY_A_CORE
Poor
Fair
Poor

## 2022-06-28 NOTE — BH INPATIENT PSYCHIATRY PROGRESS NOTE - LEVEL OF CONSCIOUSNESS
Alert
Lethargic, arousable to verbal stimulus
Alert
Alert

## 2022-06-28 NOTE — BH INPATIENT PSYCHIATRY PROGRESS NOTE - NSBHMSEMOVE_PSY_A_CORE
No abnormal movements

## 2022-06-28 NOTE — BH INPATIENT PSYCHIATRY PROGRESS NOTE - NSBHMSEATTEN_PSY_A_CORE
Impaired
Normal
Impaired
Normal
Impaired

## 2022-06-28 NOTE — BH INPATIENT PSYCHIATRY PROGRESS NOTE - NSBHFUPINTERVALHXFT_PSY_A_CORE
Patient is followed up for cj, psychosis. Chart, medications and labs reviewed.  Patient is discussed with nursing staff.  Per nursing report no interval events.  She remains compliant with all standing medications and tolerating well. Pt. reports adequate sleep and normal appetite.   Patient is observed in dayroom bright affect, Patient reports mood as “ok.” The pt. was able to remember the writer despite more than one week passing since last encounter; however, some memory impairment remains. She is disoriented to day and situation, stating she is unable to recall why she was admitted and the hospital course. She denied S/i/p, H/i/p, A/H, V/H and delusions. The pt. is agreeable to Invega Sustenna FULTON, however not willing to remain inpatient for both loading doses. She agreed to 2nd loading dose outpatient.    Repeat MRI reviewed by neurology. Although image quality is poor due to pt. movement, there are concerns for hypercoagulable state 2/2 to Hx of cancer. It is recommended the pt. be transferred to [facility] for further evaluation. Pt. agreed to transfer. Scheduled to transfer to [facility] ED and admission to neuro service.

## 2022-06-28 NOTE — BH INPATIENT PSYCHIATRY PROGRESS NOTE - NSICDXBHSECONDARYDX_PSY_ALL_CORE
History of breast cancer in female   Z85.3  

## 2022-06-28 NOTE — BH INPATIENT PSYCHIATRY PROGRESS NOTE - NSBHFUPMEDSE_PSY_A_CORE
None known
Unable to assess
None known
Unable to assess
None known
Unable to assess
None known

## 2022-06-28 NOTE — BH INPATIENT PSYCHIATRY PROGRESS NOTE - NSTXDCOTHRDATETRGT_PSY_ALL_CORE
28-Jun-2022
28-Jun-2022
14-Jun-2022
21-Jun-2022
05-Jul-2022
14-Jun-2022
28-Jun-2022
21-Jun-2022
21-Jun-2022
28-Jun-2022
28-Jun-2022
14-Jun-2022
28-Jun-2022
28-Jun-2022
14-Jun-2022
21-Jun-2022
21-Jun-2022
28-Jun-2022
14-Jun-2022
14-Jun-2022
21-Jun-2022

## 2022-06-28 NOTE — ED PROVIDER NOTE - NS ED ROS FT
Continue statin.      Constitutional:  (-) fever, (-) chills, (-) lethargy  Eyes:  (-) eye pain (-) visual changes  ENMT: (-) nasal discharge, (-) sore throat. (-) neck pain or stiffness  Cardiac: (-) chest pain (-) palpitations  Respiratory:  (-) cough (-) respiratory distress.   GI:  (-) nausea (-) vomiting (-) diarrhea (-) abdominal pain.  :  (-) dysuria (-) frequency (-) burning.  MS:  (-) back pain (-) joint pain.  Neuro:  (-) headache (-) numbness (-) tingling (-) focal weakness  Skin:  (-) rash  Except as documented in the HPI,  all other systems are negative

## 2022-06-28 NOTE — BH INPATIENT PSYCHIATRY PROGRESS NOTE - NSBHMSETHTCONTENT_PSY_A_CORE
Delusions/Unable to assess
Delusions
Delusions
Delusions/Unable to assess
Unremarkable
Delusions
Delusions/Unable to assess
Delusions
Delusions/Unable to assess

## 2022-06-28 NOTE — ED PROVIDER NOTE - PHYSICAL EXAMINATION
CONSTITUTIONAL: well-appearing, in NAD  SKIN: Warm dry, normal skin turgor  HEAD: NCAT  EYES: EOMI, PERRLA, no scleral icterus, conjunctiva pink  ENT: normal pharynx with no erythema or exudates  NECK: Supple; non tender. Full ROM.  CARD: RRR, no murmurs.  RESP: clear to ausculation b/l. No crackles or wheezing.  ABD: non-distended  MSK: No pedal edema, no calf tenderness  NEURO: normal motor. normal sensory. Cerebellar testing normal.   PSYCH: Cooperative, appropriate.

## 2022-06-28 NOTE — BH INPATIENT PSYCHIATRY PROGRESS NOTE - NSBHASSESSSUMMFT_PSY_ALL_CORE
51 yr old female, single, domiciled and unemployed.  currently, in [facility] for [facility]. other diagnoses as per Psyckes include: SAD and unspecified bipolar disorder.  hx of poor compliance to meds and psych aftercare.  Has had prior psych admissions; as per Psyckes: no other recent psych admissions.  Per chart review, there has been no documented hx of SA; has hx of cannabis abuse.  Pertinent medical issues include: anemia, HTN, breast cA s/p surgery (with ongoing oncologic care at [place]); hx of nontoxic goiter s/p thyroid surgery.  Today, presented to the ED BIB EMS after a bystander called 911 as Pt was undressing in public.    Improving linearity  and mood; memory impairment still assessed but has improved. Less disorganized. Disoriented to date and situation. Medication adherent and tolerating them well.   Repeat MRI reviewed by neurology. Although image quality is poor due to pt. movement, there are concerns for hypercoagulable state 2/2 to Hx of cancer. It is recommended the pt. be transferred to [facility] for further evaluation. Pt. agreed to transfer. Scheduled to transfer to [facility] ED and admission to neuro service.    Medication reconciliation completed prior to transfer. Continue Risperdal PO. Pt. agreeable to Invega Sustenna FULTON. Recommendations for FULTON administration documented in transfer note. Taper Ativan to 1mg TID, plan to discontinue prior to D/C. Continue Depakote ER. Pt. was scheduled for D/c 7/1.     Plan:  Psychiatry:   Depakote ER 1000mg HS for mood  Ativan 1mg TID for cj/agitation  Risperidone 2mg qAM + 4mg qhs for mood/psychosis  	Obtaining prior authorization for FULTON  LORazepam   Injectable 2 milliGRAM(s) IntraMuscular Once PRN severe agitation  melatonin. 5 milliGRAM(s) Oral at bedtime PRN Insomnia  nicotine  Polacrilex Gum 4 milliGRAM(s) Oral every 2 hours PRN smoking cessation    Observations: routine observation  Medical: Abnormal signaling on brain MR; concern for hypercoagulation 2/2 breast CA. Transfer to neuro service at [facility]  	Hx of breast CA, thyroid mass: Tx at [facility]  	Hypokalemia: resolved  	UTI: Bactrim 160mg/800mg BID x5 days--completed  Dispo: [facility] ED; pending admission to neuro   51 yr old female, single, domiciled and unemployed.  currently, in treatment at [facility] for bipolar disorder. other diagnoses as per Psyckes include: SAD and unspecified bipolar disorder.  hx of poor compliance to meds and psych aftercare.  Has had prior psych admissions; as per Psyckes: no other recent psych admissions.  Per chart review, there has been no documented hx of SA; has hx of cannabis abuse.  Pertinent medical issues include: anemia, HTN, breast cA s/p surgery (with ongoing oncologic care at [place]); hx of nontoxic goiter s/p thyroid surgery.  Today, presented to the ED BIB EMS after a bystander called 911 as Pt was undressing in public.    Improving linearity  and mood; memory impairment still assessed but has improved. Less disorganized. Disoriented to date and situation. Medication adherent and tolerating them well.   Repeat MRI reviewed by neurology. Although image quality is poor due to pt. movement, there are concerns for hypercoagulable state 2/2 to Hx of cancer. It is recommended the pt. be transferred to [facility] for further evaluation. Pt. agreed to transfer. Scheduled to transfer to [facility] ED and admission to neuro service.    Medication reconciliation completed prior to transfer. Continue Risperdal PO. Pt. agreeable to Invega Sustenna FULTON. Recommendations for FULTON administration documented in transfer note. Taper Ativan to 1mg TID, plan to discontinue prior to D/C. Continue Depakote ER. Pt. was scheduled for D/c 7/1.     Plan:  Psychiatry:   Cogentin 1mg HS for EPS PPx  Depakote ER 1000mg HS for mood  Ativan 1mg TID for cj/agitation  Risperidone 2mg qAM + 4mg qhs for mood/psychosis  	Plan for Invega Sustenna--covered by insurance  melatonin. 5 milliGRAM(s) Oral at bedtime PRN Insomnia  nicotine  Polacrilex Gum 4 milliGRAM(s) Oral every 2 hours PRN smoking cessation    Observations: routine observation  Medical: Abnormal signaling on brain MR; concern for hypercoagulation 2/2 breast CA. Transfer to neuro service at [facility]  	Hx of breast CA, thyroid mass: Tx at [facility]  	Hypokalemia: resolved  	UTI: Bactrim 160mg/800mg BID x5 days--completed  Dispo: [facility] ED; pending admission to neuro

## 2022-06-28 NOTE — BH INPATIENT PSYCHIATRY PROGRESS NOTE - NSBHMSEJUDGE_PSY_A_CORE
Poor
Fair

## 2022-06-28 NOTE — BH INPATIENT PSYCHIATRY PROGRESS NOTE - NSDCCRITERIA_PSY_ALL_CORE
Sx reduction/remission,  risk of harm

## 2022-06-28 NOTE — BH PSYCHOLOGY - GROUP THERAPY NOTE - NSPSYCHOLGRPCOGPROB_PSY_A_CORE FT
Anxiety, Depression, Emotion Dysregulation, Lack of coping skills, Psycho-social stressors, self-care, problem solving

## 2022-06-28 NOTE — BH INPATIENT PSYCHIATRY PROGRESS NOTE - PRN MEDS
MEDICATIONS  (PRN):  acetaminophen     Tablet .. 650 milliGRAM(s) Oral every 6 hours PRN Severe Pain (7 - 10)  haloperidol     Tablet 5 milliGRAM(s) Oral every 6 hours PRN Agitation  haloperidol    Injectable 5 milliGRAM(s) IntraMuscular once PRN Severe agitation  LORazepam   Injectable 2 milliGRAM(s) IntraMuscular Once PRN severe agitation  melatonin. 5 milliGRAM(s) Oral at bedtime PRN Insomnia  nicotine  Polacrilex Gum 4 milliGRAM(s) Oral every 2 hours PRN smoking cessation

## 2022-06-28 NOTE — BH INPATIENT PSYCHIATRY PROGRESS NOTE - NSTXMEDICPROGRES_PSY_ALL_CORE
Improving
Met - goal discontinued
Improving

## 2022-06-28 NOTE — BH INPATIENT PSYCHIATRY PROGRESS NOTE - NSBHMSEGROOM_PSY_A_CORE
Fair
Poor
Fair
Poor
Fair
Fair
Poor
Fair
Poor

## 2022-06-28 NOTE — BH INPATIENT PSYCHIATRY PROGRESS NOTE - NSTXMEDICDATETRGT_PSY_ALL_CORE
22-Jun-2022
15-Mahesh-2022
22-Jun-2022
15-Mahesh-2022
22-Jun-2022
15-Mahesh-2022
14-Jun-2022
22-Jun-2022
15-Mahesh-2022
22-Jun-2022
05-Jul-2022

## 2022-06-28 NOTE — BH INPATIENT PSYCHIATRY PROGRESS NOTE - NSTXMEDICDATEEST_PSY_ALL_CORE
15-Mahesh-2022
07-Jun-2022
15-Mahesh-2022
08-Jun-2022
15-Mahesh-2022
08-Jun-2022
08-Jun-2022
15-Mahesh-2022
08-Jun-2022
08-Jun-2022
21-Jun-2022
15-Mahesh-2022
08-Jun-2022
15-Mahesh-2022

## 2022-06-28 NOTE — BH INPATIENT PSYCHIATRY PROGRESS NOTE - NSICDXBHTERTIARYDX_PSY_ALL_CORE
R/O Breast cancer metastasized to brain   C50.919  R/O Paraneoplastic neurologic disorder   G98.8  

## 2022-06-28 NOTE — BH INPATIENT PSYCHIATRY PROGRESS NOTE - NSBHCONSBHPROVDETAILS_PSY_A_CORE  FT
as stated above

## 2022-06-28 NOTE — BH INPATIENT PSYCHIATRY PROGRESS NOTE - NSBHMSETHTPROC_PSY_A_CORE
Disorganized/Flight of ideas/Illogical
Disorganized/Illogical
Linear/Disorganized/Tangential/Illogical
Disorganized/Flight of ideas/Illogical
Disorganized/Flight of ideas/Illogical
Disorganized/Illogical
Disorganized/Flight of ideas/Illogical
Disorganized/Illogical
Impaired reasoning/Other
Disorganized/Flight of ideas/Illogical
Linear/Disorganized/Tangential/Illogical
Disorganized/Flight of ideas/Illogical
Linear/Disorganized/Tangential/Illogical
Linear/Disorganized/Tangential/Illogical

## 2022-06-28 NOTE — BH PSYCHOLOGY - GROUP THERAPY NOTE - TOKEN PULL-DIAGNOSIS
Primary Diagnosis:  Bipolar I, recurrent manic episode [F31.10]        Problem Dx:   History of breast cancer in female [Z85.3]

## 2022-06-28 NOTE — H&P ADULT - ASSESSMENT
Assessment: 50 yo RH female with a PMHx of anemia, HTN, breast cancer (invasive ductal carcinoma; 2020 s/p R breast lumpectomy but patient declined further aftercare/treatment), and h/o nontoxic goiter (s/p thyroidectomy) and with a PPHx of bipolar I disorder who presents to [facility] ED as a transfer from [facility] due to concern for possible acute infarct seen on MRI Head. Patient was initially brought to [facility] ED on 06/06 after a bystander called 911 as the patient was undressing herself in public. Patient was then transferred to [facility] due to concern for manic episode. Patient had 2 MRI Head attempts at [facility], but both were motion limited. 06/27 MRI Head with concern for areas of abnormal signal involving the diffusion weighted sequence. It is unclear whether these are due to acute infarcts versus underlying lesions.    Impression: MRI Head with concern for punctate acute infarct in multiple vascular territories, mechanism ESUS (but suspect due to hypercoagulability of malignancy) vs underlying metastatic lesions in patient with invasive ductal carcinoma w/o radiation treatment.    Plan:  [] Admit to stroke service, 4 [place] (observation room).  [] Neuro checks, vitals Q4HR.  [] Telemetry.  [] Fall/Aspiration precautions.  [] Keep NPO unless patient passes bedside dysphagia screen. Will need formal S&S eval if fails.   [] BP Goal: Normotension.  [] MRI brain w/wo, under conscious sedation.  [] MRA Head w/o contrast, MRA Neck w/ contrast (both under conscious sedation).  [] Stroke in the young workup (CT C/A/P all w/ constast, Anticardiolipin Ab, Beta-2-GP-1 Ab, Lupus AC, ATIII, Protein C/S, Factor V, Homocysteine, TTE or GUSTABO).   [] Start ASA 81MG PO QD (300MG ID QD if fails dysphagia screen).   [] Start Plavix 75MG PO QD for now.  [] Start Atorvastatin 80MG PO QHS (titrate to goal LDL < 70).  [] c/w home dose Divalproex ER 1000MG PO QHS, Cogentin 1MG PO QHS, Melatonin 5MG PO QHS (recommended by psychiatry).  [] Will follow up with Psychiatry regarding timing/dosing of Invega Sustenna.  [] Send Lipid panel, HbA1c, TSH, B12, Folate, UA.  [] PT/OT evaluations.   [] DVT PPx: Lovenox 40MG SC QD.  [] Diet: NPO unless passes bedside dysphagia screen.    Patient discussed with stroke fellow Dr. Escudero. Final recommendations regarding management to be confirmed upon review by stroke attending Dr. Tomlinson.     Assessment: 50 yo RH female with a PMHx of anemia, HTN, breast cancer (invasive ductal carcinoma; 2020 s/p R breast lumpectomy but patient declined further aftercare/treatment), and h/o nontoxic goiter (s/p thyroidectomy) and with a PPHx of bipolar I disorder who presents to [facility] ED as a transfer from [facility] due to concern for possible acute infarct seen on MRI Head. Patient was initially brought to [facility] ED on 06/06 after a bystander called 911 as the patient was undressing herself in public. Patient was then transferred to [facility] due to concern for manic episode. Patient had 2 MRI Head attempts at [facility], but both were motion limited. 06/27 MRI Head with concern for areas of abnormal signal involving the diffusion weighted sequence. It is unclear whether these are due to acute infarcts versus underlying lesions.    Impression: MRI Head with concern for punctate acute infarct in multiple vascular territories, mechanism ESUS (but suspect due to hypercoagulability of malignancy) vs underlying metastatic lesions in patient with invasive ductal carcinoma w/o radiation treatment.    Plan:  [] Admit to stroke service, 4 [place] (observation room).  [] Neuro checks, vitals Q4HR.  [] Telemetry.  [] Fall/Aspiration precautions.  [] Keep NPO unless patient passes bedside dysphagia screen. Will need formal S&S eval if fails.   [] BP Goal: Normotension.  [] MRI brain w/wo, under conscious sedation.  [] MRA Head w/o contrast, MRA Neck w/ contrast (both under conscious sedation).  [] Stroke in the young workup (CT C/A/P all w/ constast, Anticardiolipin Ab, Beta-2-GP-1 Ab, Lupus AC, ATIII, Protein C/S, Factor V, Homocysteine, TTE or GUSTABO).   [] Start ASA 81MG PO QD (300MG ME QD if fails dysphagia screen).   [] Start Plavix 75MG PO QD for now.  [] Start Atorvastatin 80MG PO QHS (titrate to goal LDL < 70).  [] c/w home dose Divalproex ER 1000MG PO QHS, Cogentin 1MG PO QHS, Melatonin 5MG PO QHS (recommended by psychiatry).  [] Will follow up with Psychiatry regarding timing/dosing of Invega Sustenna.  [] Send Lipid panel, HbA1c, TSH, B12, Folate, UA.  [] PT/OT evaluations.   [] DVT PPx: Lovenox 40MG SC QD.  [] Diet: NPO for possible MR imaging under conscious sedation tomorrow.    Patient discussed with stroke fellow Dr. Escudero. Final recommendations regarding management to be confirmed upon review by stroke attending Dr. Tomlinson.

## 2022-06-28 NOTE — BH INPATIENT PSYCHIATRY PROGRESS NOTE - NSTXMANICGOALOTHER_PSY_ALL_CORE
Maintain behavioral control

## 2022-06-28 NOTE — BH INPATIENT PSYCHIATRY DISCHARGE NOTE - NSBHMETABOLIC_PSY_ALL_CORE_FT
BMI: BMI (kg/m2): 25.8 (06-28-22 @ 19:48)  HbA1c: A1C with Estimated Average Glucose Result: 5.2 % (06-06-22 @ 17:20)    Glucose:   BP: --  Lipid Panel: Date/Time: 06-08-22 @ 15:36  Cholesterol, Serum: 206  Direct LDL: --  HDL Cholesterol, Serum: 47  Total Cholesterol/HDL Ration Measurement: --  Triglycerides, Serum: 80

## 2022-06-28 NOTE — BH INPATIENT PSYCHIATRY PROGRESS NOTE - NSTXDCOTHRPROGRES_PSY_ALL_CORE
Improving
No Change
Improving
No Change
Improving
No Change
Improving
Improving
No Change

## 2022-06-28 NOTE — H&P ADULT - HISTORY OF PRESENT ILLNESS
50 yo RH female with a PMHx of anemia, HTN, breast cancer (invasive ductal carcinoma; 2020 s/p R breast lumpectomy but patient declined further aftercare/treatment), and h/o nontoxic goiter (s/p thyroidectomy) and with a PPHx of bipolar I disorder who presents to [facility] ED as a transfer from [facility] due to concern for possible acute infarct seen on MRI Head. Patient was initially brought to [facility] ED on 06/06 after a bystander called 911 as the patient was undressing herself in public. Patient was then transferred to [facility] due to concern for manic episode. Patient was noted to have waxing/waning AMS and was evaluated by medicine team at [facility] due to concern for delirium. During her hospitalization at [facility], patient was treated for a UTI with 5-day course of Bactrim. Patient was also evaluated by neurology at [facility] due to concern for possible superimposed encephalopathy. Given concern for possible CNS metastatic breast cancer, MRI Head w/wo was ordered. Patient first attempted MRI on 06/16, but study was significantly motion limited/incomplete with concern for possible subacute infarcts vs metastasis in the L occipital lobe. MRI was attempted a second time on 06/27. This was again a very motion-limited study, but showed areas of abnormal signal involving DWI sequence concerning for acute infarcts vs underlying metastatic lesions. Patient was transferred to [facility] for admission to stroke service and further workup of possible acute infarcts. Patient does not know why she was at [facility], but was able to state she has been sent to [facility] because "they found something in my brain." She notes she has been having a lot of falls over the past ~3 weeks, most recently about 1 week ago. She is not sure if the falls happened because of a gait disturbance. At baseline, patient ambulates without assistance/assistive device and is independent with all ADLs. 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 Patient denies HA, dizziness, vision changes, speech changes, numbness/tingling, weakness.    NIHSS: 0  MRS: 0

## 2022-06-28 NOTE — BH INPATIENT PSYCHIATRY PROGRESS NOTE - NSBHMSEIMPULSE_PSY_A_CORE
Impaired
Normal

## 2022-06-28 NOTE — BH INPATIENT PSYCHIATRY PROGRESS NOTE - NSTXMANICDATETRGT_PSY_ALL_CORE
21-Jun-2022
15-Mahesh-2022
15-Mahesh-2022
21-Jun-2022
22-Jun-2022
22-Jun-2022
21-Jun-2022
15-Mahesh-2022
15-Mahesh-2022
22-Jun-2022
21-Jun-2022
15-Mahesh-2022
22-Jun-2022
15-Mahesh-2022
21-Jun-2022
22-Jun-2022
15-Mahesh-2022

## 2022-06-28 NOTE — BH INPATIENT PSYCHIATRY PROGRESS NOTE - NSBHMSEBEHAV_PSY_A_CORE
Other
Cooperative
Other
Other
Cooperative
Cooperative
Other
Cooperative
Other
Other
Cooperative
Other

## 2022-06-28 NOTE — BH INPATIENT PSYCHIATRY PROGRESS NOTE - NSBHMSEBODY_PSY_A_CORE
Average build

## 2022-06-28 NOTE — H&P ADULT - NSHPPHYSICALEXAM_GEN_ALL_CORE
Vital Signs Last 24 Hrs  T(C): 36.2 (28 Jun 2022 19:48), Max: 36.8 (28 Jun 2022 19:19)  T(F): 97.2 (28 Jun 2022 19:48), Max: 98.2 (28 Jun 2022 19:19)  HR: 90 (28 Jun 2022 19:48) (90 - 90)  BP: 105/70 (28 Jun 2022 19:48) (105/70 - 105/70)  RR: 12 (28 Jun 2022 19:48) (12 - 12)  SpO2: 97% (28 Jun 2022 19:48) (97% - 97%)    General Exam:  Constitutional: Lying on stretcher, NAD.  Head: Normocephalic, atraumatic.  Neck: Supple.  Extremities: No edema.    Neuro Exam:   MS: Alert, eyes open spontaneously. Oriented to self, age, location, month, President. States year is "2122." Speech is fluent, not slurred. Able to name objects and repeat. Follows commands.  CN: PERRL. VFF. EOMI. V1-V3 intact. Face symmetric. Tongue midline.   Motor: All extremities antigravity without drift.   Sensory: Intact to LT throughout. No extinction.  Coordination: No dysmetria on FNF or on HTS bilaterally.  Gait: Deferred.

## 2022-06-28 NOTE — BH INPATIENT PSYCHIATRY PROGRESS NOTE - NSTXPROBDCOTHR_PSY_ALL_CORE
DISCHARGE ISSUE - OTHER

## 2022-06-28 NOTE — BH INPATIENT PSYCHIATRY PROGRESS NOTE - NSBHATTESTTYPEVISIT_PSY_A_CORE
Attending Only
On-site Attending supervising ANTOINE ([id number] codes)
ANTOINE without on-site Attending supervision
ANTOINE without on-site Attending supervision
Attending Only
Attending Only
ANTOINE without on-site Attending supervision
Attending Only
Attending Only

## 2022-06-28 NOTE — BH PSYCHOLOGY - GROUP THERAPY NOTE - NSPSYCHOLGRPCOGINT_PSY_A_CORE FT
Cognitive/behavioral therapy, Acceptance and Commitment Therapy, Emotional regulation/coping skills taught, psychoeducation

## 2022-06-28 NOTE — BH PSYCHOLOGY - GROUP THERAPY NOTE - NSPSYCHOLGRPCOGPT_PSY_A_CORE FT
Patient attended Cognitive Behavioral Therapy group. The group utilized concepts from Acceptance Commitment Therapy. The group started with a brief check-in and mindfulness/relaxation exercise/discussion. This activity generated insightful participation and patients responded well to the Acceptance Commitment Therapy prompt. Patient discussed the concepts of distress tolerance and shared examples of healthy ways to cope with distress and ways to engage in healthy self-care. Next, the group discussed what it means to be mindful and how can one practice mindfulness on the unit and in their lives. Lastly, the group discussed ways to practice cognitive diffusion techniques. Group leaders explained concepts, reinforced participation, and engaged patients in the discussion.

## 2022-06-28 NOTE — BH INPATIENT PSYCHIATRY PROGRESS NOTE - NSBHMSEEYE_PSY_A_CORE
Poor
Fair
Fair
Poor
Fair
Poor
Other
Other
Fair
Fair
Poor
Poor
Other
Poor
Fair
Other
Poor
Poor
Fair

## 2022-06-28 NOTE — BH INPATIENT PSYCHIATRY PROGRESS NOTE - NSBHMSETHTASSOC_PSY_A_CORE
Loose
Normal
Loose

## 2022-06-28 NOTE — BH INPATIENT PSYCHIATRY PROGRESS NOTE - NSTXMANICDATEEST_PSY_ALL_CORE
14-Jun-2022
15-Mahesh-2022
08-Jun-2022
08-Jun-2022
14-Jun-2022
15-Mahesh-2022
14-Jun-2022
08-Jun-2022
14-Jun-2022
15-Mahesh-2022
08-Jun-2022
08-Jun-2022
15-Mahesh-2022
14-Jun-2022
08-Jun-2022
14-Jun-2022
08-Jun-2022
14-Jun-2022
15-Mahesh-2022

## 2022-06-28 NOTE — H&P ADULT - NSHPSOCIALHISTORY_GEN_ALL_CORE
Lives alone, unemployed. 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 Prior history of alcohol use (~8 years ago). History of smoking (quit ~2 months ago per patient). History of marijuana use.

## 2022-06-28 NOTE — BH INPATIENT PSYCHIATRY PROGRESS NOTE - NSTXDCOTHRGOAL_PSY_ALL_CORE
Pt will present with a decrease in psychosis and disorganization as evident by an overall improvement in functioning x 7 days.
Pt will show a reduction of disorganization and engage meaningfully with writer to identify a safe discharge plan. Pt will comply with recommended tx plan and medications for 5 days, identify 2 benefits for adhering to tx.
Pt will present with a decrease in psychosis and disorganization as evident by an overall improvement in functioning x 7 days.
Pt will show a reduction of disorganization and engage meaningfully with writer to identify a safe discharge plan. Pt will comply with recommended tx plan and medications for 5 days, identify 2 benefits for adhering to tx.
Pt will present with a decrease in psychosis and disorganization as evident by an overall improvement in functioning x 7 days.
Pt will show a reduction of disorganization and engage meaningfully with writer to identify a safe discharge plan. Pt will comply with recommended tx plan and medications for 5 days, identify 2 benefits for adhering to tx.
Pt will present with a decrease in psychosis and disorganization as evident by an overall improvement in functioning x 7 days.
Pt will show a reduction of disorganization and engage meaningfully with writer to identify a safe discharge plan. Pt will comply with recommended tx plan and medications for 5 days, identify 2 benefits for adhering to tx.
Pt will show a reduction of disorganization and engage meaningfully with writer to identify a safe discharge plan. Pt will comply with recommended tx plan and medications for 5 days, identify 2 benefits for adhering to tx.
Pt will present with a decrease in psychosis and disorganization as evident by an overall improvement in functioning x 7 days.

## 2022-06-28 NOTE — BH INPATIENT PSYCHIATRY PROGRESS NOTE - NSBHATTESTBILLINGAW_PSY_A_CORE
[id number] Inpatient care - moderate complexity - 25 minutes
[id number]-Ljexzlvspj Inpatient care - moderate complexity - 25 minutes
[id number]-Aiecwiahsb Inpatient care - moderate complexity - 25 minutes
[id number]-Ivkvhrkdbj Inpatient care - moderate complexity - 25 minutes
[id number]-Limakknwpa Inpatient care - high complexity - 35 minutes
[id number]-Jxnybtnxfw Inpatient care - moderate complexity - 25 minutes
[id number]-Iwynazgmyc Inpatient care - moderate complexity - 25 minutes
[id number]-Tncrkszcni Inpatient care - moderate complexity - 25 minutes
[id number]-Kwzcbstsyw Inpatient care - moderate complexity - 25 minutes

## 2022-06-29 ENCOUNTER — TRANSCRIPTION ENCOUNTER (OUTPATIENT)
Age: 52
End: 2022-06-29

## 2022-06-29 LAB
AMMONIA BLD-MCNC: 37 UMOL/L — SIGNIFICANT CHANGE UP (ref 11–55)
APPEARANCE UR: CLEAR — SIGNIFICANT CHANGE UP
AT III ACT/NOR PPP CHRO: 86 % — SIGNIFICANT CHANGE UP (ref 85–135)
B2 GLYCOPROT1 AB SER QL: POSITIVE
BACTERIA # UR AUTO: NEGATIVE — SIGNIFICANT CHANGE UP
BILIRUB UR-MCNC: NEGATIVE — SIGNIFICANT CHANGE UP
COLOR SPEC: SIGNIFICANT CHANGE UP
CRP SERPL-MCNC: 6 MG/L — HIGH (ref 0–4)
DIFF PNL FLD: NEGATIVE — SIGNIFICANT CHANGE UP
DRVVT SCREEN TO CONFIRM RATIO: SIGNIFICANT CHANGE UP
EPI CELLS # UR: 3 /HPF — SIGNIFICANT CHANGE UP
ERYTHROCYTE [SEDIMENTATION RATE] IN BLOOD: 31 MM/HR — HIGH (ref 0–15)
FOLATE SERPL-MCNC: 17.2 NG/ML — SIGNIFICANT CHANGE UP
GLUCOSE UR QL: NEGATIVE — SIGNIFICANT CHANGE UP
HCG SERPL-ACNC: <2 MIU/ML — SIGNIFICANT CHANGE UP
HCYS SERPL-MCNC: 10.7 UMOL/L — SIGNIFICANT CHANGE UP
KETONES UR-MCNC: ABNORMAL
LA NT DPL PPP QL: 37.8 SEC — SIGNIFICANT CHANGE UP
LEUKOCYTE ESTERASE UR-ACNC: ABNORMAL
NITRITE UR-MCNC: NEGATIVE — SIGNIFICANT CHANGE UP
PH UR: 7 — SIGNIFICANT CHANGE UP (ref 5–8)
PROT C ACT/NOR PPP: 112 % — SIGNIFICANT CHANGE UP (ref 74–150)
PROT UR-MCNC: NEGATIVE — SIGNIFICANT CHANGE UP
RBC CASTS # UR COMP ASSIST: 0 /HPF — SIGNIFICANT CHANGE UP (ref 0–4)
SARS-COV-2 RNA SPEC QL NAA+PROBE: SIGNIFICANT CHANGE UP
SP GR SPEC: 1.01 — SIGNIFICANT CHANGE UP (ref 1.01–1.02)
TSH SERPL-MCNC: 16.8 UIU/ML — HIGH (ref 0.27–4.2)
UROBILINOGEN FLD QL: NEGATIVE — SIGNIFICANT CHANGE UP
VIT B12 SERPL-MCNC: 932 PG/ML — SIGNIFICANT CHANGE UP (ref 232–1245)
WBC UR QL: 6 /HPF — HIGH (ref 0–5)

## 2022-06-29 PROCEDURE — 99223 1ST HOSP IP/OBS HIGH 75: CPT

## 2022-06-29 PROCEDURE — 93306 TTE W/DOPPLER COMPLETE: CPT | Mod: 26

## 2022-06-29 PROCEDURE — G0452: CPT | Mod: 26

## 2022-06-29 PROCEDURE — 70553 MRI BRAIN STEM W/O & W/DYE: CPT | Mod: 26

## 2022-06-29 PROCEDURE — 70544 MR ANGIOGRAPHY HEAD W/O DYE: CPT | Mod: 26,59

## 2022-06-29 PROCEDURE — 70549 MR ANGIOGRAPH NECK W/O&W/DYE: CPT | Mod: 26

## 2022-06-29 PROCEDURE — 93970 EXTREMITY STUDY: CPT | Mod: 26

## 2022-06-29 RX ORDER — RISPERIDONE 4 MG/1
1 TABLET ORAL
Qty: 30 | Refills: 0
Start: 2022-06-29 | End: 2022-07-28

## 2022-06-29 RX ORDER — PALIPERIDONE 1.5 MG/1
156 TABLET, EXTENDED RELEASE ORAL
Qty: 0 | Refills: 0 | DISCHARGE

## 2022-06-29 RX ORDER — RISPERIDONE 4 MG/1
4 TABLET ORAL AT BEDTIME
Refills: 0 | Status: DISCONTINUED | OUTPATIENT
Start: 2022-06-29 | End: 2022-06-30

## 2022-06-29 RX ORDER — PALIPERIDONE 1.5 MG/1
234 TABLET, EXTENDED RELEASE ORAL
Qty: 0 | Refills: 0 | DISCHARGE

## 2022-06-29 RX ORDER — RISPERIDONE 4 MG/1
2 TABLET ORAL
Refills: 0 | Status: DISCONTINUED | OUTPATIENT
Start: 2022-06-29 | End: 2022-06-30

## 2022-06-29 RX ADMIN — Medication 1 MILLIGRAM(S): at 21:59

## 2022-06-29 RX ADMIN — DIVALPROEX SODIUM 1000 MILLIGRAM(S): 500 TABLET, DELAYED RELEASE ORAL at 21:58

## 2022-06-29 RX ADMIN — Medication 1 MILLIGRAM(S): at 22:00

## 2022-06-29 RX ADMIN — ENOXAPARIN SODIUM 40 MILLIGRAM(S): 100 INJECTION SUBCUTANEOUS at 21:58

## 2022-06-29 RX ADMIN — RISPERIDONE 4 MILLIGRAM(S): 4 TABLET ORAL at 22:00

## 2022-06-29 RX ADMIN — Medication 81 MILLIGRAM(S): at 11:52

## 2022-06-29 RX ADMIN — Medication 5 MILLIGRAM(S): at 21:58

## 2022-06-29 RX ADMIN — ATORVASTATIN CALCIUM 80 MILLIGRAM(S): 80 TABLET, FILM COATED ORAL at 21:58

## 2022-06-29 RX ADMIN — Medication 1 TABLET(S): at 11:56

## 2022-06-29 RX ADMIN — Medication 2 MILLIGRAM(S): at 12:53

## 2022-06-29 RX ADMIN — CLOPIDOGREL BISULFATE 75 MILLIGRAM(S): 75 TABLET, FILM COATED ORAL at 11:52

## 2022-06-29 NOTE — BH CONSULTATION LIAISON ASSESSMENT NOTE - CURRENT MEDICATION
MEDICATIONS  (STANDING):  aspirin  chewable 81 milliGRAM(s) Oral daily  atorvastatin 80 milliGRAM(s) Oral at bedtime  benztropine 1 milliGRAM(s) Oral at bedtime  clopidogrel Tablet 75 milliGRAM(s) Oral daily  diVALproex ER 1000 milliGRAM(s) Oral at bedtime  enoxaparin Injectable 40 milliGRAM(s) SubCutaneous every 24 hours  LORazepam   Injectable 2 milliGRAM(s) IV Push once  melatonin 5 milliGRAM(s) Oral at bedtime  multivitamin 1 Tablet(s) Oral daily    MEDICATIONS  (PRN):  acetaminophen     Tablet .. 650 milliGRAM(s) Oral every 6 hours PRN Temp greater or equal to 38C (100.4F), Mild Pain (1 - 3), Moderate Pain (4 - 6)

## 2022-06-29 NOTE — DISCHARGE NOTE PROVIDER - HOSPITAL COURSE
52 yo RH female with a PMHx of anemia, HTN, breast cancer (invasive ductal carcinoma; 2020 s/p R breast lumpectomy but patient declined further aftercare/treatment), and h/o nontoxic goiter (s/p thyroidectomy) and with a PPHx of bipolar I disorder who presents to [facility] ED as a transfer from [facility] due to concern for possible acute infarct seen on MRI Head. Patient was initially brought to [facility] ED on 06/06 after a bystander called 911 as the patient was undressing herself in public. Patient was then transferred to [facility] due to concern for manic episode. Patient was noted to have waxing/waning AMS and was evaluated by medicine team at [facility] due to concern for delirium. During her hospitalization at [facility], patient was treated for a UTI with 5-day course of Bactrim. Patient was also evaluated by neurology at [facility] due to concern for possible superimposed encephalopathy. Given concern for possible CNS metastatic breast cancer, MRI Head w/wo was ordered. Patient first attempted MRI on 06/16, but study was significantly motion limited/incomplete with concern for possible subacute infarcts vs metastasis in the L occipital lobe. MRI was attempted a second time on 06/27. This was again a very motion-limited study, but showed areas of abnormal signal involving DWI sequence concerning for acute infarcts vs underlying metastatic lesions. Patient was transferred to [facility] for admission to stroke service and further workup of possible acute infarcts. Patient does not know why she was at [facility], but was able to state she has been sent to [facility] because "they found something in my brain." She notes she has been having a lot of falls over the past ~3 weeks, most recently about 1 week ago. She is not sure if the falls happened because of a gait disturbance. At baseline, patient ambulates without assistance/assistive device and is independent with all ADLs. 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 Patient denies HA, dizziness, vision changes, speech changes, numbness/tingling, weakness.    NIHSS: 0  MRS: 0 52 yo RH female with a PMHx of anemia, HTN, breast cancer (invasive ductal carcinoma; 2020 s/p R breast lumpectomy but patient declined further aftercare/treatment), and h/o nontoxic goiter (s/p thyroidectomy) and with a PPHx of bipolar I disorder who presents to [facility] ED as a transfer from [facility] due to concern for possible acute infarct seen on MRI Head. Patient was initially brought to [facility] ED on 06/06 after a bystander called 911 as the patient was undressing herself in public. Patient was then transferred to [facility] due to concern for manic episode. Patient was noted to have waxing/waning AMS and was evaluated by medicine team at [facility] due to concern for delirium. During her hospitalization at [facility], patient was treated for a UTI with 5-day course of Bactrim. Patient was also evaluated by neurology at [facility] due to concern for possible superimposed encephalopathy. Given concern for possible CNS metastatic breast cancer, MRI Head w/wo was ordered. Patient first attempted MRI on 06/16, but study was significantly motion limited/incomplete with concern for possible subacute infarcts vs metastasis in the L occipital lobe. MRI was attempted a second time on 06/27. This was again a very motion-limited study, but showed areas of abnormal signal involving DWI sequence concerning for acute infarcts vs underlying metastatic lesions. Patient was transferred to [facility] for admission to stroke service and further workup of possible acute infarcts. Patient does not know why she was at [facility], but was able to state she has been sent to [facility] because "they found something in my brain." She notes she has been having a lot of falls over the past ~3 weeks, most recently about 1 week ago. She is not sure if the falls happened because of a gait disturbance. At baseline, patient ambulates without assistance/assistive device and is independent with all ADLs. 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 Patient denies HA, dizziness, vision changes, speech changes, numbness/tingling, weakness.    NIHSS: 0  MRS: 0    MRI HEAD w/ contrast 06/29/22: No acute infarcts identified on diffusion-weighted imaging. Prior   scattered infarcts are now subacute. No masses or abnormal parenchymal or   leptomeningeal enhancement although the examination is limited by motion   artifact as are the 2 examinations performed previously.  MRA H/N: Mildly motion limited.  Unremarkable MR angiogram of the brain and neck. No aneurysm or vascular   malformation depicted. No stenosis or changes of vasculopathy.    MRI HEAD 06/27/22: Areas of abnormal signal involving the diffusion weighted   sequence is again seen. It is unclear whether these are due to acute   infarcts versus underlying lesions.     MRI HEAD 06/16/22: Indeterminate small hyperintense foci on diffusion-weighted images within   the left occipital lobe.     Impression: Multiple vascular territory punctate infarction bilateral cerebral hemispheres. Possible etiology cardioembolic, ESUS  GUSTABO ordered, patient adamantly refused while down at GUSTABO lab, she rather have it done as outpatient, will need ILR placed as well as outpatient if GUSTABO negative.  Antithrombotic: ASA for stroke prevention  LDL: 143  Psych consulted:  1) at this time, continue depakote 1000 mg qhs for mood stability  2) continue ativan 1 mg po tid for now which can be further titrated off as outpatient  3) continue risperdal 2 mg am and 4 mg qhs for mood stability  4) continue cogentin 1 mg qhs for EPS  Has follow up appt with outpatient Dr. Yanez July 6th at 3 pm.   Hematology consulted: Positive Lupus Screening  --As quantitative levels are low (<40), no indication to treat for thrombophilia with AC. CT C/A/P: No CT evidence of metastatic disease in the chest, abdomen and pelvis.     Evaluated by PT/OT recommends home with assistance. Patient stable for discharge.

## 2022-06-29 NOTE — BH DISCHARGE NOTE NURSING/SOCIAL WORK/PSYCH REHAB - NSCDUDCCRISIS_PSY_A_CORE
[facility] Well  1 (957) [facility]-WELL ([phone])  Text "WELL" to 05572  Website: www.[place]/.[facility] 1 (955) 241-TPYW (7864) Website: www.safehorizon.org/.National Suicide Prevention Lifeline 9 (696) 005-4345/.  [facility]  1 (812) LIFENET (416-7782)/.  [facility]  [address] [place] [address] [phone]   Hours:  Monday through Friday from 9 AM to 3 PM/.  [facility] - [facility] Crisis Line  [phone], Option 1

## 2022-06-29 NOTE — DISCHARGE NOTE PROVIDER - NSDCMRMEDTOKEN_GEN_ALL_CORE_FT
acetaminophen 325 mg oral tablet: 2 tab(s) orally every 6 hours, As needed, Severe Pain (7 - 10)  benztropine 1 mg oral tablet: 1 tab(s) orally once a day (at bedtime)  divalproex sodium 500 mg oral tablet, extended release: 2 tab(s) orally once a day (at bedtime)  Invega Sustenna 156 mg/mL intramuscular suspension, extended release: 156 milligram(s) intramuscular once at least 4 days after 234mg dose  Invega Sustenna 234 mg/1.5 mL intramuscular suspension, extended release: 234 milligram(s) intramuscular once on 6/29  Melatonin 5 mg oral tablet: 1 tab(s) orally once a day (at bedtime)  Multiple Vitamins oral tablet: 1 tab(s) orally once a day   aspirin 81 mg oral tablet, chewable: 1 tab(s) orally once a day  atorvastatin 80 mg oral tablet: 1 tab(s) orally once a day (at bedtime)  benztropine 1 mg oral tablet: 1 tab(s) orally once a day (at bedtime)  divalproex sodium 500 mg oral tablet, extended release: 2 tab(s) orally once a day (at bedtime)  LORazepam 1 mg oral tablet: 1 tab(s) orally every 8 hours MDD:3 mg  Melatonin 5 mg oral tablet: 1 tab(s) orally once a day (at bedtime)  Multiple Vitamins oral tablet: 1 tab(s) orally once a day  risperiDONE 2 mg oral tablet: 1 tab(s) orally once a day (in the morning)   risperiDONE 4 mg oral tablet: 1 tab(s) orally once a day (at bedtime)

## 2022-06-29 NOTE — OCCUPATIONAL THERAPY INITIAL EVALUATION ADULT - PRECAUTIONS/LIMITATIONS, REHAB EVAL
Patient was initially brought to [facility] ED on 06/06 after a bystander called 911 as the patient was undressing herself in public. Patient was then transferred to [facility] due to concern for manic episode. Patient was noted to have waxing/waning AMS and was evaluated by medicine team at [facility] due to concern for delirium. During her hospitalization at [facility], patient was treated for a UTI with 5-day course of Bactrim. Patient was also evaluated by neurology at [facility] due to concern for possible superimposed encephalopathy. Given concern for possible CNS metastatic breast cancer, MRI Head w/wo was ordered. Patient first attempted MRI on 06/16, but study was significantly motion limited/incomplete with concern for possible subacute infarcts vs metastasis in the L occipital lobe. MRI was attempted a second time on 06/27. This was again a very motion-limited study, but showed areas of abnormal signal involving DWI sequence concerning for acute infarcts vs underlying metastatic lesions. Patient was transferred to [facility] for admission to stroke service and further workup of possible acute infarcts. Patient does not know why she was at [facility], but was able to state she has been sent to [facility] because "they found something in my brain." She notes she has been having a lot of falls over the past ~3 weeks, most recently about 1 week ago. She is not sure if the falls happened because of a gait disturbance. At baseline, patient ambulates without assistance/assistive device and is independent with all ADLs. 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 Patient denies HA, dizziness, vision changes, speech changes, numbness/tingling, weakness./fall precautions

## 2022-06-29 NOTE — CHART NOTE - NSCHARTNOTEFT_GEN_A_CORE
Obtain screening lower extremity venous ultrasound in patients who meet 1 or more of the following criteria as patient is high risk for DVT/PE on admission:   [] History of DVT/PE  [x]Hypercoagulable states (Factor V Leiden, Cancer, OCP, etc. )  []Prolonged immobility (hemiplegia/hemiparesis/post operative or any other extended immobilization)  [] Transferred from outside facility (Rehab or Long term care)  [x] Age </= to 50.

## 2022-06-29 NOTE — ED ADULT NURSE REASSESSMENT NOTE - NS ED NURSE REASSESS COMMENT FT1
pt. is aware of current plan, to be admitted to neuro unit. 1:1 CO for psychosis maintained. denies any physical complaints and s/hi.

## 2022-06-29 NOTE — BH DISCHARGE NOTE NURSING/SOCIAL WORK/PSYCH REHAB - PATIENT PORTAL LINK FT
You can access the FollowMyHealth Patient Portal offered by [facility] by registering at the following website: [email or web address]. By joining [facility]’s FollowMyHealth portal, you will also be able to view your health information using other applications (apps) compatible with our system.

## 2022-06-29 NOTE — CONSULT NOTE ADULT - SUBJECTIVE AND OBJECTIVE BOX
Reason for consult: Positive Lupus Screening, History of Breast Cancer    HPI:  50 yo RH female with a PMHx of anemia, HTN, breast cancer (invasive ductal carcinoma; 2020 s/p R breast lumpectomy but patient declined further aftercare/treatment), and h/o nontoxic goiter (s/p thyroidectomy) and with a PPHx of bipolar I disorder who presents to [facility] ED as a transfer from [facility] due to concern for possible acute infarct seen on MRI Head. Patient was initially brought to [facility] ED on 06/06 after a bystander called 911 as the patient was undressing herself in public. Patient was then transferred to [facility] due to concern for manic episode. Patient was noted to have waxing/waning AMS and was evaluated by medicine team at [facility] due to concern for delirium. During her hospitalization at [facility], patient was treated for a UTI with 5-day course of Bactrim. Patient was also evaluated by neurology at [facility] due to concern for possible superimposed encephalopathy. Given concern for possible CNS metastatic breast cancer, MRI Head w/wo was ordered. Patient first attempted MRI on 06/16, but study was significantly motion limited/incomplete with concern for possible subacute infarcts vs metastasis in the L occipital lobe. MRI was attempted a second time on 06/27. This was again a very motion-limited study, but showed areas of abnormal signal involving DWI sequence concerning for acute infarcts vs underlying metastatic lesions. Patient was transferred to [facility] for admission to stroke service and further workup of possible acute infarcts. Patient does not know why she was at [facility], but was able to state she has been sent to [facility] because "they found something in my brain." She notes she has been having a lot of falls over the past ~3 weeks, most recently about 1 week ago. She is not sure if the falls happened because of a gait disturbance. At baseline, patient ambulates without assistance/assistive device and is independent with all ADLs. 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 Patient denies HA, dizziness, vision changes, speech changes, numbness/tingling, weakness.    NIHSS: 0  MRS: 0 (28 Jun 2022 22:32)    Hematology/Oncology consulted to evaluate patient for two Hem/Onc concerns.    She has a PMH of breast cancer S/P right lumpectomy and lymph node biopsy in 2020 at [facility] (Dr. Daniel Moran). She was told that she had no positive lymph nodes and she had stage II disease. She was seen by Dr. Jessica Vergara, also at [facility] and was recommended to start hormonal therapy but decided not to pursue. She was also scheduled to have adjuvant RT but opted not to continue after her planning markers were already placed.     After CVA, patient had lupus screening which was positive. Cardiolipin antibodies were positive for IgG was 23.3, IgM 23.0 and IgA 5.4 (all <40 which is insignificant). Beta-2 Glycoprotein was also positive but with normal levels on quantification (also not significant). No prior to family history of clots.      PAST MEDICAL & SURGICAL HISTORY:  Bipolar disorder      Anemia      HTN (hypertension)      Nontoxic goiter  s/p thyroidectomy      Invasive ductal carcinoma of breast  s/p R breast lumpectomy      S/P lumpectomy, right breast      H/O thyroidectomy          FAMILY HISTORY:  Family history of breast cancer (Aunt)  3 maternal aunts        Alcohol Denied  Smoking: Nonsmoker  Drug Use: Denied  Marital Status:         Allergies    No Known Allergies    Intolerances        MEDICATIONS  (STANDING):  aspirin  chewable 81 milliGRAM(s) Oral daily  atorvastatin 80 milliGRAM(s) Oral at bedtime  benztropine 1 milliGRAM(s) Oral at bedtime  clopidogrel Tablet 75 milliGRAM(s) Oral daily  diVALproex ER 1000 milliGRAM(s) Oral at bedtime  enoxaparin Injectable 40 milliGRAM(s) SubCutaneous every 24 hours  LORazepam     Tablet 1 milliGRAM(s) Oral every 8 hours  melatonin 5 milliGRAM(s) Oral at bedtime  multivitamin 1 Tablet(s) Oral daily  risperiDONE   Tablet 2 milliGRAM(s) Oral <User Schedule>  risperiDONE   Tablet 4 milliGRAM(s) Oral at bedtime    MEDICATIONS  (PRN):  acetaminophen     Tablet .. 650 milliGRAM(s) Oral every 6 hours PRN Temp greater or equal to 38C (100.4F), Mild Pain (1 - 3), Moderate Pain (4 - 6)      ROS  No fever, sweats, chills  No epistaxis, HA, sore throat  No CP, SOB, cough, sputum  History of surgery for goiter  No n/v/d, abd pain, melena, hematochezia  History of breast surgery. No current symptoms  No edema  No rash  Patient with known bipolar disorder  No back pain, joint pain  No bleeding, bruising  No dysuria, hematuria    T(C): 36.9 (06-29-22 @ 12:50), Max: 36.9 (06-29-22 @ 00:05)  HR: 77 (06-29-22 @ 12:50) (75 - 93)  BP: 103/70 (06-29-22 @ 12:50) (101/65 - 118/74)  RR: 20 (06-29-22 @ 12:50) (12 - 20)  SpO2: 99% (06-29-22 @ 12:50) (96% - 99%)  Wt(kg): --    PE  NAD  Awake, alert  Anicteric, MMM  Breasts - S/P Right lumpectomy. No masses, nipple or skin changes bilaterally  RRR  CTAB  Abd soft, NT, ND  No c/c/e  No rash grossly                            9.9    9.22  )-----------( 315      ( 28 Jun 2022 22:04 )             30.9       06-28    138  |  97  |  26<H>  ----------------------------<  110<H>  4.4   |  28  |  0.68    Ca    9.2      28 Jun 2022 22:04  Phos  5.0     06-28  Mg     2.0     06-28    TPro  6.4  /  Alb  3.8  /  TBili  <0.1<L>  /  DBili  x   /  AST  21  /  ALT  29  /  AlkPhos  58  06-28      ACC: [id number] EXAM:  CT CHEST IC                        ACC: [id number] EXAM:  CT ABDOMEN AND PELVIS IC                          PROCEDURE DATE:  06/28/2022          INTERPRETATION:  CLINICAL INFORMATION: Acute stroke with concern for   possible underlying metastatic lesions. History of breast cancer.   Evaluate for malignancy.    COMPARISON: None.    CONTRAST/COMPLICATIONS:  IV Contrast: Omnipaque 350   90 cc administered   0 cc discarded  Oral Contrast: NONE  Complications: None reported at time of study completion    PROCEDURE:  CT of the Chest, Abdomen and Pelvis was performed.  Sagittal and coronal reformats were performed.    FINDINGS:  CHEST:  LUNGS AND LARGE AIRWAYS: Patent central airways. Subsegmental and linear   atelectasis. Focus of lenticular thickening in the lingula associated   with linear fibrosis/atelectasis.  PLEURA: Trace bilateral pleural effusions.  VESSELS: Within normal limits.  HEART: Heart size is normal. No pericardial effusion.  MEDIASTINUM AND INA: No lymphadenopathy.  CHEST WALL AND LOWER NECK: Within normal limits.    ABDOMEN AND PELVIS:  LIVER: Within normal limits.  BILE DUCTS: Normal caliber.  GALLBLADDER: Contracted.  SPLEEN: Within normal limits.  PANCREAS: Within normal limits.  ADRENALS: Within normal limits.  KIDNEYS/URETERS: Subcentimeter hypodense focus too small to characterize   in the left kidney.    BLADDER: Distended urinary bladder.  REPRODUCTIVE ORGANS: 3.1 x 2.2 cm right adnexal cyst.    BOWEL: No bowel obstruction. Appendix is normal.  PERITONEUM: No ascites.  VESSELS: Within normal limits.  RETROPERITONEUM/LYMPH NODES: No lymphadenopathy. There is a 6 mm soft   tissue lesion anterior to the left iliopsoas (2:102.  ABDOMINAL WALL: Small fat-containing umbilical hernia.  BONES: Within normal limits.    IMPRESSION:  No CT evidence of metastatic disease in the chest, abdomen and pelvis.    Right adnexal cyst. May obtain further evaluation with pelvic sonogram.        --- End of Report ---      ACC: [id number] EXAM:  DUPLEX SCAN EXT VEINS LOWER BI                          PROCEDURE DATE:  06/29/2022          INTERPRETATION:  CLINICAL INFORMATION: History of CVA    COMPARISON: None available.    TECHNIQUE: Duplex sonography of the BILATERAL LOWER extremity veins with   color and spectral Doppler, with and without compression.    FINDINGS:    RIGHT:  Normal compressibility of the RIGHT common femoral, femoral and popliteal   veins.  Doppler examination shows normal spontaneous and phasic flow.  No RIGHT calf vein thrombosis is detected.    LEFT:  Normal compressibility of the LEFT common femoral, femoral and popliteal   veins.  Doppler examination shows normal spontaneous and phasic flow.  No LEFT calf vein thrombosis is detected.    IMPRESSION:  No evidence of deep venous thrombosis in either lower extremity.          --- End of Report ---        Patient name: JEFFREY YU  YOB: 1970   Age: 51 (F)   MR#: [id number]  Study Date: 6/29/2022  Location: O/PSonographer: Shannon Ingram RDCS  Study quality: Technically good  Referring Physician: Dallas Tomlinson MD  Blood Pressure: 118/74 mmHg  Height: 157 cm  Weight: 64 kg  BSA: 1.7 m2  ------------------------------------------------------------------------  PROCEDURE: Transthoracic echocardiogram with 2-D, M-Mode  and complete spectral and color flow Doppler.  INDICATION: Cerebral infarction, unspecified (I63.9)  ------------------------------------------------------------------------  Dimensions:    Normal Values:  LA:     2.7    2.0 - 4.0 cm  Ao:     3.5    2.0 - 3.8 cm  SEPTUM: 0.8    0.6 - 1.2 cm  PWT:    0.8    0.6 - 1.1 cm  LVIDd:  4.3    3.0 - 5.6 cm  LVIDs:  2.7    1.8 - 4.0 cm  Derived variables:  LVMI: 64 g/m2  RWT: 0.37  Fractional short: 37 %  EF (So Rule): 65 %Doppler Peak Velocity (m/sec):  AoV=1.2  ------------------------------------------------------------------------  Observations:  Mitral Valve: Normal mitral valve. Minimal mitral  regurgitation.  Aortic Valve/Aorta: Normal trileaflet aortic valve. Peak  transaortic valve gradient equals 6 mm Hg. Peak left  ventricular outflow tract gradient equals 4 mm Hg.  Aortic Root: 3.5 cm.  Ascending Aorta: 3 cm.  LVOT diameter: 2 cm.  Left Atrium: Normal left atrium.  LA volume index = 19  cc/m2.  Left Ventricle: Normal left ventricular systolic function.  No segmental wall motion abnormalities. Normal left  ventricular internal dimensions and wall thicknesses.  Normal diastolic function  Right Heart: Normal right atrium. Normal right ventricular  size and function. Normal tricuspid valve. Normal pulmonic  valve.  Pericardium/Pleura: Normal pericardium with no pericardial  effusion.  Hemodynamic: Estimated right atrial pressure is 8 mm Hg.  ------------------------------------------------------------------------  Conclusions:  1. Normal left ventricular internal dimensions and wall  thicknesses.  2. Normal left ventricular systolic function. No segmental  wall motion abnormalities.  3. Normal diastolic function  4. Normal right ventricular size and function.  *** No previous Echo exam.  ------------------------------------------------------------------------  Confirmed on  6/29/2022 - 09:23:00 by Donny Reeves M.D.  [facility]      ACC: [id number] EXAM:  MR BRAIN [facility]                          PROCEDURE DATE:  06/27/2022          INTERPRETATION:  Clinical indications: Metastasis.    MRI of the brain was performed using sagittal T1 axial T1 T2 T2 FLAIR   diffusion and susceptibility weighted sequence. The patient was injected   with approximately 6.5 cc of gadavist IV with 1 cc of contrast discarded.   Sagittal coronal and axial T1-weighted sequence performed.    This exam is compared with prior limited brain MRI performed on June 16, 2022.    Please note this study is extremely limited (especially the postcontrast   sequences) by motion.    The lateral ventricles have normal configuration    Abnormal areas of increased signal involving the left splenium of the   corpus callosum as well as the left occipital subcortical region. There   is also evidence of a area of abnormality seen involving the left   posterior corona radiata and right parietal subcortical region. These   findings could be compatible with acute infarcts, though better contrast   enhanced MRI brain is recommended to ensure that there is no abnormal   enhancing component to suggest underlying metastasis given patient's   history. No significant shift or herniation is seen.    The large vessels appear normal.    Mild inflammatory changes involving the right mastoid region.    IMPRESSION: Areas of abnormal signal involving the diffusion weighted   sequence is again seen. It is unclear whether these are due to acute   infarcts versus underlying lesions. Better quality contrast enhanced MRI   of the brain is recommended when patient can tolerate it or sedation be   given.    --- End of Report ---

## 2022-06-29 NOTE — OCCUPATIONAL THERAPY INITIAL EVALUATION ADULT - PERTINENT HX OF CURRENT PROBLEM, REHAB EVAL
52 yo RH female with a PMHx of anemia, HTN, breast cancer (invasive ductal carcinoma; 2020 s/p R breast lumpectomy but patient declined further aftercare/treatment), and h/o nontoxic goiter (s/p thyroidectomy) and with a PPHx of bipolar I disorder who presents to [facility] ED as a transfer from [facility] due to concern for possible acute infarct seen on MRI Head.

## 2022-06-29 NOTE — PHYSICAL THERAPY INITIAL EVALUATION ADULT - ACTIVE RANGE OF MOTION EXAMINATION, REHAB EVAL
maya. upper extremity Active ROM was WNL (within normal limits)/bilateral lower extremity Active ROM was WNL (within normal limits)

## 2022-06-29 NOTE — DISCHARGE NOTE PROVIDER - PROVIDER TOKENS
PROVIDER:[TOKEN:[[id number]],FOLLOWUP:[1 week]],PROVIDER:[TOKEN:[[id number]],FOLLOWUP:[1 week]],PROVIDER:[TOKEN:[[id number]],SCHEDULEDAPPT:[07/06/2022],SCHEDULEDAPPTTIME:[03:00 PM]]

## 2022-06-29 NOTE — BH CONSULTATION LIAISON ASSESSMENT NOTE - NSBHCHARTREVIEWVS_PSY_A_CORE FT
Vital Signs Last 24 Hrs  T(C): 36.8 (29 Jun 2022 05:00), Max: 36.9 (29 Jun 2022 00:05)  T(F): 98.3 (29 Jun 2022 05:00), Max: 98.4 (29 Jun 2022 00:05)  HR: 75 (29 Jun 2022 05:00) (75 - 90)  BP: 118/74 (29 Jun 2022 05:00) (101/65 - 118/74)  BP(mean): --  RR: 18 (29 Jun 2022 05:00) (12 - 18)  SpO2: 98% (29 Jun 2022 05:00) (97% - 99%)

## 2022-06-29 NOTE — PHYSICAL THERAPY INITIAL EVALUATION ADULT - PERTINENT HX OF CURRENT PROBLEM, REHAB EVAL
52 yo RH female with PMHx of anemia, HTN, breast cancer (invasive ductal carcinoma; 2020 s/p R breast lumpectomy but patient declined further aftercare/treatment), and h/o nontoxic goiter (s/p thyroidectomy) and with PPHx of bipolar I disorder; presents to [facility] ED as a transfer from [facility] due to concern for possible acute infarct seen on MRI Head.

## 2022-06-29 NOTE — OCCUPATIONAL THERAPY INITIAL EVALUATION ADULT - ADDITIONAL COMMENTS
CT abdomen/pelvis/chest 6/28-No CT evidence of metastatic disease in the chest, abdomen and pelvis. Right adnexal cyst. May obtain further evaluation with pelvic sonogram.  MR head 6/27-Areas of abnormal signal involving the diffusion weighted sequence is again seen. It is unclear whether these are due to acute infarcts versus underlying lesions. Better quality contrast enhanced MRI of the brain is recommended when patient can tolerate it or sedation be given.

## 2022-06-29 NOTE — BH SAFETY PLAN - WARNING SIGN 1
Pt was transferred to [facility] ED, therefore, pt was unable to participate in her safety plan.

## 2022-06-29 NOTE — CONSULT NOTE ADULT - ASSESSMENT
52 yo RH female with a PMHx of anemia, HTN, breast cancer (invasive ductal carcinoma; 2020 s/p R breast lumpectomy but patient declined further aftercare/treatment), and h/o nontoxic goiter (s/p thyroidectomy) and with a PPHx of bipolar I disorder who presents to [facility] ED as a transfer from [facility] due to concern for possible acute infarct seen on MRI Head. Patient was initially brought to [facility] ED on 06/06 after a bystander called 911 as the patient was undressing herself in public. Patient was then transferred to [facility] due to concern for manic episode. Patient was noted to have waxing/waning AMS and was evaluated by medicine team at [facility] due to concern for delirium. During her hospitalization at [facility], patient was treated for a UTI with 5-day course of Bactrim. Patient was also evaluated by neurology at [facility] due to concern for possible superimposed encephalopathy. Given concern for possible CNS metastatic breast cancer, MRI Head w/wo was ordered. Patient first attempted MRI on 06/16, but study was significantly motion limited/incomplete with concern for possible subacute infarcts vs metastasis in the L occipital lobe. MRI was attempted a second time on 06/27. This was again a very motion-limited study, but showed areas of abnormal signal involving DWI sequence concerning for acute infarcts vs underlying metastatic lesions. Patient was transferred to [facility] for admission to stroke service and further workup of possible acute infarcts. Patient does not know why she was at [facility], but was able to state she has been sent to [facility] because "they found something in my brain." She notes she has been having a lot of falls over the past ~3 weeks, most recently about 1 week ago. She is not sure if the falls happened because of a gait disturbance. At baseline, patient ambulates without assistance/assistive device and is independent with all ADLs. 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 Patient denies HA, dizziness, vision changes, speech changes, numbness/tingling, weakness.    NIHSS: 0  MRS: 0 (28 Jun 2022 22:32)    Hematology/Oncology consulted to evaluate patient for two Hem/Onc concerns.    She has a PMH of breast cancer S/P right lumpectomy and lymph node biopsy in 2020 at [facility] (Dr. Daniel Moran). She was told that she had no positive lymph nodes and she had stage II disease. She was seen by Dr. Jessica Vergara, also at [facility] and was recommended to start hormonal therapy but decided not to pursue. She was also scheduled to have adjuvant RT but opted not to continue after her planning markers were already placed.     After CVA, patient had lupus screening which was positive. Cardiolipin antibodies were positive for IgG was 23.3, IgM 23.0 and IgA 5.4 (all <40 which is insignificant). Beta-2 Glycoprotein was also positive but with normal levels on quantification (also not significant). No prior to family history of clots.    History of Breast Cancer  --S/P lumpectomy 2020  --No further treatment per patient's wishes  --Adjuvant treatment window has passed  --CT C/A/P shows no evidence of metastatic disease  --Checking tumor markers, CEA, Ca 27,29    Abnormal MRI head (without contrast)  --Findings consistent with infarcts but cannot rule out underlying metastatic lesions  --Pending MRI head with contrast  --If consistent with mets, would need to biopsy prominent lesion    Positive Lupus Screening  --As quantitative levels are low (<40), no indication to treat for thrombophilia    Anemia  --Normochromic normocytic  --B12, folate were normal  --Checking iron, hapto, LDH, Hemoglobin Electrophoresis  --Also checking myeloma studies    After discharge patient may follow with Dr. Rasta Shah of [facility]    Thank you for the opportunity to participate in Ms. Ulrich's care.    Roc Conteh PA-C  Hematology/Oncology  [facility]   [phone] (office)  [phone] (alt office)  Evenings and weekends please call MD on call or office
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
## 2022-06-29 NOTE — BH CONSULTATION LIAISON ASSESSMENT NOTE - NSBHCONSULTRECOMMENDOTHER_PSY_A_CORE FT
1) at this time, continue depakote 1000 mg qhs for mood stability  2) continue ativan 1 mg po tid for now which can be further titrated off as outpatient  3) continue risperdal 2 mg am and 3 mg qhs for mood stability  4) Undersigner spoke with pt's inpt provider at [facility], Lidya (NP) who indicated pt was being discharged over the next 24-48 hours with plan to give loading dose of Invega which can also be continued/initiated by her outpt provider Dr Yanez. Lidya indicates pt is at baseline mildy hypo-manic, and has improved during her inpt stay. Lidya provided number for Shira ([phone]) who was going to provide after care information, which can help assist  at [place] to help with this aftercare. Pt's mother Nubia ([phone]) was also called to inform her of this, and she is in accord with above plan. Message also left with Dr Yanez regarding above plan.  5) no current indication to send patient back to [facility], pt much improved, can f/u with Dr Yanez for ongoing medication management and outpt care 1) at this time, continue depakote 1000 mg qhs for mood stability  2) continue ativan 1 mg po tid for now which can be further titrated off as outpatient  3) continue risperdal 2 mg am and 4 mg qhs for mood stability  4) Undersigner spoke with pt's inpt provider at [facility], Lidya (NP) who indicated pt was being discharged over the next 24-48 hours with plan to give loading dose of Invega which can also be continued/initiated by her outpt provider Dr Yanez. Lidya indicates pt is at baseline mildy hypo-manic, and has improved during her inpt stay. Lidya provided number for Shira ([phone]) who was going to provide after care information, which can help assist  at [place] to help with this aftercare. Pt's mother Nubia ([phone]) was also called to inform her of this, and she is in accord with above plan. Message also left with Dr Yanez regarding above plan.  5) no current indication to send patient back to [facility], pt much improved, can f/u with Dr Yanez for ongoing medication management and outpt care 1) at this time, continue depakote 1000 mg qhs for mood stability  2) continue ativan 1 mg po tid for now which can be further titrated off as outpatient  3) continue risperdal 2 mg am and 4 mg qhs for mood stability  4) continue cogentin 1 mg qhs for EPS  5) Undersigner spoke with pt's inpt provider at [facility], Lidya (NP) who indicated pt was being discharged over the next 24-48 hours with plan to give loading dose of Invega which can also be continued/initiated by her outpt provider Dr Yanez. Lidya indicates pt is at baseline mildy hypo-manic, and has improved during her inpt stay. Lidya provided number for Shira ([phone]) who was going to provide after care information, which can help assist  at [place] to help with this aftercare. Pt's mother Nubia ([phone]) was also called to inform her of this, and she is in accord with above plan. Message also left with Dr Yanez regarding above plan.  6) no current indication to send patient back to [facility], pt much improved, can f/u with Dr Yanez for ongoing medication management and outpt care

## 2022-06-29 NOTE — OCCUPATIONAL THERAPY INITIAL EVALUATION ADULT - LIVES WITH, PROFILE
Pt lives alone, however states she stays with her SO. +Apt, 0 steps to enter, 2 steps inside. (I) ADLs and functional transfers without AD/DME. +

## 2022-06-29 NOTE — PHYSICAL THERAPY INITIAL EVALUATION ADULT - LIVES WITH, PROFILE
s per chart,  At baseline, patient ambulates without assistance/assistive device and is independent with all ADLs. 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 Prior to admission pt independent with all functional mobility including ambulation without AD./alone

## 2022-06-29 NOTE — DISCHARGE NOTE PROVIDER - NSDCCPCAREPLAN_GEN_ALL_CORE_FT
PRINCIPAL DISCHARGE DIAGNOSIS  Diagnosis: Stroke  Assessment and Plan of Treatment: Please follow up with neurologist in 1 week. Continue taking medications as prescribed. Monitor your blood pressure. Reduce fat, cholesterol and salt in your diet. Increase intake of fruits and vegetables. Limit alcohol to minimum and do not smoke. You may be at risk for falling, make changes to your home to help you walk easier. Keep up to date on vaccinations.  If you experience any symptoms of facial drooping, slurred speech, arm or leg weakness, severe headache, vision changes or any worsening symptoms, notify provider immediatley and return to ER.  Will need outpatient GUSTABO and ILR placement to rule out A. Fib if GUSTABO negative.

## 2022-06-29 NOTE — BH DISCHARGE NOTE NURSING/SOCIAL WORK/PSYCH REHAB - NSDCPRGOAL_PSY_ALL_CORE
Pt was transferred to [facility] ED last night. Initially, pt was very disorganized, intrusive, psychotic, wandering, and urinate on the floor. Pt was on CO 1:1 for disorganization. Pt made progress towards her discharge. Pt has demonstrated daily compliance with medication. Pt reported mood stabilized as benefits of medication compliance. Pt had one incident of attempt to climb on fence due to disorganization and was able to respond to staff’s redirection. Pt become more cooperatively, less intrusive, less disorganized. During this hospitalization, pt showed less than 10% of group attendance. Pt was unable to tolerate group structure, participated minimally. Pt showed good interaction with others. Pt showed significant improvement on her ADLs. Pt was unable to participate in her safety plan due to transferred to ED for medical issues.

## 2022-06-29 NOTE — OCCUPATIONAL THERAPY INITIAL EVALUATION ADULT - COGNITIVE, VISUAL PERCEPTUAL, OT EVAL
Pt will recall 5/5 unrelated words after 5 min delay within 2 week, without cues. Pt will participate in IADLs/ADLs task for 15 min without cues for redirections within 2 weeks. Pt will be A+O x 4 without cues within 2 weeks.

## 2022-06-29 NOTE — SOCIAL WORK POST DISCHARGE FOLLOW UP NOTE - NSBHSWFOLLOWUP_PSY_ALL_CORE_FT
Pt was admitted medically to [facility]. Case is closed. Pt was admitted medically to [facility]. [facility] provided hand off to  Faina ([phone]) including appointment for psychiatric follow up. This case is closed.

## 2022-06-29 NOTE — DISCHARGE NOTE PROVIDER - CARE PROVIDER_API CALL
Charissa Miller (NP; RN)  NP in Family Health  [address], [address]  [place] [address]  Phone: [phone]  Fax: [phone]  Follow Up Time: 1 week    Rasta Shah)  Internal Medicine  [address], [address]  [place] [address]  Phone: [phone]  Fax: [phone]  Follow Up Time: 1 week    Noemi Yanez)  Psychiatry  [address] [place] [address]  Phone: [phone]  Fax: [phone]  Scheduled Appointment: 07/06/2022 03:00 PM

## 2022-06-29 NOTE — BH CONSULTATION LIAISON ASSESSMENT NOTE - DETAILS
message left for KIM Bose taking care of patient on Low 4 bipolar cj admission to [facility] early June 2022, transferred to [facility] today for medical work up
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
## 2022-06-30 ENCOUNTER — TRANSCRIPTION ENCOUNTER (OUTPATIENT)
Age: 52
End: 2022-06-30

## 2022-06-30 VITALS
OXYGEN SATURATION: 99 % | DIASTOLIC BLOOD PRESSURE: 67 MMHG | HEART RATE: 99 BPM | TEMPERATURE: 98 F | SYSTOLIC BLOOD PRESSURE: 96 MMHG | RESPIRATION RATE: 18 BRPM

## 2022-06-30 LAB
ALBUMIN SERPL ELPH-MCNC: 3.8 G/DL — SIGNIFICANT CHANGE UP (ref 3.3–5)
ALP SERPL-CCNC: 56 U/L — SIGNIFICANT CHANGE UP (ref 40–120)
ALT FLD-CCNC: 25 U/L — SIGNIFICANT CHANGE UP (ref 10–45)
ANION GAP SERPL CALC-SCNC: 9 MMOL/L — SIGNIFICANT CHANGE UP (ref 5–17)
AST SERPL-CCNC: 22 U/L — SIGNIFICANT CHANGE UP (ref 10–40)
BASOPHILS # BLD AUTO: 0.05 K/UL — SIGNIFICANT CHANGE UP (ref 0–0.2)
BASOPHILS NFR BLD AUTO: 0.6 % — SIGNIFICANT CHANGE UP (ref 0–2)
BILIRUB SERPL-MCNC: 0.2 MG/DL — SIGNIFICANT CHANGE UP (ref 0.2–1.2)
BUN SERPL-MCNC: 18 MG/DL — SIGNIFICANT CHANGE UP (ref 7–23)
CALCIUM SERPL-MCNC: 9.2 MG/DL — SIGNIFICANT CHANGE UP (ref 8.4–10.5)
CANCER AG27-29 SERPL-ACNC: 26 U/ML — SIGNIFICANT CHANGE UP (ref 0–38.6)
CEA SERPL-MCNC: 4.1 NG/ML — HIGH (ref 0–3.8)
CHLORIDE SERPL-SCNC: 101 MMOL/L — SIGNIFICANT CHANGE UP (ref 96–108)
CO2 SERPL-SCNC: 28 MMOL/L — SIGNIFICANT CHANGE UP (ref 22–31)
CREAT SERPL-MCNC: 0.72 MG/DL — SIGNIFICANT CHANGE UP (ref 0.5–1.3)
EGFR: 102 ML/MIN/1.73M2 — SIGNIFICANT CHANGE UP
EOSINOPHIL # BLD AUTO: 0.21 K/UL — SIGNIFICANT CHANGE UP (ref 0–0.5)
EOSINOPHIL NFR BLD AUTO: 2.6 % — SIGNIFICANT CHANGE UP (ref 0–6)
FERRITIN SERPL-MCNC: 49 NG/ML — SIGNIFICANT CHANGE UP (ref 15–150)
GLUCOSE SERPL-MCNC: 90 MG/DL — SIGNIFICANT CHANGE UP (ref 70–99)
HAPTOGLOB SERPL-MCNC: 213 MG/DL — HIGH (ref 34–200)
HCT VFR BLD CALC: 31.7 % — LOW (ref 34.5–45)
HGB BLD-MCNC: 10.1 G/DL — LOW (ref 11.5–15.5)
IGA FLD-MCNC: 129 MG/DL — SIGNIFICANT CHANGE UP (ref 84–499)
IGG FLD-MCNC: 832 MG/DL — SIGNIFICANT CHANGE UP (ref 610–1660)
IGM SERPL-MCNC: 166 MG/DL — SIGNIFICANT CHANGE UP (ref 35–242)
IMM GRANULOCYTES NFR BLD AUTO: 1.4 % — SIGNIFICANT CHANGE UP (ref 0–1.5)
IRON SATN MFR SERPL: 13 % — LOW (ref 14–50)
IRON SATN MFR SERPL: 50 UG/DL — SIGNIFICANT CHANGE UP (ref 30–160)
KAPPA LC SER QL IFE: 1.8 MG/DL — SIGNIFICANT CHANGE UP (ref 0.33–1.94)
KAPPA/LAMBDA FREE LIGHT CHAIN RATIO, SERUM: 1.16 RATIO — SIGNIFICANT CHANGE UP (ref 0.26–1.65)
LAMBDA LC SER QL IFE: 1.55 MG/DL — SIGNIFICANT CHANGE UP (ref 0.57–2.63)
LDH SERPL L TO P-CCNC: 158 U/L — SIGNIFICANT CHANGE UP (ref 50–242)
LYMPHOCYTES # BLD AUTO: 2.95 K/UL — SIGNIFICANT CHANGE UP (ref 1–3.3)
LYMPHOCYTES # BLD AUTO: 36.4 % — SIGNIFICANT CHANGE UP (ref 13–44)
MCHC RBC-ENTMCNC: 29.3 PG — SIGNIFICANT CHANGE UP (ref 27–34)
MCHC RBC-ENTMCNC: 31.9 GM/DL — LOW (ref 32–36)
MCV RBC AUTO: 91.9 FL — SIGNIFICANT CHANGE UP (ref 80–100)
MONOCYTES # BLD AUTO: 0.62 K/UL — SIGNIFICANT CHANGE UP (ref 0–0.9)
MONOCYTES NFR BLD AUTO: 7.6 % — SIGNIFICANT CHANGE UP (ref 2–14)
NEUTROPHILS # BLD AUTO: 4.17 K/UL — SIGNIFICANT CHANGE UP (ref 1.8–7.4)
NEUTROPHILS NFR BLD AUTO: 51.4 % — SIGNIFICANT CHANGE UP (ref 43–77)
NRBC # BLD: 0 /100 WBCS — SIGNIFICANT CHANGE UP (ref 0–0)
PLATELET # BLD AUTO: 338 K/UL — SIGNIFICANT CHANGE UP (ref 150–400)
POTASSIUM SERPL-MCNC: 4.3 MMOL/L — SIGNIFICANT CHANGE UP (ref 3.5–5.3)
POTASSIUM SERPL-SCNC: 4.3 MMOL/L — SIGNIFICANT CHANGE UP (ref 3.5–5.3)
PROT S FREE PPP-ACNC: 74 % — SIGNIFICANT CHANGE UP (ref 63–140)
PROT SERPL-MCNC: 6.2 G/DL — SIGNIFICANT CHANGE UP (ref 6–8.3)
PROT SERPL-MCNC: 6.2 G/DL — SIGNIFICANT CHANGE UP (ref 6–8.3)
PROT SERPL-MCNC: 6.3 G/DL — SIGNIFICANT CHANGE UP (ref 6–8.3)
RBC # BLD: 3.45 M/UL — LOW (ref 3.8–5.2)
RBC # FLD: 13.9 % — SIGNIFICANT CHANGE UP (ref 10.3–14.5)
SODIUM SERPL-SCNC: 138 MMOL/L — SIGNIFICANT CHANGE UP (ref 135–145)
TIBC SERPL-MCNC: 388 UG/DL — SIGNIFICANT CHANGE UP (ref 220–430)
TSH SERPL-MCNC: 11 UIU/ML — HIGH (ref 0.27–4.2)
UIBC SERPL-MCNC: 338 UG/DL — SIGNIFICANT CHANGE UP (ref 110–370)
WBC # BLD: 8.11 K/UL — SIGNIFICANT CHANGE UP (ref 3.8–10.5)
WBC # FLD AUTO: 8.11 K/UL — SIGNIFICANT CHANGE UP (ref 3.8–10.5)

## 2022-06-30 PROCEDURE — 82378 CARCINOEMBRYONIC ANTIGEN: CPT

## 2022-06-30 PROCEDURE — 83020 HEMOGLOBIN ELECTROPHORESIS: CPT

## 2022-06-30 PROCEDURE — 85014 HEMATOCRIT: CPT

## 2022-06-30 PROCEDURE — 70544 MR ANGIOGRAPHY HEAD W/O DYE: CPT

## 2022-06-30 PROCEDURE — 86146 BETA-2 GLYCOPROTEIN ANTIBODY: CPT

## 2022-06-30 PROCEDURE — 84165 PROTEIN E-PHORESIS SERUM: CPT

## 2022-06-30 PROCEDURE — 85730 THROMBOPLASTIN TIME PARTIAL: CPT

## 2022-06-30 PROCEDURE — 85300 ANTITHROMBIN III ACTIVITY: CPT

## 2022-06-30 PROCEDURE — 83550 IRON BINDING TEST: CPT

## 2022-06-30 PROCEDURE — 74177 CT ABD & PELVIS W/CONTRAST: CPT | Mod: MA

## 2022-06-30 PROCEDURE — 97161 PT EVAL LOW COMPLEX 20 MIN: CPT

## 2022-06-30 PROCEDURE — 84443 ASSAY THYROID STIM HORMONE: CPT

## 2022-06-30 PROCEDURE — 81241 F5 GENE: CPT

## 2022-06-30 PROCEDURE — 97129 THER IVNTJ 1ST 15 MIN: CPT

## 2022-06-30 PROCEDURE — U0003: CPT

## 2022-06-30 PROCEDURE — 82784 ASSAY IGA/IGD/IGG/IGM EACH: CPT

## 2022-06-30 PROCEDURE — 71260 CT THORAX DX C+: CPT | Mod: MA

## 2022-06-30 PROCEDURE — 81001 URINALYSIS AUTO W/SCOPE: CPT

## 2022-06-30 PROCEDURE — 99233 SBSQ HOSP IP/OBS HIGH 50: CPT

## 2022-06-30 PROCEDURE — 86334 IMMUNOFIX E-PHORESIS SERUM: CPT

## 2022-06-30 PROCEDURE — 99285 EMERGENCY DEPT VISIT HI MDM: CPT

## 2022-06-30 PROCEDURE — 83735 ASSAY OF MAGNESIUM: CPT

## 2022-06-30 PROCEDURE — 82435 ASSAY OF BLOOD CHLORIDE: CPT

## 2022-06-30 PROCEDURE — 85307 ASSAY ACTIVATED PROTEIN C: CPT

## 2022-06-30 PROCEDURE — 93306 TTE W/DOPPLER COMPLETE: CPT

## 2022-06-30 PROCEDURE — A9585: CPT

## 2022-06-30 PROCEDURE — 82803 BLOOD GASES ANY COMBINATION: CPT

## 2022-06-30 PROCEDURE — 82746 ASSAY OF FOLIC ACID SERUM: CPT

## 2022-06-30 PROCEDURE — 70553 MRI BRAIN STEM W/O & W/DYE: CPT | Mod: MA

## 2022-06-30 PROCEDURE — 83090 ASSAY OF HOMOCYSTEINE: CPT

## 2022-06-30 PROCEDURE — 82607 VITAMIN B-12: CPT

## 2022-06-30 PROCEDURE — 81240 F2 GENE: CPT

## 2022-06-30 PROCEDURE — 36415 COLL VENOUS BLD VENIPUNCTURE: CPT

## 2022-06-30 PROCEDURE — U0005: CPT

## 2022-06-30 PROCEDURE — 85610 PROTHROMBIN TIME: CPT

## 2022-06-30 PROCEDURE — 83615 LACTATE (LD) (LDH) ENZYME: CPT

## 2022-06-30 PROCEDURE — 85652 RBC SED RATE AUTOMATED: CPT

## 2022-06-30 PROCEDURE — 86300 IMMUNOASSAY TUMOR CA 15-3: CPT

## 2022-06-30 PROCEDURE — 83010 ASSAY OF HAPTOGLOBIN QUANT: CPT

## 2022-06-30 PROCEDURE — 85303 CLOT INHIBIT PROT C ACTIVITY: CPT

## 2022-06-30 PROCEDURE — 85306 CLOT INHIBIT PROT S FREE: CPT

## 2022-06-30 PROCEDURE — 85025 COMPLETE CBC W/AUTO DIFF WBC: CPT

## 2022-06-30 PROCEDURE — 84155 ASSAY OF PROTEIN SERUM: CPT

## 2022-06-30 PROCEDURE — 85613 RUSSELL VIPER VENOM DILUTED: CPT

## 2022-06-30 PROCEDURE — 83540 ASSAY OF IRON: CPT

## 2022-06-30 PROCEDURE — 85018 HEMOGLOBIN: CPT

## 2022-06-30 PROCEDURE — 86147 CARDIOLIPIN ANTIBODY EA IG: CPT

## 2022-06-30 PROCEDURE — 83605 ASSAY OF LACTIC ACID: CPT

## 2022-06-30 PROCEDURE — 84100 ASSAY OF PHOSPHORUS: CPT

## 2022-06-30 PROCEDURE — 82728 ASSAY OF FERRITIN: CPT

## 2022-06-30 PROCEDURE — 70549 MR ANGIOGRAPH NECK W/O&W/DYE: CPT

## 2022-06-30 PROCEDURE — 82140 ASSAY OF AMMONIA: CPT

## 2022-06-30 PROCEDURE — 82330 ASSAY OF CALCIUM: CPT

## 2022-06-30 PROCEDURE — 84295 ASSAY OF SERUM SODIUM: CPT

## 2022-06-30 PROCEDURE — 96374 THER/PROPH/DIAG INJ IV PUSH: CPT

## 2022-06-30 PROCEDURE — 84132 ASSAY OF SERUM POTASSIUM: CPT

## 2022-06-30 PROCEDURE — 86140 C-REACTIVE PROTEIN: CPT

## 2022-06-30 PROCEDURE — 84702 CHORIONIC GONADOTROPIN TEST: CPT

## 2022-06-30 PROCEDURE — 97166 OT EVAL MOD COMPLEX 45 MIN: CPT

## 2022-06-30 PROCEDURE — 93970 EXTREMITY STUDY: CPT

## 2022-06-30 PROCEDURE — 82947 ASSAY GLUCOSE BLOOD QUANT: CPT

## 2022-06-30 PROCEDURE — 80053 COMPREHEN METABOLIC PANEL: CPT

## 2022-06-30 PROCEDURE — 97130 THER IVNTJ EA ADDL 15 MIN: CPT

## 2022-06-30 RX ORDER — DIVALPROEX SODIUM 500 MG/1
2 TABLET, DELAYED RELEASE ORAL
Qty: 60 | Refills: 0
Start: 2022-06-30 | End: 2022-07-29

## 2022-06-30 RX ORDER — BENZTROPINE MESYLATE 1 MG
1 TABLET ORAL
Qty: 30 | Refills: 0
Start: 2022-06-30 | End: 2022-07-29

## 2022-06-30 RX ORDER — RISPERIDONE 4 MG/1
1 TABLET ORAL
Qty: 30 | Refills: 0
Start: 2022-06-30 | End: 2022-07-29

## 2022-06-30 RX ORDER — ASPIRIN/CALCIUM CARB/MAGNESIUM 324 MG
1 TABLET ORAL
Qty: 90 | Refills: 0
Start: 2022-06-30 | End: 2022-09-27

## 2022-06-30 RX ORDER — ATORVASTATIN CALCIUM 80 MG/1
1 TABLET, FILM COATED ORAL
Qty: 60 | Refills: 0
Start: 2022-06-30 | End: 2022-08-28

## 2022-06-30 RX ADMIN — Medication 1 TABLET(S): at 12:19

## 2022-06-30 RX ADMIN — Medication 81 MILLIGRAM(S): at 12:18

## 2022-06-30 RX ADMIN — Medication 1 MILLIGRAM(S): at 05:04

## 2022-06-30 RX ADMIN — RISPERIDONE 2 MILLIGRAM(S): 4 TABLET ORAL at 12:33

## 2022-06-30 NOTE — PROGRESS NOTE ADULT - ASSESSMENT
52 yo RH female with a PMHx of anemia, HTN, breast cancer (invasive ductal carcinoma; 2020 s/p R breast lumpectomy but patient declined further aftercare/treatment), and h/o nontoxic goiter (s/p thyroidectomy) and with a PPHx of bipolar I disorder who presents to [facility] ED as a transfer from [facility] due to concern for possible acute infarct seen on MRI Head. Patient was initially brought to [facility] ED on 06/06 after a bystander called 911 as the patient was undressing herself in public. Patient was then transferred to [facility] due to concern for manic episode. Patient had 2 MRI Head attempts at [facility], but both were motion limited. 06/27 MRI Head with concern for areas of abnormal signal involving the diffusion weighted sequence. It is unclear whether these are due to acute infarcts versus underlying lesions.    Impression: Multiple vascular territory punctate infarction bilateral cerebral hemispheres.Possible etiology cardioembolic versus hypercoagulable state from malignancy    NEURO: Continue close monitoring for neurologic deterioration, permissive HTN, titrate statin to LDL goal less than 70, MRI Brain w/o, MRA Head w/o and Neck w/contrast. Physical therapy/OT/Speech eval/treatment.     ANTITHROMBOTIC THERAPY: ASA and Plavix    PULMONARY:  protecting airway, saturating well     CARDIOVASCULAR: TTE: EF 65%, normal LA, normal left ventricular systolic function , cardiac monitoring                              SBP goal: normotension    GASTROINTESTINAL:  dysphagia screen- passed, tolerating diet       Diet: Regular    RENAL: BUN/Cr stable, good urine output      Na Goal: Greater than 135     Gonzales: N    HEMATOLOGY: H/H with Normochromic normocytic anemia Platelets 338, LE Doppler: negative for DVT, hematology consulted: Positive Lupus Screening  --As quantitative levels are low (<40), no indication to treat for thrombophilia with AC. CT C/A/P: No CT evidence of metastatic disease in the chest, abdomen and pelvis.      DVT ppx: Heparin s.c [] LMWH [x]     ID: afebrile, no leukocytosis     OTHER: Plan of care discussed with patient at bedside.  Psych consulted and following, recommendations appreciated. Patient scheduled for outpatient visit with her psychiatrist, Dr. Noemi Yanez for 7/6 at 3pm. Patient boyfriend, Kang made aware of plan.     DISPOSITION: Home once stable and workup is complete      CORE MEASURES:        Admission NIHSS: 0     TPA: [] YES [x] NO      LDL/HDL: 143/47     Depression Screen: 0     Statin Therapy: Y     Dysphagia Screen: [x] PASS [] FAIL     Smoking [] YES [x] NO      Afib [] YES [x] NO     Stroke Education [x] YES [] NO    Obtain screening lower extremity venous ultrasound in patients who meet 1 or more of the following criteria as patient is high risk for DVT/PE on admission:   [] History of DVT/PE  []Hypercoagulable states (Factor V Leiden, Cancer, OCP, etc. )  []Prolonged immobility (hemiplegia/hemiparesis/post operative or any other extended immobilization)  [] Transferred from outside facility (Rehab or Long term care)  [x] Age </= to 50. 52 yo RH female with a PMHx of anemia, HTN, breast cancer (invasive ductal carcinoma; 2020 s/p R breast lumpectomy but patient declined further aftercare/treatment), and h/o nontoxic goiter (s/p thyroidectomy) and with a PPHx of bipolar I disorder who presents to [facility] ED as a transfer from [facility] due to concern for possible acute infarct seen on MRI Head. Patient was initially brought to [facility] ED on 06/06 after a bystander called 911 as the patient was undressing herself in public. Patient was then transferred to [facility] due to concern for manic episode. Patient had 2 MRI Head attempts at [facility], but both were motion limited. 06/27 MRI Head with concern for areas of abnormal signal involving the diffusion weighted sequence. It is unclear whether these are due to acute infarcts versus underlying lesions.    Impression: Multiple vascular territory punctate infarction bilateral cerebral hemispheres.Possible etiology cardioembolic versus hypercoagulable state from malignancy    NEURO: Continue close monitoring for neurologic deterioration, normotension, : started on high dose statin,  MRI Brain w/o, MRA Head w/o and Neck w/contrast noted above. Physical therapy/OT recommends home.    ANTITHROMBOTIC THERAPY: ASA     PULMONARY:  protecting airway, saturating well     CARDIOVASCULAR: TTE: EF 65%, normal LA, normal left ventricular systolic function , cardiac monitoring, [facility] ordered-patient adamantly refused to have test while at [facility] lab, states she will have it as outpatient. If [facility] negative will need ILR to rule out A. Fib as possible source                              SBP goal: normotension    GASTROINTESTINAL:  dysphagia screen- passed, tolerating diet       Diet: Regular    RENAL: BUN/Cr stable, good urine output      Na Goal: Greater than 135     Gonzales: N    HEMATOLOGY: H/H with Normochromic normocytic anemia Platelets 338, LE Doppler: negative for DVT, hematology consulted: Positive Lupus Screening  --As quantitative levels are low (<40), no indication to treat for thrombophilia with AC. CT C/A/P: No CT evidence of metastatic disease in the chest, abdomen and pelvis.      DVT ppx: Heparin s.c [] LMWH [x]     ID: afebrile, no leukocytosis     OTHER: Plan of care discussed with patient and boyfriend, Kang.   Psych consulted and following, recommendations appreciated. Patient scheduled for outpatient visit with her psychiatrist, Dr. Noemi Yanez for 7/6 at 3pm. Patient boyfriendKang made aware of plan. No psych contraindication to discharge.    DISPOSITION: Home today with boyfriend      CORE MEASURES:        Admission NIHSS: 0     TPA: [] YES [x] NO      LDL/HDL: 143/47     Depression Screen: 0     Statin Therapy: Y     Dysphagia Screen: [x] PASS [] FAIL     Smoking [] YES [x] NO      Afib [] YES [x] NO     Stroke Education [x] YES [] NO    Obtain screening lower extremity venous ultrasound in patients who meet 1 or more of the following criteria as patient is high risk for DVT/PE on admission:   [] History of DVT/PE  []Hypercoagulable states (Factor V Leiden, Cancer, OCP, etc. )  []Prolonged immobility (hemiplegia/hemiparesis/post operative or any other extended immobilization)  [] Transferred from outside facility (Rehab or Long term care)  [x] Age </= to 50.

## 2022-06-30 NOTE — DISCHARGE NOTE NURSING/CASE MANAGEMENT/SOCIAL WORK - NSDCPEFALRISK_GEN_ALL_CORE
For information on Fall & Injury Prevention, visit: [email or web address] OR  [email or web address] OR  https://www.cdc.gov/steadi/patient.html

## 2022-06-30 NOTE — PROGRESS NOTE ADULT - REASON FOR ADMISSION
Transfer from [facility], acute stroke Pt received to room 4 a/o x 3 as per EMS s/p fall. pt unable to communicate what happened  or if he had any LOC. Pt noted to have an 2cm laceration to posterior head. Bleed is controlled. pt admits to drinking  "a lot" of ETOH. Respirations even and unlabored. Lung sounds clear with equal chest rise bilaterally. ABD is soft, non tender, non distended with normal active bowel sounds [phone]

## 2022-06-30 NOTE — PROGRESS NOTE ADULT - SUBJECTIVE AND OBJECTIVE BOX
52 yo RH female with a PMHx of anemia, HTN, breast cancer (invasive ductal carcinoma; 2020 s/p R breast lumpectomy but patient declined further aftercare/treatment), and h/o nontoxic goiter (s/p thyroidectomy) and with a PPHx of bipolar I disorder who presents to [facility] ED as a transfer from [facility] due to concern for possible acute infarct seen on MRI Head. Patient was initially brought to [facility] ED on 06/06 after a bystander called 911 as the patient was undressing herself in public. Patient was then transferred to [facility] due to concern for manic episode. Patient was noted to have waxing/waning AMS and was evaluated by medicine team at [facility] due to concern for delirium. During her hospitalization at [facility], patient was treated for a UTI with 5-day course of Bactrim. Patient was also evaluated by neurology at [facility] due to concern for possible superimposed encephalopathy. Given concern for possible CNS metastatic breast cancer, MRI Head w/wo was ordered. Patient first attempted MRI on 06/16, but study was significantly motion limited/incomplete with concern for possible subacute infarcts vs metastasis in the L occipital lobe. MRI was attempted a second time on 06/27. This was again a very motion-limited study, but showed areas of abnormal signal involving DWI sequence concerning for acute infarcts vs underlying metastatic lesions. Patient was transferred to [facility] for admission to stroke service and further workup of possible acute infarcts. Patient does not know why she was at [facility], but was able to state she has been sent to [facility] because "they found something in my brain." She notes she has been having a lot of falls over the past ~3 weeks, most recently about 1 week ago. She is not sure if the falls happened because of a gait disturbance. At baseline, patient ambulates without assistance/assistive device and is independent with all ADLs. 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 Patient denies HA, dizziness, vision changes, speech changes, numbness/tingling, weakness.    NIHSS: 0  MRS: 0      SUBJECTIVE: No events overnight.  No new neurologic complaints.      acetaminophen     Tablet .. 650 milliGRAM(s) Oral every 6 hours PRN  aspirin  chewable 81 milliGRAM(s) Oral daily  atorvastatin 80 milliGRAM(s) Oral at bedtime  benztropine 1 milliGRAM(s) Oral at bedtime  clopidogrel Tablet 75 milliGRAM(s) Oral daily  diVALproex ER 1000 milliGRAM(s) Oral at bedtime  enoxaparin Injectable 40 milliGRAM(s) SubCutaneous every 24 hours  LORazepam     Tablet 1 milliGRAM(s) Oral every 8 hours  melatonin 5 milliGRAM(s) Oral at bedtime  multivitamin 1 Tablet(s) Oral daily  risperiDONE   Tablet 2 milliGRAM(s) Oral <User Schedule>  risperiDONE   Tablet 4 milliGRAM(s) Oral at bedtime      PHYSICAL EXAM:   Vital Signs Last 24 Hrs  T(C): 36.8 (30 Jun 2022 06:23), Max: 37 (29 Jun 2022 21:45)  T(F): 98.3 (30 Jun 2022 06:23), Max: 98.6 (29 Jun 2022 21:45)  HR: 69 (30 Jun 2022 06:23) (69 - 93)  BP: 90/65 (30 Jun 2022 06:23) (90/65 - 117/82)  BP(mean): --  RR: 20 (30 Jun 2022 06:23) (18 - 20)  SpO2: 99% (30 Jun 2022 06:23) (96% - 99%)    General: No acute distress  HEENT: EOM intact, visual fields full  Abdomen: Soft, nontender, nondistended   Extremities: No edema    NEUROLOGICAL EXAM:  Mental status: Awake, alert, oriented x3, no aphasia, no neglect, normal memory   Cranial Nerves: No facial asymmetry, no nystagmus, no dysarthria,  tongue midline  Motor exam: Normal tone, no drift, 5/5 RUE, 5/5 RLE, 5/5 LUE, 5/5 LLE, normal fine finger movements.  Sensation: Intact to light touch   Coordination/ Gait: No dysmetria, JERRY intact and symmetric bilaterally    LABS:                        10.1   8.11  )-----------( 338      ( 30 Jun 2022 05:15 )             31.7    06-30    138  |  101  |  18  ----------------------------<  90  4.3   |  28  |  0.72    Ca    9.2      30 Jun 2022 05:15  Phos  5.0     06-28  Mg     2.0     06-28    TPro  6.3  /  Alb  3.8  /  TBili  0.2  /  DBili  x   /  AST  22  /  ALT  25  /  AlkPhos  56  06-30  PT/INR - ( 28 Jun 2022 22:04 )   PT: 11.6 sec;   INR: 1.01 ratio         PTT - ( 28 Jun 2022 22:04 )  PTT:26.6 sec      IMAGING: Reviewed by me.     MRI HEAD w/ contrast 06/29/22: No acute infarcts identified on diffusion-weighted imaging. Prior   scattered infarcts are now subacute. No masses or abnormal parenchymal or   leptomeningeal enhancement although the examination is limited by motion   artifact as are the 2 examinations performed previously.  MRA H/N: Mildly motion limited.  Unremarkable MR angiogram of the brain and neck. No aneurysm or vascular   malformation depicted. No stenosis or changes of vasculopathy.    MRI HEAD 06/27/22: Areas of abnormal signal involving the diffusion weighted   sequence is again seen. It is unclear whether these are due to acute   infarcts versus underlying lesions.     MRI HEAD 06/16/22: Indeterminate small hyperintense foci on diffusion-weighted images within   the left occipital lobe.

## 2022-06-30 NOTE — DISCHARGE NOTE NURSING/CASE MANAGEMENT/SOCIAL WORK - PATIENT PORTAL LINK FT
You can access the FollowMyHealth Patient Portal offered by [facility] by registering at the following website: [email or web address]. By joining [email or web address] FollowMyHealth portal, you will also be able to view your health information using other applications (apps) compatible with our system.

## 2022-07-01 LAB
APCR PPP: 2.56 RATIO — SIGNIFICANT CHANGE UP
B2 GLYCOPROT1 IGA SER QL: <5 SAU — SIGNIFICANT CHANGE UP
B2 GLYCOPROT1 IGG SER-ACNC: <5 SGU — SIGNIFICANT CHANGE UP
B2 GLYCOPROT1 IGM SER-ACNC: 16.6 SMU — SIGNIFICANT CHANGE UP
CARDIOLIPIN AB SER-ACNC: POSITIVE
DNA PLOIDY SPEC FC-IMP: SIGNIFICANT CHANGE UP
HEMOGLOBIN INTERPRETATION: SIGNIFICANT CHANGE UP
HGB A MFR BLD: 97.3 % — SIGNIFICANT CHANGE UP (ref 95.8–98)
HGB A2 MFR BLD: 2.7 % — SIGNIFICANT CHANGE UP (ref 2–3.2)
PTR INTERPRETATION: SIGNIFICANT CHANGE UP

## 2022-07-02 LAB
CARDIOLIPIN IGM SER-MCNC: 21.8 MPL — HIGH (ref 0–12.5)
CARDIOLIPIN IGM SER-MCNC: 9.8 GPL — SIGNIFICANT CHANGE UP (ref 0–12.5)
DEPRECATED CARDIOLIPIN IGA SER: 5 APL — SIGNIFICANT CHANGE UP (ref 0–12.5)

## 2022-07-07 LAB
% ALBUMIN: 56.7 % — SIGNIFICANT CHANGE UP
% ALPHA 1: 4.3 % — SIGNIFICANT CHANGE UP
% ALPHA 2: 12.2 % — SIGNIFICANT CHANGE UP
% BETA: 12.5 % — SIGNIFICANT CHANGE UP
% GAMMA: 14.3 % — SIGNIFICANT CHANGE UP
ALBUMIN SERPL ELPH-MCNC: 3.5 G/DL — LOW (ref 3.6–5.5)
ALBUMIN/GLOB SERPL ELPH: 1.3 RATIO — SIGNIFICANT CHANGE UP
ALPHA1 GLOB SERPL ELPH-MCNC: 0.3 G/DL — SIGNIFICANT CHANGE UP (ref 0.1–0.4)
ALPHA2 GLOB SERPL ELPH-MCNC: 0.8 G/DL — SIGNIFICANT CHANGE UP (ref 0.5–1)
B-GLOBULIN SERPL ELPH-MCNC: 0.8 G/DL — SIGNIFICANT CHANGE UP (ref 0.5–1)
GAMMA GLOBULIN: 0.9 G/DL — SIGNIFICANT CHANGE UP (ref 0.6–1.6)
INTERPRETATION SERPL IFE-IMP: SIGNIFICANT CHANGE UP
PROT PATTERN SERPL ELPH-IMP: SIGNIFICANT CHANGE UP

## 2022-07-12 NOTE — BH INPATIENT PSYCHIATRY PROGRESS NOTE - CURRENT MEDICATION
[de-identified] : GENERAL APPEARANCE: Well nourished and hydrated, pleasant, alert, and oriented x 3. Appears their stated age. \par HEENT: Normocephalic, extraocular eye motion intact. Nasal septum midline. Oral cavity clear. External auditory canal clear. \par RESPIRATORY: Breath sounds clear and audible in all lobes. No wheezing, No accessory muscle use.\par CARDIOVASCULAR: No apparent abnormalities. No lower leg edema. No varicosities. Pedal pulses are palpable.\par NEUROLOGIC: Sensation is normal, no muscle weakness in the upper or lower extremities.\par DERMATOLOGIC: No apparent skin lesions, moist, warm, no rash.\par SPINE: Cervical spine appears normal and moves freely; thoracic spine appears normal and moves freely; lumbosacral spine appears normal and moves freely, normal, nontender.\par MUSCULOSKELETAL: Hands, wrists, and elbows are normal and move freely, shoulders are normal and move freely. \par Musculoskeletal:. Left knee exam shows medial joint line tenderness, slight effusion, FROM\par Right knee exam shows lateral joint line tenderness, no effusion, FROM\par windswept deformity. \par 5/5 motor strength in bilateral lower extremities. Sensory: Intact in bilateral lower extremities. DTRs: Biceps, brachioradialis, triceps, patellar, ankle and plantar 2+ and symmetric bilaterally. Pulses: dorsalis pedis, posterior tibial, femoral, popliteal, and radial 2+ and symmetric bilaterally. \par Constitutional: Alert and in no acute distress, but well-appearing. \par  MEDICATIONS  (STANDING):  diVALproex ER 1000 milliGRAM(s) Oral at bedtime  LORazepam     Tablet 1.5 milliGRAM(s) Oral three times a day  multivitamin 1 Tablet(s) Oral daily  QUEtiapine 50 milliGRAM(s) Oral at bedtime  trimethoprim  160 mG/sulfamethoxazole 800 mG 1 Tablet(s) Oral two times a day    MEDICATIONS  (PRN):  LORazepam     Tablet 2 milliGRAM(s) Oral every 6 hours PRN Agitation  LORazepam   Injectable 2 milliGRAM(s) IntraMuscular Once PRN severe agitation  melatonin. 5 milliGRAM(s) Oral at bedtime PRN Insomnia  nicotine  Polacrilex Gum 4 milliGRAM(s) Oral every 2 hours PRN smoking cessation  OLANZapine Injectable 2.5 milliGRAM(s) IntraMuscular once PRN Severe agitation  QUEtiapine 25 milliGRAM(s) Oral every 6 hours PRN agitation

## 2022-08-05 ENCOUNTER — EMERGENCY (EMERGENCY)
Facility: HOSPITAL | Age: 52
LOS: 1 days | Discharge: HOME | End: 2022-08-05
Attending: EMERGENCY MEDICINE | Admitting: EMERGENCY MEDICINE

## 2022-08-05 VITALS
DIASTOLIC BLOOD PRESSURE: 80 MMHG | SYSTOLIC BLOOD PRESSURE: 132 MMHG | OXYGEN SATURATION: 100 % | HEART RATE: 96 BPM | RESPIRATION RATE: 18 BRPM | TEMPERATURE: 98 F | HEIGHT: 62 IN

## 2022-08-05 DIAGNOSIS — E89.0 POSTPROCEDURAL HYPOTHYROIDISM: Chronic | ICD-10-CM

## 2022-08-05 DIAGNOSIS — F31.0 BIPOLAR DISORDER, CURRENT EPISODE HYPOMANIC: ICD-10-CM

## 2022-08-05 DIAGNOSIS — Z98.890 OTHER SPECIFIED POSTPROCEDURAL STATES: Chronic | ICD-10-CM

## 2022-08-05 PROBLEM — E04.9 NONTOXIC GOITER, UNSPECIFIED: Chronic | Status: ACTIVE | Noted: 2022-06-28

## 2022-08-05 PROBLEM — I10 ESSENTIAL (PRIMARY) HYPERTENSION: Chronic | Status: ACTIVE | Noted: 2022-06-28

## 2022-08-05 PROBLEM — C50.919 MALIGNANT NEOPLASM OF UNSPECIFIED SITE OF UNSPECIFIED FEMALE BREAST: Chronic | Status: ACTIVE | Noted: 2022-06-28

## 2022-08-05 LAB
ALBUMIN SERPL ELPH-MCNC: 4.8 G/DL — SIGNIFICANT CHANGE UP (ref 3.3–5)
ALP SERPL-CCNC: 79 U/L — SIGNIFICANT CHANGE UP (ref 40–120)
ALT FLD-CCNC: 19 U/L — SIGNIFICANT CHANGE UP (ref 4–33)
AMPHET UR-MCNC: NEGATIVE — SIGNIFICANT CHANGE UP
ANION GAP SERPL CALC-SCNC: 12 MMOL/L — SIGNIFICANT CHANGE UP (ref 7–14)
APAP SERPL-MCNC: <10 UG/ML — LOW (ref 15–25)
APPEARANCE UR: CLEAR — SIGNIFICANT CHANGE UP
AST SERPL-CCNC: 21 U/L — SIGNIFICANT CHANGE UP (ref 4–32)
BARBITURATES UR SCN-MCNC: NEGATIVE — SIGNIFICANT CHANGE UP
BASOPHILS # BLD AUTO: 0.03 K/UL — SIGNIFICANT CHANGE UP (ref 0–0.2)
BASOPHILS NFR BLD AUTO: 0.4 % — SIGNIFICANT CHANGE UP (ref 0–2)
BENZODIAZ UR-MCNC: NEGATIVE — SIGNIFICANT CHANGE UP
BILIRUB SERPL-MCNC: <0.2 MG/DL — SIGNIFICANT CHANGE UP (ref 0.2–1.2)
BILIRUB UR-MCNC: NEGATIVE — SIGNIFICANT CHANGE UP
BUN SERPL-MCNC: 11 MG/DL — SIGNIFICANT CHANGE UP (ref 7–23)
CALCIUM SERPL-MCNC: 9.8 MG/DL — SIGNIFICANT CHANGE UP (ref 8.4–10.5)
CHLORIDE SERPL-SCNC: 97 MMOL/L — LOW (ref 98–107)
CO2 SERPL-SCNC: 26 MMOL/L — SIGNIFICANT CHANGE UP (ref 22–31)
COCAINE METAB.OTHER UR-MCNC: NEGATIVE — SIGNIFICANT CHANGE UP
COLOR SPEC: COLORLESS — SIGNIFICANT CHANGE UP
COVID-19 SPIKE DOMAIN AB INTERP: POSITIVE
COVID-19 SPIKE DOMAIN ANTIBODY RESULT: >250 U/ML — HIGH
CREAT SERPL-MCNC: 0.75 MG/DL — SIGNIFICANT CHANGE UP (ref 0.5–1.3)
CREATININE URINE RESULT, DAU: 11 MG/DL — SIGNIFICANT CHANGE UP
DIFF PNL FLD: NEGATIVE — SIGNIFICANT CHANGE UP
EGFR: 98 ML/MIN/1.73M2 — SIGNIFICANT CHANGE UP
EOSINOPHIL # BLD AUTO: 0.12 K/UL — SIGNIFICANT CHANGE UP (ref 0–0.5)
EOSINOPHIL NFR BLD AUTO: 1.5 % — SIGNIFICANT CHANGE UP (ref 0–6)
ETHANOL SERPL-MCNC: <10 MG/DL — SIGNIFICANT CHANGE UP
FLUAV AG NPH QL: SIGNIFICANT CHANGE UP
FLUBV AG NPH QL: SIGNIFICANT CHANGE UP
GLUCOSE SERPL-MCNC: 111 MG/DL — HIGH (ref 70–99)
GLUCOSE UR QL: NEGATIVE — SIGNIFICANT CHANGE UP
HCG SERPL-ACNC: <5 MIU/ML — SIGNIFICANT CHANGE UP
HCT VFR BLD CALC: 35.5 % — SIGNIFICANT CHANGE UP (ref 34.5–45)
HGB BLD-MCNC: 11.5 G/DL — SIGNIFICANT CHANGE UP (ref 11.5–15.5)
IANC: 5.05 K/UL — SIGNIFICANT CHANGE UP (ref 1.8–7.4)
IMM GRANULOCYTES NFR BLD AUTO: 0.2 % — SIGNIFICANT CHANGE UP (ref 0–1.5)
KETONES UR-MCNC: NEGATIVE — SIGNIFICANT CHANGE UP
LEUKOCYTE ESTERASE UR-ACNC: NEGATIVE — SIGNIFICANT CHANGE UP
LYMPHOCYTES # BLD AUTO: 2.49 K/UL — SIGNIFICANT CHANGE UP (ref 1–3.3)
LYMPHOCYTES # BLD AUTO: 30.2 % — SIGNIFICANT CHANGE UP (ref 13–44)
MCHC RBC-ENTMCNC: 29.4 PG — SIGNIFICANT CHANGE UP (ref 27–34)
MCHC RBC-ENTMCNC: 32.4 GM/DL — SIGNIFICANT CHANGE UP (ref 32–36)
MCV RBC AUTO: 90.8 FL — SIGNIFICANT CHANGE UP (ref 80–100)
METHADONE UR-MCNC: NEGATIVE — SIGNIFICANT CHANGE UP
MONOCYTES # BLD AUTO: 0.53 K/UL — SIGNIFICANT CHANGE UP (ref 0–0.9)
MONOCYTES NFR BLD AUTO: 6.4 % — SIGNIFICANT CHANGE UP (ref 2–14)
NEUTROPHILS # BLD AUTO: 5.05 K/UL — SIGNIFICANT CHANGE UP (ref 1.8–7.4)
NEUTROPHILS NFR BLD AUTO: 61.3 % — SIGNIFICANT CHANGE UP (ref 43–77)
NITRITE UR-MCNC: NEGATIVE — SIGNIFICANT CHANGE UP
NRBC # BLD: 0 /100 WBCS — SIGNIFICANT CHANGE UP
NRBC # FLD: 0 K/UL — SIGNIFICANT CHANGE UP
OPIATES UR-MCNC: NEGATIVE — SIGNIFICANT CHANGE UP
OXYCODONE UR-MCNC: NEGATIVE — SIGNIFICANT CHANGE UP
PCP SPEC-MCNC: SIGNIFICANT CHANGE UP
PCP UR-MCNC: NEGATIVE — SIGNIFICANT CHANGE UP
PH UR: 7.5 — SIGNIFICANT CHANGE UP (ref 5–8)
PLATELET # BLD AUTO: 349 K/UL — SIGNIFICANT CHANGE UP (ref 150–400)
POTASSIUM SERPL-MCNC: 3.6 MMOL/L — SIGNIFICANT CHANGE UP (ref 3.5–5.3)
POTASSIUM SERPL-SCNC: 3.6 MMOL/L — SIGNIFICANT CHANGE UP (ref 3.5–5.3)
PROT SERPL-MCNC: 7.7 G/DL — SIGNIFICANT CHANGE UP (ref 6–8.3)
PROT UR-MCNC: NEGATIVE — SIGNIFICANT CHANGE UP
RBC # BLD: 3.91 M/UL — SIGNIFICANT CHANGE UP (ref 3.8–5.2)
RBC # FLD: 13.1 % — SIGNIFICANT CHANGE UP (ref 10.3–14.5)
RSV RNA NPH QL NAA+NON-PROBE: SIGNIFICANT CHANGE UP
SALICYLATES SERPL-MCNC: <0.3 MG/DL — LOW (ref 15–30)
SARS-COV-2 IGG+IGM SERPL QL IA: >250 U/ML — HIGH
SARS-COV-2 IGG+IGM SERPL QL IA: POSITIVE
SARS-COV-2 RNA SPEC QL NAA+PROBE: SIGNIFICANT CHANGE UP
SODIUM SERPL-SCNC: 135 MMOL/L — SIGNIFICANT CHANGE UP (ref 135–145)
SP GR SPEC: 1 — SIGNIFICANT CHANGE UP (ref 1–1.05)
THC UR QL: POSITIVE
TOXICOLOGY SCREEN, DRUGS OF ABUSE, SERUM RESULT: SIGNIFICANT CHANGE UP
TSH SERPL-MCNC: 2.93 UIU/ML — SIGNIFICANT CHANGE UP (ref 0.27–4.2)
UROBILINOGEN FLD QL: SIGNIFICANT CHANGE UP
VALPROATE SERPL-MCNC: <3.15 UG/ML — LOW (ref 50–100)
WBC # BLD: 8.24 K/UL — SIGNIFICANT CHANGE UP (ref 3.8–10.5)
WBC # FLD AUTO: 8.24 K/UL — SIGNIFICANT CHANGE UP (ref 3.8–10.5)

## 2022-08-05 PROCEDURE — 90792 PSYCH DIAG EVAL W/MED SRVCS: CPT | Mod: GC

## 2022-08-05 PROCEDURE — 99285 EMERGENCY DEPT VISIT HI MDM: CPT

## 2022-08-05 NOTE — ED PROVIDER NOTE - OBJECTIVE STATEMENT
52 y/o female h/o bipolar 52 y/o female h/o bipolar d/o on olanzapine and depakote bibems after found wandering in street.  Per ems, her mother became concerned after not hearing from her in a few days.  Found out her phone was disconnected for not paying the bill.  She tracked her to a hotel room where she found the pt's 13 year old son alone.  Pt found in street.  Per ems, pt is manic, energetic, talkative.  Pt states she feels fine and has no complaints.  Admits to mj use last night.  She is intermittently compliant with her psych meds.  States she sees a Dr. Rangle for psych, last visit 3 months ago.  Denies si/hi, a/v halluc.  Per ems, police involved already given presence of 13 year old son alone in hotel room.  He has been placed under care of his grandmother.  Per ems, pt had recent admission to [facility] for 30 days, discharged about 2 months ago.  Mother (Lewis):  [phone].  Boyfriend (Kang):  [phone].

## 2022-08-05 NOTE — ED BEHAVIORAL HEALTH ASSESSMENT NOTE - CURRENT MEDICATION
acetaminophen 325 mg oral tablet: 2 tab(s) orally every 6 hours, As needed, Severe Pain (7 - 10)  benztropine 1 mg oral tablet: 1 tab(s) orally once a day (at bedtime)  divalproex sodium 500 mg oral tablet, extended release: 2 tab(s) orally once a day (at bedtime)  Melatonin 5 mg oral tablet: 1 tab(s) orally once a day (at bedtime)  Multiple Vitamins oral tablet: 1 tab(s) orally once a day  risperiDONE 2 mg oral tablet, disintegratin tab(s) orally once a day  risperiDONE 4 mg oral tablet, disintegratin tab(s) orally once a day (at bedtime)

## 2022-08-05 NOTE — ED BEHAVIORAL HEALTH ASSESSMENT NOTE - HPI (INCLUDE ILLNESS QUALITY, SEVERITY, DURATION, TIMING, CONTEXT, MODIFYING FACTORS, ASSOCIATED SIGNS AND SYMPTOMS)
The patient is a 51 year-old female, unemployed, unstable domiciling status (sometimes domiciles with boyfriend, Kang, sometimes stays alone in vehicle or motel), PPHx of Bipolar D/O per chart, history of multiple inpatient hospitalizations (last hospitalized at [facility] from 6/6/22 to 6/28/28 for acute cj with psychosis), no history of SA/SI/NSSIB, history of cannabis use, PMSH significant for breast cA s/p surgery (patient recommended for but declining further outpatient aftercare), hx of nontoxic goiter s/p thyroid surgery, presenting to [facility] ED BIBEMS activated by mother after the patient was found to be displaying signs of cj/hypomania, wandering, and having taken 13 year-old nephew to a motel (now in the custody of the patient's mother).     Upon initial evaluation, the patient appeared with elevated, euphoric affect and was hyperverbal. The patient states that she does not know why her mother activated EMS at the motel, but the patient stated that she was taking care of her nephew. She elaborated that her car broke down and she did not want to leave it, which is why she was staying at a motel nearby. She states that she does have a private residence of a single family home in [place], where she stays alone. Regarding her previous [facility] inpatient hospitalization, she states that she was diagnosed with Bipolar D/O, but declined to elaborate on her symptoms. She states that she does not believe she is "mentally unstable," and is just going to outpatient Psychiatry with Dr. Yanez so she can collect her disability payments. She states that she had an appointment today and was actually going to go before her mother arrived at the motel. The patient states that she has been getting adequate sleep (6-11 hours per night) for the past month and has been taking her Depakote for most of the last month (did not take her medications for the past two days). She denies any current/historical SI/HI and A/V hallucinations. She states that her mood has been "good" for the past month, and that she is maintaining her ADLs well. The patient frequently endorses that she looks forward to leaving the ED soon. The patient was also noted to display some aspects of flight of ideas as she would switch between conversation topics (brought up "Rigoberto Simmons and Christine Gray" without prior context). She denies all substance use, except for "a few cigarettes everyday." She denies any PMH and endorses a latex allergy (rashes). The patient declined inpatient Psychiatric admission.    The team also spoke with the patient's mother and partner, Kang. They both stated that the patient is not fully well at the moment as she is not taking her medications. They endorse that the patient has not been adherent to her outpatient Psychiatric appointments and may be on the "way to becoming manic." However, they deny that the patient is suicidal or violent. They also acknowledge that the patient is likely attending to her ADLs as well. Kang states that he feels comfortable with the patient returning to live with him, but is concerned about her treatment adherence. Regarding her nephew, Kang endorses that he is in the full custody of the patient's mother as the patient's sister is currently in inpatient Psychiatric care for Bipolar D/O. He states that the earlier in the week, the patient's mother asked the patient to help take care of the nephew (as the patient's mother is 70 years of age) and the patient likely took this to mean that she was now the primary caregiver.

## 2022-08-05 NOTE — ED ADULT NURSE NOTE - OBJECTIVE STATEMENT
was at a hotel with 13 year old son, pts mother was trying to contact the pt, pt has not paid phone bill and was out of contact the pts mother went to the hotel and called the police 13 year old son was given to grand mother which is the pts mother  psychiatric evaluation, pt arrives using profanity and asking for coffee  Patient brought to Psych area. MD evaluated. Psych evaluated. Labs drawn and sent. Waiting for results, further orders and disposition. Pt conversating with staff but experiences moments where she is happy and talking and then a little angry (highs/lows).   CLIFFORD Gill

## 2022-08-05 NOTE — ED ADULT TRIAGE NOTE - CHIEF COMPLAINT QUOTE
was at a hotel with 13 year old son, pts mother was trying to contact the pt, pt has not paid phone bill and was out of contact the pts mother went to the hotel and called the police 13 year old son was given to grand mother which is the pts mother

## 2022-08-05 NOTE — ED BEHAVIORAL HEALTH ASSESSMENT NOTE - NSBHATTESTCOMMENTATTENDFT_PSY_A_CORE
The patient is a 51 year-old female, unemployed, unstable domiciling status (sometimes domiciles with boyfriend, Kang, sometimes stays alone in vehicle or motel), PPHx of Bipolar D/O per chart, history of multiple inpatient hospitalizations (last hospitalized at [facility] from 6/6/22 to 6/28/28 for acute cj with psychosis), no history of SA/SI/NSSIB, history of cannabis use, PMSH significant for breast cA s/p surgery (patient recommended for but declining further outpatient aftercare), hx of nontoxic goiter s/p thyroid surgery, presenting to [facility] ED BIBEMS activated by mother after the patient was found to be displaying signs of cj/hypomania, wandering, and having taken 13 year-old nephew to a motel (now in the custody of the patient's mother).     Upon initial evaluation, the patient appears with signs of cj/hypomania (flight of ideas, hyperverbal though not fully pressured, elevated mood/affect). It is unsure if the patient is currently at her baseline (as the patient is hypomanic at baseline per chart). She is not suicidal, psychotic, violent currently and attending to ADLs appropriately as per her and collateral information. The patient is at a low imminent risk of harm to herself and others, and does not meet criteria for involuntary Psychiatric admission. She declines voluntary Psychiatric admission and was provided with an appointment with [facility] on 8/8/22 at 4 PM.

## 2022-08-05 NOTE — ED BEHAVIORAL HEALTH ASSESSMENT NOTE - SUMMARY
The patient is a 51 year-old female, unemployed, unstable domiciling status (sometimes domiciles with boyfriend, Kang, sometimes stays alone in vehicle or motel), PPHx of Bipolar D/O per chart, history of multiple inpatient hospitalizations (last hospitalized at [facility] from 6/6/22 to 6/28/28 for acute cj with psychosis), no history of SA/SI/NSSIB, history of cannabis use, PMSH significant for breast cA s/p surgery (patient recommended for but declining further outpatient aftercare), hx of nontoxic goiter s/p thyroid surgery, presenting to [facility] ED BIBEMS activated by mother after the patient was found to be displaying signs of cj/hypomania, wandering, and having taken 13 year-old nephew to a motel (now in the custody of the patient's mother).     Upon initial evaluation, the patient appears with signs of cj/hypomania (flight of ideas, hyperverbal though not fully pressured, elevated mood/affect). It is unsure if the patient is currently at her baseline (as the patient is hypomanic at baseline per chart). She is not suicidal, psychotic, violent currently and attending to ADLs appropriately as per her and collateral information. The patient is at a low imminent risk of harm to herself and others, and does not meet criteria for involuntary Psychiatric admission. She declines voluntary Psychiatric admission and was provided with an appointment with AOMINDY on 8/8/22 at 4 PM.

## 2022-08-05 NOTE — ED BEHAVIORAL HEALTH ASSESSMENT NOTE - AXIS III
Anemia, history of Invasive ductal carcinoma of breast s/p R breast lumpectomy, toxic goiter s/p thyroidectomy

## 2022-08-05 NOTE — ED BEHAVIORAL HEALTH ASSESSMENT NOTE - DESCRIPTION
The patient has been in good behavioral control in the ED. No PRN medications or restraints used.    Vital Signs Last 24 Hrs  T(C): 36.7 (05 Aug 2022 09:16), Max: 36.7 (05 Aug 2022 09:16)  T(F): 98.1 (05 Aug 2022 09:16), Max: 98.1 (05 Aug 2022 09:16)  HR: 96 (05 Aug 2022 09:16) (96 - 96)  BP: 132/80 (05 Aug 2022 09:16) (132/80 - 132/80)  BP(mean): --  RR: 18 (05 Aug 2022 09:16) (18 - 18)  SpO2: 100% (05 Aug 2022 09:16) (100% - 100%) History of Invasive ductal carcinoma of breast s/p R breast lumpectomy, toxic goiter s/p thyroidectomy As per HPI

## 2022-08-05 NOTE — ED BEHAVIORAL HEALTH ASSESSMENT NOTE - DIFFERENTIAL
Bipolar I D/O with/without Psychotic Features  Schizoaffective D/O Bipolar Type  Elevated Mood 2/2 substance use  r/o mood/psychosis symptoms 2/2 neurological etiology (patient known to have brain lesions and was in the process of being worked up for infarct vs malignancy)

## 2022-08-05 NOTE — ED PROVIDER NOTE - PATIENT PORTAL LINK FT
You can access the FollowMyHealth Patient Portal offered by [facility] by registering at the following website: [email or web address]. By joining [employer]’s FollowMyHealth portal, you will also be able to view your health information using other applications (apps) compatible with our system.

## 2022-08-05 NOTE — ED BEHAVIORAL HEALTH ASSESSMENT NOTE - RISK ASSESSMENT
Unmodifiable Risk Factors: PPHx of Bipolar D/O, history of inpatient Psychiatric hospitalization, FH of Bipolar D/O, history of psychosis  Modifiable Risk Factors: current symptoms of cj/hypomania, recent history of treatment non-adherence, presence of brain masses, poor insight  Protective Factors: stable available housing, support system of significant other and mother, denial of SIIP/HIIP, not currently psychotic, attending to ADLs currently  Given the factors above, the patient is at a low imminent risk of harm to herself and others, and does not meet criteria for involuntary Psychiatric admission. She declines voluntary Psychiatric admission and was provided with an appointment with AOMINDY on 8/8/22 at 4 PM. Low Acute Suicide Risk

## 2022-08-05 NOTE — ED PROVIDER NOTE - CLINICAL SUMMARY MEDICAL DECISION MAKING FREE TEXT BOX
50 y/o female h/o bipolar d/o not compliant with depakote and olanzapine here with likely manic episode.  Obtain cbc cmp tsh tox scrn ekg medically clear for psych eval.

## 2022-08-05 NOTE — ED BEHAVIORAL HEALTH NOTE - BEHAVIORAL HEALTH NOTE
Gadielr was informed patient was medically and psychiatrically cleared for discharge.   met with patient who states she is staying at the [facility].  She requested a metrocard to get to the [facility].  provided patient metrocard serial # [id number].

## 2022-08-05 NOTE — ED BEHAVIORAL HEALTH ASSESSMENT NOTE - OTHER
Sometimes domiciled with boyfriendKang, sometimes unstable home (stays in vehicle, motels) Sometimes domiciled with boyfriend, Kang, sometimes unstable home (stays in vehicle, motels) alone Mother activated LUC[employer]

## 2022-08-05 NOTE — ED BEHAVIORAL HEALTH ASSESSMENT NOTE - DETAILS
Discussed with ED Team Patient is not suicidal/violent Sister with Bipolar D/O, inpatient Psychiatric hospitalizations As above

## 2022-08-24 ENCOUNTER — RX RENEWAL (OUTPATIENT)
Age: 52
End: 2022-08-24

## 2022-08-24 RX ORDER — ATORVASTATIN CALCIUM 80 MG/1
80 TABLET, FILM COATED ORAL
Qty: 90 | Refills: 3 | Status: ACTIVE | COMMUNITY
Start: 2022-08-24 | End: 1900-01-01

## 2022-10-03 ENCOUNTER — OUTPATIENT (OUTPATIENT)
Dept: OUTPATIENT SERVICES | Facility: HOSPITAL | Age: 52
LOS: 1 days | Discharge: STILL ADMITTED OR ADMINISTRATIVE | End: 2022-10-03

## 2022-10-03 DIAGNOSIS — F31.73 BIPOLAR DISORDER, IN PARTIAL REMISSION, MOST RECENT EPISODE MANIC: ICD-10-CM

## 2022-10-03 DIAGNOSIS — E89.0 POSTPROCEDURAL HYPOTHYROIDISM: Chronic | ICD-10-CM

## 2022-10-03 DIAGNOSIS — Z98.890 OTHER SPECIFIED POSTPROCEDURAL STATES: Chronic | ICD-10-CM

## 2024-01-01 NOTE — ED BEHAVIORAL HEALTH ASSESSMENT NOTE - NS ED [facility] MED ROS PSYCHIATRIC
Discharge instructions provided to  parents at bedside in written and oral form. All restrictions, precautions, signs of infection, and follow up reviewed. All questions answered. Parents had a good understanding of information provided. Follow up with Dr. Preciado in 4-6 weeks.                  See HPI

## 2024-07-09 NOTE — BH INPATIENT PSYCHIATRY PROGRESS NOTE - NSTXMANICPROGRES_PSY_ALL_CORE
.A refill request was received for:  Requested Prescriptions     Pending Prescriptions Disp Refills    ZEPBOUND 7.5 MG/0.5ML Subcutaneous Solution Auto-injector [Pharmacy Med Name: ZEPBOUND 7.5MG/0.5ML INJ] 2 mL 0     Sig: INJECT 7.5 MG INTO THE SKIN ONCE A WEEK.       Last refill date:   6/19/2024    Last office visit: 12/31/2023    Follow up due:  No future appointments.      
No Change
No Change
Met - goal discontinued
Met - goal discontinued
No Change
Met - goal discontinued
No Change
Met - goal discontinued
No Change
No Change
Met - goal discontinued
No Change
Met - goal discontinued

## 2024-11-06 NOTE — ED BEHAVIORAL HEALTH ASSESSMENT NOTE - OTHER PAST PSYCHIATRIC HISTORY (INCLUDE DETAILS REGARDING ONSET, COURSE OF ILLNESS, INPATIENT/OUTPATIENT TREATMENT)
Patient's last appointment was : 10/21/2024  Patient's next appointment is : Visit date not found  Last sent in: 10/18/24 180 tabs with 0 refills    As per HPI: multiple inpatient hospitalizations: last on [facility] in June 2022 for cj, history of treatment non-adherence

## 2024-12-12 NOTE — BH PATIENT PROFILE - PRIMARY SOURCE OF SUPPORT/COMFORT
Quality 47: Advance Care Plan: Advance care planning not documented, reason not otherwise specified. Quality 226: Preventive Care And Screening: Tobacco Use: Screening And Cessation Intervention: Patient screened for tobacco use and is an ex/non-smoker Detail Level: Detailed Quality 431: Preventive Care And Screening: Unhealthy Alcohol Use - Screening: Patient not identified as an unhealthy alcohol user when screened for unhealthy alcohol use using a systematic screening method Quality 130: Documentation Of Current Medications In The Medical Record: Current Medications Documented no one